# Patient Record
Sex: MALE | HISPANIC OR LATINO | Employment: OTHER | URBAN - METROPOLITAN AREA
[De-identification: names, ages, dates, MRNs, and addresses within clinical notes are randomized per-mention and may not be internally consistent; named-entity substitution may affect disease eponyms.]

---

## 2017-02-07 ENCOUNTER — GENERIC CONVERSION - ENCOUNTER (OUTPATIENT)
Dept: OTHER | Facility: OTHER | Age: 72
End: 2017-02-07

## 2018-01-09 NOTE — MISCELLANEOUS
Dear Robbie Isidro,     As discussed with Bianca Griggs, due to scheduling changes I have rescheduled some of your appointments as below:       Monday, April 11 ([address] )     · CT scan head at 2:30 pm (arrive to outpatient registration at 2:15 pm)      Friday, April 15 ([address] [facility])    · PT (physical therapy) gait assessment at 10:00 am (arrive at 9:45 am)  appointment  in our office with Dr Ivett Alcocer to follow at 11:00 am  [address] [place] [address]: [address] [facility], [facility] (near [address])  Enter the building through the main entrance and go straight down the [address] to PT  our  office is [address]      If you have any questions or concerns, please contact me directly at [phone]  Thank you           JEREMIE Montesinos  Coordinator, MIGUEL Castillo MD  Director,  ALICIA MORALES [facility]      Electronically signed by:Rose Marie Rodriguez   Apr 4 2016 11:32AM EST Author

## 2018-01-09 NOTE — RESULT NOTES
Verified Results  (1) COMPREHENSIVE METABOLIC PANEL [id number] 68:50OC Maikol Izaguirre Order Number: [id number]  TW Order Number: [id number] Order Number: [id number] Order Number: [id number]     Test Name Result Flag Reference   GLUCOSE,RANDM 183 mg/dL H    If the patient is fasting, the ADA then defines impaired fasting glucose as > 100 mg/dL and diabetes as > or equal to 123 mg/dL  SODIUM 137 mmol/L  136-145   POTASSIUM 4 9 mmol/L  3 5-5 3   CHLORIDE 104 mmol/L  100-108   CARBON DIOXIDE 28 mmol/L  21-32   ANION GAP (CALC) 5 mmol/L  4-13   BLOOD UREA NITROGEN 19 mg/dL  5-25   CREATININE 0 97 mg/dL  0 60-1 30   Standardized to IDMS reference method   CALCIUM 9 6 mg/dL  8 3-10 1   BILI, TOTAL 0 47 mg/dL  0 20-1 00   ALK PHOSPHATAS 137 U/L H    ALT (SGPT) 30 U/L  12-78   AST(SGOT) 25 U/L  5-45   ALBUMIN 3 9 g/dL  3 5-5 0   TOTAL PROTEIN 9 1 g/dL H 6 4-8 2   eGFR Non-African American      >60 0 ml/min/1 [address] Disease Education Program recommendations are as follows:  GFR calculation is accurate only with a steady state creatinine  Chronic Kidney disease less than 60 ml/min/1 73 sq  meters  Kidney failure less than 15 ml/min/1 73 sq  meters       (1) CBC/PLT/DIFF 20Jun2016 08:44AM Maikol Izaguirre Order Number: [id number]  TW Order Number: [id number]     Test Name Result Flag Reference   WBC COUNT 6 94 Thousand/uL  4 31-10 16   RBC COUNT 5 70 Million/uL H 3 88-5 62   HEMOGLOBIN 14 6 g/dL  12 0-17 0   HEMATOCRIT 44 4 %  36 5-49 3   MCV 78 fL L 82-98   MCH 25 6 pg L 26 8-34 3   MCHC 32 9 g/dL  31 4-37 4   RDW 17 5 % H 11 6-15 1   MPV 9 8 fL  8 9-12 7   PLATELET COUNT 059 Thousands/uL  149-390   nRBC AUTOMATED 0 /100 WBCs     NEUTROPHILS RELATIVE PERCENT 64 %  43-75   LYMPHOCYTES RELATIVE PERCENT 18 %  14-44   MONOCYTES RELATIVE PERCENT 12 %  4-12   EOSINOPHILS RELATIVE PERCENT 5 %  0-6   BASOPHILS RELATIVE PERCENT 1 % 0-1   NEUTROPHILS ABSOLUTE COUNT 4 45 Thousands/?L  1 85-7 62   LYMPHOCYTES ABSOLUTE COUNT 1 25 Thousands/?L  0 60-4 47   MONOCYTES ABSOLUTE COUNT 0 85 Thousand/?L  0 17-1 22   EOSINOPHILS ABSOLUTE COUNT 0 32 Thousand/?L  0 00-0 61   BASOPHILS ABSOLUTE COUNT 0 04 Thousands/?L  0 00-0 10     (1) TSH WITH FT4 REFLEX 20Jun2016 08:44AM Germain Mcintyre    Order Number: [id number]  TW Order Number: [id number] Order Number: [id number] Order Number: [id number]     Test Name Result Flag Reference   TSH 2 450 uIU/mL  0 358-3 740     (1) HEMOGLOBIN A1C 20Jun2016 08:44AM Jennifer Picking Order Number: [id number]  TW Order Number: [id number]     Test Name Result Flag Reference   HEMOGLOBIN A1C 9 1 % H 4 2-6 3   EST  AVG  GLUCOSE 214 mg/dl       (1) LIPID PANEL FASTING W DIRECT LDL REFLEX 20Jun2016 08:44AM Jennifer Picking Order Number: [id number]  TW Order Number: [id number] Order Number: [id number] Order Number: [id number]     Test Name Result Flag Reference   CHOLESTEROL 209 mg/dL H    LDL CHOLESTEROL CALCULATED 136 mg/dL H 0-100   Triglyceride:         Normal              <150 mg/dl       Borderline High    150-199 mg/dl       High               200-499 mg/dl       Very High          >499 mg/dl  Cholesterol:         Desirable        <200 mg/dl      Borderline High  200-239 mg/dl      High             >239 mg/dl  HDL Cholesterol:        High    >59 mg/dL      Low     <41 mg/dL  LDL Cholesterol:        Optimal          <100 mg/dl        Near Optimal     100-129 mg/dl        Above Optimal          Borderline High   130-159 mg/dl          High              160-189 mg/dl          Very High        >189 mg/dl  LDL CALCULATED:    This screening LDL is a calculated result  It does not have the accuracy of the Direct Measured LDL in the monitoring of patients with hyperlipidemia and/or statin therapy     Direct Measure LDL (AVL236) must be ordered separately in these patients  TRIGLYCERIDES 197 mg/dL H <=150   Specimen collection should occur prior to N-Acetylcysteine or Metamizole administration due to the potential for falsely depressed results  HDL,DIRECT 34 mg/dL L 40-60   Specimen collection should occur prior to Metamizole administration due to the potential for falsely depressed results  (1) MICROALBUMIN CREATININE RATIO, RANDOM URINE 20Jun2016 08:44AM [employer] Order Number: [id number]   Order Number: [id number]     Test Name Result Flag Reference   MICROALBUMIN/ CREAT R 236 mg/g creatinine H 0-30   MICROALBUMIN,URINE 243 0 mg/L H 0 0-20 0   CREATININE URINE 103 0 mg/dL       * XR SPINE CERVICAL 2 OR 3 VIEW 20Jun2016 07:25AM [employer] Order Number: [id number]     Test Name Result Flag Reference   XR SPINE CERVICAL 2 OR 3 VW (Report)     CERVICAL SPINE     INDICATION: Posterior neck pain and stiffness  COMPARISON: None     VIEWS: AP, lateral and open mouth projections; 4 images     FINDINGS: C6 and C7 are incompletely seen  No evidence of fracture or subluxation  Large anterior osteophytes are noted adjacent to C4-C5 and C6  There is apparent fusion anteriorly of these osteophytes at the C5-C6 level   shunt noted in place within the subcutaneous tissues of the right neck  Median sternotomy wires noted  The lung apices are intact  IMPRESSION:     Degenerative changes within the cervical spine extending from C4 through C6       Workstation performed: [id number]     Signed by:   Vero Ye MD   6/21/16       Plan  Diabetes mellitus type 2, uncontrolled    · (1) MICROALBUMIN CREATININE RATIO, RANDOM URINE ; every 1 year; Last  20Jun2016; Next 72UBE5165; Status:Active   · Hemoglobin A1c- POC ; every 3 months; Next 24JDH6115; Status:Active    Discussion/Summary   XRAY OF NECK SHOWS ARTHRITIS SO FAR  DIABETES NOT WELL CONTROLLED  CHOLESTEROL MUCH HIGHER  ARE YOU TAKING YOUR CHOLESTEROL MEDICATION?? YOU MAY NEED TO COME IN SOONER TO MAKE MEDICATION ADJUSTMENTS         Signatures   Electronically signed by : MICHEL Murillo; Jun 29 2016  7:00AM EST                       (Author)

## 2018-01-10 NOTE — RESULT NOTES
Message   send to the ordering physician        Verified Results  (1) APTT [id number] 07:37AM Shanda Bur   ********THIS TEST WAS ORDERED BY A HOSPITALIST UPON DISCHARGE ********     Test Name Result Flag Reference   PARTIAL THROMBOPLASTIN TIME 33 seconds  24-35   Therapeutic Heparin Range =  60-90 seconds     (1) PLATELET COUNT 44EDW4260 12:00AM Shanda Rio   ********THIS TEST WAS ORDERED BY A HOSPITALIST UPON DISCHARGE ********     Test Name Result Flag Reference   PLATELET COUNT 029 Thousands/uL  149-390   MPV 10 3 fL  8 9-12 7       Signatures   Electronically signed by : Flash Stoll DO; Feb 18 2016  8:00PM EST                       (Author)

## 2018-01-10 NOTE — PSYCH
Reason For Visit  Reason For Visit Free Text Note Form: Met briefly with patient and his daughter  Having issues at home as he is living with his ex-wife and her MH issues affecting his status and creating strain for family  Looking for direction for ex-wife as well as possible housing options for him  They could not stay for an appt but will meet with me on 3/28/16 to discuss options  Hav printed out [employer] and [employer] applications for the appt  Active Problems    1  Abdominal adhesions (568 0) (K66 0)   2  Abnormal laboratory test result (796 4) (R89 9)   3  Acquired insufficiency of aortic valve (424 1) (I35 1)   4  ROGELIO positive (795 79) (R76 8)   5  Anemia (285 9) (D64 9)   6  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,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)   7  Aortic stenosis (747 22) (Q25 3)   8  AVM (arteriovenous malformation) (747 60) (Q27 30)   9  Benign essential hypertension (401 1) (I10)   10  Bone pain (733 90) (M89 8X9)   11  Cellulitis (682 9) (L03 90)   12  Cognitive changes (799 59) (R41 89)   13  Coronary artery disease (414 00) (I25 10)   14  Depression (311) (F32 9)   15  Diabetes mellitus type 2, uncontrolled (250 02) (E11 65)   16  Diabetic nephropathy (250 40,583 81) (E11 21)   17  Dizziness (780 4) (R42)   18  Dry skin (701 1) (L85 3)   19  Dysphagia (787 20) (R13 10)   20  Dyspnea on exertion (786 09) (R06 09)   21  Fall at home (X187 9,P389 4) (R36  XXXA,Y92 099)   22  Fatigue (780 79) (R53 83)   23  Gait disturbance (781 2) (R26 9)   24  GERD without esophagitis (530 81) (K21 9)   25  Hand arthritis (716 94) (M12 9)   26  Hyperlipidemia (272 4) (E78 5)   27  Imbalance (781 2) (R26 89)   28  Joint pain, knee (719 46) (M25 569)   29  Left knee pain (719 46) (M25 562)   30  Memory loss (780 93) (R41 3)   31  Muscular deconditioning (781 99) (R29 898)   32  Need for immunization against influenza (V04 81) (Z23)   33  Need for pneumococcal vaccination (V03 82) (Z23)   34   Need for Tdap vaccination (V06 1) (Z23)   35  NPH (normal pressure hydrocephalus) (331 5) (G91 2)   36  PAF (paroxysmal atrial fibrillation) (427 31) (I48 0)   37  Pain in joint of left shoulder (719 41) (M25 512)   38  Pain, joint, shoulder (719 41) (M25 519)   39  Peptic ulcer (533 90) (K27 9)   40  Popliteal pain (729 5) (M79 609)   41  Preop examination (V72 84) (Z01 818)   42  Prosthetic valve dysfunction (429 4) (T82 09XA)   43  S/P AVR (V43 3) (Z95 2)   44  Screening for depression (V79 0) (Z13 89)   45  Screening for genitourinary condition (V81 6) (Z13 89)   46  Screening for neurological condition (V80 09) (Z13 89)   47  Solitary pulmonary nodule (793 11) (R91 1)   48  Status post aortic valve replacement (V43 3) (Z95 2)   49  Symptoms involving cardiovascular system (785 9) (R09 89)    Current Meds   1  Aspirin  MG Oral Tablet Delayed Release; Take 1 tablet daily; Therapy: 35FRZ9218 to (Evaluate:12Feb2016)  Requested for: 90IET1451; Last   Rx:15Oct2015 Ordered   2  Atorvastatin Calcium 20 MG Oral Tablet; TAKE 1 TABLET DAILY AT BEDTIME; Therapy: 16QPY5300 to (Evaluate:12Apr2016)  Requested for: 26ARI2829; Last   Rx:15Oct2015 Ordered   3  BD Pen Needle Penelope U/F 32G X 4 MM Miscellaneous; use 4 needles daily for insulin   injections MDD:4 TDD:4;   Therapy: 65FCD2681 to (Evaluate:08Jun2016)  Requested for: 17EMQ6442; Last   Rx:54Rjj0461 Ordered   4  Lantus SoloStar 100 UNIT/ML Subcutaneous Solution Pen-injector; INJECT 14 UNIT   Bedtime; Therapy: 32DKO3625 to (Evaluate:08Jun2016)  Requested for: 96PEH1837; Last   Rx:85Cyr8211 Ordered   5  NovoLOG FlexPen 100 UNIT/ML Subcutaneous Solution Pen-injector; INJECT   SUBCUTANEOUSLY:   12 U AT BREAKFAST - 8 U AT LUNCH- 8U AT DINNER; Therapy: 46FCP4627 to (Evaluate:08Jun2016)  Requested for: 62YES8098; Last   Rx:15Kdf9895 Ordered   6  Omeprazole 20 MG Oral Capsule Delayed Release; take 1 capsule by mouth every   morning BEFORE BREAKFAST;    Therapy: 81OTM8350 to (Evaluate:27Jun2016)  Requested for: 41GUX1033; Last   Rx:88Szz1509 Ordered   7  OneTouch Combo Pack Miscellaneous; TEST 4 TIME DAILY; Therapy: 07IFO1220 to (Last Rx:35Tcv8611)  Requested for: 15VZT2849 Ordered   8  OneTouch Delica Lancets 74D Miscellaneous; TEST FOUR TIMES DAILY AS DIRECTED; Therapy: 93MCP5092 to (Remington Shaun)  Requested for: 33RXS4251; Last   Rx:14Mar2016 Ordered   9  OneTouch Ultra Blue In Vitro Strip; USE TO TEST BLOOD SUGAR 2 TIMES A DAY; Therapy: 25DHL2416 to (Remington Shaun)  Requested for: 68KRB3284; Last   Rx:14Mar2016 Ordered    Allergies    1  No Known Drug Allergies    2  No Known Environmental Allergies   3  No Known Food Allergies    Assessment    1  Cellulitis (682 9) (L03 90)   2  Depression (311) (F32 9)   3  Diabetes mellitus type 2, uncontrolled (250 02) (E11 65)    Plan    1  Cephalexin 250 MG Oral Capsule (Keflex); TAKE 1 CAPSULE 4 TIMES DAILY    2  FLUoxetine HCl - 10 MG Oral Capsule (PROzac); TAKE 1 CAPSULE DAILY    3  (1) COMPREHENSIVE METABOLIC PANEL; Status:Active; Requested for:24Mar2016;    4  (1) HEMOGLOBIN A1C; Status:Active; Requested for:24Mar2016;    5  (1) LIPID PANEL, FASTING; Status:Active; Requested for:24Mar2016;    6  (1) MICROALBUMIN CREATININE RATIO, RANDOM URINE; Status:Active; Requested   for:24Mar2016;    7  Blood Glucose- POC; Status:Unauthorized - Requires Signature;  Requested   for:24Mar2016;   Fasting (Y/N) : Yes - Y    Signatures   Electronically signed by : Amber Steen LCSW; Mar 24 2016  2:43PM EST                       (Author)

## 2018-01-10 NOTE — MISCELLANEOUS
Dear Lulu Guardado,     As discussed with Braden Velasquez, you are scheduled for the following appointments:       Friday, December 2nd ([address] )    · CT scan head at 10:00am (arrive to outpatient registration at 9:30am)      Wednesday, December 7th ([address] [place] [address])     · PT (physical therapy) gait assessment at 11:00 (arrive at 10:45 am)  appointment in our office with Dr Olivia Yadav to follow       Both PT and our office are located on the 2nd floor of the Medical Office Building at [address] [place] [address]: [facility]  Located at the intersection of [facility] and [facility]  If you use a cane or walker, be sure to bring  these assistive devices with you  If you have any questions or need to reschedule, please contact me directly at [phone]  Thank you           Jesus Carlson RN  Coordinator, NPH Pancho Berry MD  Director,  ALICIA MORALES JR  [facility]        Electronically signed Rohit Almonte RN  Nov 14 2016  2:40PM EST Author

## 2018-01-11 NOTE — MISCELLANEOUS
Assessment    1  Cellulitis (682 9) (L03 90)   2  Depression (311) (F32 9)   3  Diabetes mellitus type 2, uncontrolled (250 02) (E11 65)    Plan  Cellulitis    · Cephalexin 250 MG Oral Capsule (Keflex); TAKE 1 CAPSULE 4 TIMES DAILY   Rx By: Tanmay Tidwell; Dispense: 7 Days ; #:28 Capsule; Refill: 0; For: Cellulitis; BASSEM = N; Sent To: [facility] [address]  Depression    · FLUoxetine HCl - 10 MG Oral Capsule (PROzac); TAKE 1 CAPSULE DAILY   Rx By: Tanmay Tidwell; Dispense: 30 Days ; #:30 Capsule; Refill: 3; For: Depression; BASSEM = N; Sent To: [facility]  Diabetes mellitus type 2, uncontrolled    · (1) COMPREHENSIVE METABOLIC PANEL; Status:Active; Requested for:24Mar2016;    Perform:[facility]; AYA:[id number];MDFXWGO; For:Diabetes mellitus type 2, uncontrolled; Ordered By:Armando Lozada;   · (1) HEMOGLOBIN A1C; Status:Active; Requested for:24Mar2016;    Perform:[facility]; AOO:[id number];INZSQTY; For:Diabetes mellitus type 2, uncontrolled; Ordered By:Armando Lozada;   · (1) LIPID PANEL, FASTING; Status:Active; Requested for:24Mar2016;    Perform:[facility]; NAN:[id number];EHVDCKI; For:Diabetes mellitus type 2, uncontrolled; Ordered By:Armando Lozada;   · (1) MICROALBUMIN CREATININE RATIO, RANDOM URINE; Status:Active; Requested  for:24Mar2016;    Perform:[facility]; HKU:36NME6208;NVJQACL; For:Diabetes mellitus type 2, uncontrolled; Ordered By:Pardeep Lozada;           assessment and plan #1 transition care management visit the patient was recently hospitalized at [facility] for a shunt placement regarding normal pressure hydrocephalus via neurosurgery Dr Ontiveros, the patient underwent the procedure without any complications and clinically is doing very well, several days ago he developed redness around the staple site in the abdominal area is minimally tender, the staples are intact    He does have follow-up with neurosurgery for staple removal in 4 days, the redness is consistent with a superficial cellulitis I will treat the patient with Keflex 250 mg 1 tablet every 6 hours Ã7 days and to monitor the area appears increasing redness, streaks fevers or chills noted by me immediately  #2 mild depression situational no suicidal ideation will start him on Prozac 10 mg once a day I will have him see counselor Kenya Stinson  #3 diabetes type 2 I'll be checking a comprehensive metabolic panel, lipid profile, hemoglobin A1c the patient did have his blood sugar checked in the office today  RTO 1-2 months call if any problems     Chief Complaint  Chief Complaint Free Text Note Form: RAMOS   Chief Complaint Chronic Condition [facility]: Patient is here today for follow up of chronic conditions described in HPI  History of Present Illness  TCM Communication [facility]: The patient is being contacted for follow-up after hospitalization  Hospital records were reviewed  He was hospitalized at [facility]  The dates of hospitalization:, date of admission: 3/14/16, date of discharge: 116/16, 50-year-old male who is coming in for his transition care management visit he is accompanied with his daughter he was hospitalized at [address] [place] [address] in [facility] for communicating hydrocephalus during his hospitalization he did undergo a right frontal ventricular peritoneal shunt, the patient did have a speedy recovery he did not have any complications during the hospitalization and he was discharged subsequently on 3-16  he is now with his wife, she needed to be admitted to the psych unit becausee of psychosis and she checked out of the hospital , daughter is upset because of the home situation , she refuses to take her meds, no safety issues he feels is emotional no si no hi he feels down for a couple months seeing his wife the way she is , the pt and daughter are requesting treatment for depression   the patient has noticed some redness around the staples of the abdomen last several days no fever no chills  Diagnosis: COMMUNICATING HYDROCEPHALUS  He was discharged to home  Medications were not reviewed today  He scheduled a follow up appointment  Follow-up appointments with other specialists: NeuroSurg Dr Percy Gonzalez 3/29/2016  Symptoms: dizziness, fatigue and incisional pain, but no fever, no weakness, no headache, no cough, no shortness of breath, no chest pain, no upper abdominal pain, no middle abdominal pain, no lower abdominal pain, no rash:, no anorexia, no nausea, no vomiting, no loose stools, no constipation, no pain with urinating and no wound drainage  The patient is currently asymptomatic  Counseling was provided to patient's family  SPOKE WITH DAUGHTER  Communication performed and completed by [facility] [place], operated by [facility]      Active Problems    1  Abdominal adhesions (568 0) (K66 0)   2  Abnormal laboratory test result (796 4) (R89 9)   3  Acquired insufficiency of aortic valve (424 1) (I35 1)   4  ROGELIO positive (795 79) (R76 8)   5  Anemia (285 9) (D64 9)   6  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,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)   7  Aortic stenosis (747 22) (Q25 3)   8  AVM (arteriovenous malformation) (747 60) (Q27 30)   9  Benign essential hypertension (401 1) (I10)   10  Bone pain (733 90) (M89 8X9)   11  Cognitive changes (799 59) (R41 89)   12  Coronary artery disease (414 00) (I25 10)   13  Diabetes mellitus type 2, uncontrolled (250 02) (E11 65)   14  Diabetic nephropathy (250 40,583 81) (E11 21)   15  Dizziness (780 4) (R42)   16  Dry skin (701 1) (L85 3)   17  Dysphagia (787 20) (R13 10)   18  Dyspnea on exertion (786 09) (R06 09)   19  Fall at home (K406 8,P610 0) (J06  GSNU,Y28 999)   20  Fatigue (780 79) (R53 83)   21  Gait disturbance (781 2) (R26 9)   22  GERD without esophagitis (530 81) (K21 9)   23  Hand arthritis (716 94) (M12 9)   24  Hyperlipidemia (272 4) (E78 5)   25   Imbalance (781 2) (R26 89)   26  Joint pain, knee (761 02) (M25 569)   27  Left knee pain (719 46) (M25 562)   28  Memory loss (780 93) (R41 3)   29  Muscular deconditioning (781 99) (R29 898)   30  Need for immunization against influenza (V04 81) (Z23)   31  Need for pneumococcal vaccination (V03 82) (Z23)   32  Need for Tdap vaccination (V06 1) (Z23)   33  NPH (normal pressure hydrocephalus) (331 5) (G91 2)   34  PAF (paroxysmal atrial fibrillation) (427 31) (I48 0)   35  Pain in joint of left shoulder (719 41) (M25 512)   36  Pain, joint, shoulder (719 41) (M25 519)   37  Peptic ulcer (533 90) (K27 9)   38  Popliteal pain (729 5) (M79 609)   39  Preop examination (V72 84) (Z01 818)   40  Prosthetic valve dysfunction (429 4) (T82 09XA)   41  S/P AVR (V43 3) (Z95 2)   42  Screening for depression (V79 0) (Z13 89)   43  Screening for genitourinary condition (V81 6) (Z13 89)   44  Screening for neurological condition (V80 09) (Z13 89)   45  Solitary pulmonary nodule (793 11) (R91 1)   46  Status post aortic valve replacement (V43 3) (Z95 2)   47  Symptoms involving cardiovascular system (785 9) (R09 89)    Past Medical History    1  History of Blood in stool (578 1) (K92 1)   2  History of Bone pain (733 90) (M89 8X9)   3  History of anemia (V12 3) (Z86 2)   4  History of arthritis (V13 4) (Z87 39)   5  History of blood transfusion (V15 89) (Z92 89)   6  History of cardiac murmur (V12 59) (Z86 79)   7  History of constipation (V12 79) (Z87 19)   8  History of nausea (V12 79) (Z87 898)   9  History of type 2 diabetes mellitus (V12 29) (Z86 39)   10  History of type 2 diabetes mellitus (V12 29) (Z86 39)   11  History of urinary frequency (V13 09) (Z87 898)   12  History of Screening for neurological condition (V80 09) (Z13 89)   13  History of Urinary urgency (678 63) (R39 15)    Surgical History    1  Aortic Valve Replacement   2  History of Small Bowel Resection  Surgical History Reviewed: The surgical history was reviewed and updated today  Family History    1  Family history of Alzheimer's disease (V17 2) (Z82 0)   2  Family history of cerebrovascular accident (V17 1) (Z82 3)   3  Family history of diabetes mellitus (V18 0) (Z83 3)   4  Family history of glaucoma (V19 11) (Z83 511)   5  Family history of hyperlipidemia (V18 19) (Z83 49)   6  Family history of kidney disease (V18 69) (Z84 1)   7  Family history of Polio    8  Family history of diabetes mellitus (V18 0) (Z83 3)   9  Family history of glaucoma (V19 11) (Z83 511)   10  Family history of hyperlipidemia (V18 19) (Z83 49)   11  Family history of kidney disease (V18 69) (Z84 1)   12  Family history of myocardial infarction (V17 3) (Z82 49)    13  Family history of Anxiety   14  Family history of depression (V17 0) (Z81 8)   15  Family history of rheumatoid arthritis (V17 7) (Z82 61)    16  Family history of Brain tumor   17  Family history of diabetes mellitus (V18 0) (Z83 3)   18  Family history of glaucoma (V19 11) (Z83 511)   19  Family history of hyperlipidemia (V18 19) (Z83 49)   20  Family history of kidney disease (V18 69) (Z84 1)   21  Family history of malignant neoplasm (V16 9) (Z80 9)   22  Family history of pancreatitis (V18 59) (Z83 79)  Family History Reviewed: The family history was reviewed and updated today  Social History    · Denied: Alcohol use (V49 89) (Z78 9)   · Caffeine use (V49 89) (F15 90)   · Completed 12th grade   ·    · Denied: Drug use (305 90) (F19 90)   · Former smoker (V15 82) (X74 071)   · Functioning activity level   · Living situation   · Retired   · Two children  Social History Reviewed: The social history was reviewed and updated today  The social history was reviewed and is unchanged  Current Meds   1  Aspirin  MG Oral Tablet Delayed Release; Take 1 tablet daily; Therapy: 18HEA3233 to (Evaluate:22Lnk3462)  Requested for: 61IRA9898; Last   Rx:15Oct2015 Ordered   2  Atorvastatin Calcium 20 MG Oral Tablet; TAKE 1 TABLET DAILY AT BEDTIME;    Therapy: 68MMG9871 to (Evaluate:12Apr2016)  Requested for: 60SFU4657; Last   Rx:40Ejs4832 Ordered   3  BD Pen Needle Penelope U/F 32G X 4 MM Miscellaneous; use 4 needles daily for insulin   injections MDD:4 TDD:4;   Therapy: 25PAK2236 to (Evaluate:08Jun2016)  Requested for: 54GNJ0655; Last   Rx:92Jgu6337 Ordered   4  Lantus SoloStar 100 UNIT/ML Subcutaneous Solution Pen-injector; INJECT 14 UNIT   Bedtime; Therapy: 63YZI4415 to (Evaluate:08Jun2016)  Requested for: 71DSL4008; Last   Rx:16Vcf9741 Ordered   5  NovoLOG FlexPen 100 UNIT/ML Subcutaneous Solution Pen-injector; INJECT   SUBCUTANEOUSLY:   12 U AT BREAKFAST - 8 U AT LUNCH- 8U AT DINNER; Therapy: 28ALG3352 to (Evaluate:08Jun2016)  Requested for: 51OFQ6607; Last   Rx:78Hfv8497 Ordered   6  Omeprazole 20 MG Oral Capsule Delayed Release; take 1 capsule by mouth every   morning BEFORE BREAKFAST; Therapy: 04NWL9518 to (Evaluate:27Jun2016)  Requested for: 79IKY2494; Last   Rx:51Kdq5789 Ordered   7  OneTouch Combo Pack Miscellaneous; TEST 4 TIME DAILY; Therapy: 45SOX8441 to (Last Rx:09Feb2016)  Requested for: 73RIN5994 Ordered   8  OneTouch Delica Lancets 16E Miscellaneous; TEST FOUR TIMES DAILY AS DIRECTED; Therapy: 45VMW9917 to (Armando Ferrara)  Requested for: 29HJO9709; Last   Rx:14Mar2016 Ordered   9  OneTouch Ultra Blue In Vitro Strip; USE TO TEST BLOOD SUGAR 2 TIMES A DAY; Therapy: 70HPY2544 to (Armando Ferrara)  Requested for: 18FPS5266; Last   Rx:14Mar2016 Ordered  Medication List Reviewed: The medication list was reviewed and updated today  Allergies    1  No Known Drug Allergies    2  No Known Environmental Allergies   3  No Known Food Allergies    Physical Exam   minimal erythema surrounding the staples approximately 5 mm surrounding no streaks, the incision is well approximated, the staples are intact there is no discharge no fluctuance   Constitutional   General appearance: No acute distress, well appearing and well nourished      Eyes Conjunctiva and lids: No swelling, erythema, or discharge  Pupils and irises: Equal, round and reactive to light  Ears, Nose, Mouth, and Throat   External inspection of ears and nose: Normal     Otoscopic examination: Tympanic membrance translucent with normal light reflex  Canals patent without erythema  Nasal mucosa, septum, and turbinates: Normal without edema or erythema  Oropharynx: Normal with no erythema, edema, exudate or lesions  Pulmonary   Respiratory effort: No increased work of breathing or signs of respiratory distress  Auscultation of lungs: Clear to auscultation, equal breath sounds bilaterally, no wheezes, no rales, no rhonci  Cardiovascular   Auscultation of heart: Abnormal   A grade 2 systolic murmur was heard at the LLSB  Examination of extremities for edema and/or varicosities: Normal     Abdomen   Abdomen: Non-tender, no masses  Liver and spleen: No hepatomegaly or splenomegaly  Lymphatic   Palpation of lymph nodes in neck: No lymphadenopathy  Psychiatric   Mood and affect: Normal          Results/Data  Encounter Results   (1) CULTURE, BODY FLUID [id number] 05:42PM Baltazar Ellsworth     Test Name Result Flag Reference   CLINICAL REPORT (Report)     Test:        Body fluid culture  Specimen Type: Body Fluid  Specimen Date:   3/14/2016 5:42 PM  Result Date:    3/17/2016 7:51 AM  Result Status:   Final result  Resulting Lab:   [place] [address]            Tel: [phone]                 CULTURE                                       ------------------                                   No growth      STAIN                                        ------------------                                   see STAT Gram Stain       Health Management  History of type 2 diabetes mellitus   *VB - Eye Exam; every 1 year; Next Due: [id number];  Overdue  Health Maintenance   Medicare Annual Wellness Visit; every 1 year; Next Due: 28Gfk9841; Overdue    Future Appointments    Date/Time Provider Specialty Site   03/29/2016 01:30 PM Neurosurgery, Nurse Schedule  [facility] NEUROSURGICAL   04/13/2016 01:00 PM JONI Huston   Neurosurgery [facility]     Signatures   Electronically signed by : Jose Francisco Shaw, ; Mar 17 2016  9:39AM EST                       (Author)    Electronically signed by : Ardia Canavan, DO; Mar 27 2016  9:39AM EST                       (Author)

## 2018-01-12 NOTE — MISCELLANEOUS
Dear Brittanie Tabor,     To continue your care through the [address], the following appointments have been scheduled:       Tuesday, March 29 [facility] office, [address] [place] [address], [address])     · 2 week post-op visit and incision check with the nurse at 1:30 pm (arrive at 1:15 pm)      Monday, April 11  ([address] )    · CT scan head at 2:30 pm (arrive to outpatient registration at 2:15 pm)   order enclosed      Wednesday, April 13 ([address] [place] [address])    ·  PT (physical therapy) gait assessment at 12:00 noon (arrive at 11:30 am)  appointment in our office with Dr Dileep Bolton to follow at 1:00 pm  2nd floor of the Medical Office Building      The above schedule is based  on your surgical date of 3/14; should this date be changed then I will need to reschedule these appointments  If you need to cancel or reschedule or have any questions, please contact me directly at [phone]  Thank you           JEREMIE Villanueva  Coordinator, NPH Erick Chiu MD  Director, [facility]      Electronically signed by:Rose Marie Dubose   Feb 24 2016  1:23PM EST Author

## 2018-01-12 NOTE — MISCELLANEOUS
Message  Message Free Text Note Form: Patient no-showed for his appt with me today at 10:30AM       Active Problems    1  Abdominal adhesions (568 0) (K66 0)   2  Abnormal laboratory test result (796 4) (R89 9)   3  Acquired insufficiency of aortic valve (424 1) (I35 1)   4  ROGELIO positive (795 79) (R76 8)   5  Anemia (285 9) (D64 9)   6  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,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)   7  Aortic stenosis (424 1) (I35 0)   8  AVM (arteriovenous malformation) (747 60) (Q27 30)   9  Benign essential hypertension (401 1) (I10)   10  Bone pain (733 90) (M89 8X9)   11  Cellulitis (682 9) (L03 90)   12  Cognitive changes (799 59) (R41 89)   13  Coronary artery disease (414 00) (I25 10)   14  Depression (311) (F32 9)   15  Diabetes mellitus type 2, uncontrolled (250 02) (E11 65)   16  Diabetic nephropathy (250 40,583 81) (E11 21)   17  Dizziness (780 4) (R42)   18  Dry skin (701 1) (L85 3)   19  Dysphagia (787 20) (R13 10)   20  Dyspnea on exertion (786 09) (R06 09)   21  Fall at home (I182 2,K160 1) (B40  XXXA,Y92 099)   22  Fatigue (780 79) (R53 83)   23  Follow-up examination following surgery (V67 00) (Z09)   24  Gait disturbance (781 2) (R26 9)   25  GERD without esophagitis (530 81) (K21 9)   26  Hand arthritis (716 94) (M12 9)   27  Hyperlipidemia (272 4) (E78 5)   28  Imbalance (781 2) (R26 89)   29  Joint pain, knee (719 46) (M25 569)   30  Left knee pain (719 46) (M25 562)   31  Medial joint line tenderness of left knee (719 46) (M25 562)   32  Memory loss (780 93) (R41 3)   33  Muscular deconditioning (781 99) (R29 898)   34  Neck pain (723 1) (M54 2)   35  Need for immunization against influenza (V04 81) (Z23)   36  Need for pneumococcal vaccination (V03 82) (Z23)   37  Need for Tdap vaccination (V06 1) (Z23)   38  NPH (normal pressure hydrocephalus) (331 5) (G91 2)   39  PAF (paroxysmal atrial fibrillation) (427 31) (I48 0)   40  Pain in joint of left shoulder (719 41) (M25 512)   41   Pain, joint, shoulder (719 41) (M25 519)   42  Peptic ulcer (533 90) (K27 9)   43  Popliteal pain (729 5) (M79 609)   44  Preop examination (V72 84) (Z01 818)   45  Prosthetic valve dysfunction (429 4) (T82 09XA)   46  S/P AVR (V43 3) (Z95 2)   47  Screening for depression (V79 0) (Z13 89)   48  Screening for genitourinary condition (V81 6) (Z13 89)   49  Screening for neurological condition (V80 09) (Z13 89)   50  Solitary pulmonary nodule (793 11) (R91 1)   51  Status post aortic valve replacement (V43 3) (Z95 2)   52  Symptoms involving cardiovascular system (785 9) (R09 89)   53   (ventriculoperitoneal) shunt status (V45 2) (Z98 2)    Current Meds   1  Aspirin  MG Oral Tablet Delayed Release; Take 1 tablet daily; Therapy: 56YDF0667 to (Evaluate:12Feb2016)  Requested for: 81KLZ8219; Last   Rx:68Qjd6497 Ordered   2  Atorvastatin Calcium 20 MG Oral Tablet; take 1 tablet by mouth every day; Therapy: 06Fek9750 to (Evaluate:25Mar2017)  Requested for: 53UQE3961; Last   Rx:52Pme8860 Ordered   3  Atorvastatin Calcium 20 MG Oral Tablet; TAKE 1 TABLET DAILY AT BEDTIME; Therapy: 89KMT4242 to (Evaluate:12Apr2016)  Requested for: 28VXN4807; Last   Rx:25Cfl8050 Ordered   4  BD Pen Needle Penelope U/F 32G X 4 MM Miscellaneous; use 4 needles daily for insulin   injections MDD:4 TDD:4;   Therapy: 68XCT8723 to (Evaluate:08Jun2016)  Requested for: 03NVU4852; Last   Rx:53Ugd0144 Ordered   5  Lantus SoloStar 100 UNIT/ML Subcutaneous Solution Pen-injector; INJECT 14 UNITS   SUBCUTANEOUS EVERY NIGHT AT BEDTIME; Therapy: 76FCD5470 to (Evaluate:21Hby9406)  Requested for: 95NYN2138; Last   Rx:12Nov2016 Ordered   6  NovoLOG FlexPen 100 UNIT/ML Subcutaneous Solution Pen-injector; INJECT 12 UNITS   SUBCUTANEOUS EVERY MORNING BEFORE BREAKFAST, 8 UNITS AT LUNCH,& 8   UNITS AT [place]; Therapy: 02RCP3396 to (Evaluate:00Mmh6317)  Requested for: 35PHQ9323; Last   Rx:12Nov2016 Ordered   7   OneTouch Combo Pack Miscellaneous; TEST 4 TIME DAILY; Therapy: 74ICY0741 to (Last Rx:76Bkj5453)  Requested for: 14DJF6296 Ordered   8  OneTouch Delica Lancets 73T Miscellaneous; TEST FOUR TIMES DAILY AS DIRECTED; Therapy: 12MEK2102 to (Evaluate:22Oct2016)  Requested for: 66YJM3412; Last   Rx:32Gfd9438 Ordered   9  OneTouch Ultra Blue In Vitro Strip; TO TEST BLOOD SUGAR TWO TIMES A DAY; Therapy: 70HTA8148 to (Evaluate:21Sep2017)  Requested for: 04FXJ4607; Last   Rx:26Nov2016 Ordered   10  Sertraline HCl - 25 MG Oral Tablet (Zoloft); TAKE 1 TABLET DAILY; Therapy: 56LXE7992 to (Evaluate:25Mar2017)  Requested for: 61IGI8076; Last    Rx:25Nov2016 Ordered    Allergies    1  No Known Drug Allergies    2  No Known Environmental Allergies   3   No Known Food Allergies    Signatures   Electronically signed by : Jero Castaneda LCSW; Nov 30 2016 10:46AM EST                       (Author)

## 2018-01-13 NOTE — MISCELLANEOUS
Provider Comments  Provider Comments:   Pt was a n/s on 2/6/17  LMOM to call back and make new apt        Signatures   Electronically signed by : Ed Orozco DO; Feb 7 2017  6:33PM EST                       (Author)

## 2018-01-13 NOTE — PROGRESS NOTES
Chief Complaint  Patient presents Post insertion of frontal ventriculoperitoneal shunt  Certas plus programmable valve set to 4  History of Present Illness  HPI: Patient presents for 2-week POV for incision check and staple removal  Patient states he is feeling better but weak  He did present to PCP on 3/24 with c/o nausea  PCP did provide him with Keflex due to "redness" of his incisions  Daughter states incisions look better  Ambulating much better  No confusion or c/o HA's  Not using pain medications  Active Problems    1  Abdominal adhesions (568 0) (K66 0)   2  Abnormal laboratory test result (796 4) (R89 9)   3  Acquired insufficiency of aortic valve (424 1) (I35 1)   4  ROGELIO positive (795 79) (R76 8)   5  Anemia (285 9) (D64 9)   6  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,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)   7  Aortic stenosis (747 22) (Q25 3)   8  AVM (arteriovenous malformation) (747 60) (Q27 30)   9  Benign essential hypertension (401 1) (I10)   10  Bone pain (733 90) (M89 8X9)   11  Cellulitis (682 9) (L03 90)   12  Cognitive changes (799 59) (R41 89)   13  Coronary artery disease (414 00) (I25 10)   14  Depression (311) (F32 9)   15  Diabetes mellitus type 2, uncontrolled (250 02) (E11 65)   16  Diabetic nephropathy (250 40,583 81) (E11 21)   17  Dizziness (780 4) (R42)   18  Dry skin (701 1) (L85 3)   19  Dysphagia (787 20) (R13 10)   20  Dyspnea on exertion (786 09) (R06 09)   21  Fall at home (H364 3,S015 5) (M13  XXXA,Y92 099)   22  Fatigue (780 79) (R53 83)   23  Gait disturbance (781 2) (R26 9)   24  GERD without esophagitis (530 81) (K21 9)   25  Hand arthritis (716 94) (M12 9)   26  Hyperlipidemia (272 4) (E78 5)   27  Imbalance (781 2) (R26 89)   28  Joint pain, knee (719 46) (M25 569)   29  Left knee pain (719 46) (M25 562)   30  Memory loss (780 93) (R41 3)   31  Muscular deconditioning (781 99) (R29 898)   32  Need for immunization against influenza (V04 81) (Z23)   33   Need for pneumococcal vaccination (V03 82) (Z23)   34  Need for Tdap vaccination (V06 1) (Z23)   35  NPH (normal pressure hydrocephalus) (331 5) (G91 2)   36  PAF (paroxysmal atrial fibrillation) (427 31) (I48 0)   37  Pain in joint of left shoulder (719 41) (M25 512)   38  Pain, joint, shoulder (719 41) (M25 519)   39  Peptic ulcer (533 90) (K27 9)   40  Popliteal pain (729 5) (M79 609)   41  Preop examination (V72 84) (Z01 818)   42  Prosthetic valve dysfunction (429 4) (T82 09XA)   43  S/P AVR (V43 3) (Z95 2)   44  Screening for depression (V79 0) (Z13 89)   45  Screening for genitourinary condition (V81 6) (Z13 89)   46  Screening for neurological condition (V80 09) (Z13 89)   47  Solitary pulmonary nodule (793 11) (R91 1)   48  Status post aortic valve replacement (V43 3) (Z95 2)   49  Symptoms involving cardiovascular system (785 9) (R09 89)    Current Meds   1  Aspirin  MG Oral Tablet Delayed Release; Take 1 tablet daily; Therapy: 77PWZ7580 to (Evaluate:12Feb2016)  Requested for: 24ILA8625; Last   Rx:15Oct2015 Ordered   2  Atorvastatin Calcium 20 MG Oral Tablet; TAKE 1 TABLET DAILY AT BEDTIME; Therapy: 66JFZ1865 to (Evaluate:12Apr2016)  Requested for: 30FAM4100; Last   Rx:15Oct2015 Ordered   3  BD Pen Needle Penelope U/F 32G X 4 MM Miscellaneous; use 4 needles daily for insulin   injections MDD:4 TDD:4;   Therapy: 65EAO9670 to (Evaluate:08Jun2016)  Requested for: 51NUX5045; Last   Rx:09Feb2016 Ordered   4  Cephalexin 250 MG Oral Capsule; TAKE 1 CAPSULE 4 TIMES DAILY; Therapy: 42OXD2517 to (Evaluate:31Mar2016)  Requested for: 10BCJ4318; Last   Rx:24Mar2016 Ordered   5  Lantus SoloStar 100 UNIT/ML Subcutaneous Solution Pen-injector; INJECT 14 UNIT   Bedtime; Therapy: 42QIX6644 to (Evaluate:08Jun2016)  Requested for: 14UNQ9761; Last   Rx:65Wvi8284 Ordered   6  NovoLOG FlexPen 100 UNIT/ML Subcutaneous Solution Pen-injector; INJECT   SUBCUTANEOUSLY:   12 U AT BREAKFAST - 8 U AT LUNCH- 8U AT DINNER;    Therapy: 54KTO3818 to (Evaluate:08Jun2016)  Requested for: 83ISR8825; Last   Rx:96Twx2457 Ordered   7  Omeprazole 20 MG Oral Capsule Delayed Release; take 1 capsule by mouth every   morning BEFORE BREAKFAST; Therapy: 19LZR3608 to (Evaluate:27Jun2016)  Requested for: 19MDV8946; Last   Rx:61Cwt8242 Ordered   8  OneTouch Combo Pack Miscellaneous; TEST 4 TIME DAILY; Therapy: 00HML6708 to (Last Rx:09Feb2016)  Requested for: 53UFQ3674 Ordered   9  OneTouch Delica Lancets 26C Miscellaneous; TEST FOUR TIMES DAILY AS   DIRECTED; Therapy: 76VAL8721 to (Abelardo Noland)  Requested for: 93OWI8264; Last   Rx:14Mar2016 Ordered   10  OneTouch Ultra Blue In Vitro Strip; USE TO TEST BLOOD SUGAR 2 TIMES A DAY; Therapy: 21SKE2913 to (Abelardo Noland)  Requested for: 52HBV4654; Last    Rx:14Mar2016 Ordered    Allergies    1  No Known Drug Allergies    2  No Known Environmental Allergies   3  No Known Food Allergies    Vitals  Signs [Data Includes: Current Encounter]    Temperature: 98 F  Heart Rate: 75  Respiration: 16  Systolic: 620  Diastolic: 72  Height: 5 ft 4 in  Weight: 150 lb   BMI Calculated: 25 75  BSA Calculated: 1 73    Procedure  Procedure: suture removal  The wound was located on the R retroauricular and abdominal    Wound Exam: Both incisions c/d/i  No active drainage, warmth of skin, edema, induration or gapping of incision noted  Scabbing present to entire length of abdominal incision  There was mild erythema around the wound edges  Procedure Note: staples were removed  Patient Status:  the patient tolerated the procedure well  Complications:  there were no complications        Plan  Follow-up examination following surgery    · Follow-up Visit in 4 Weeks Evaluation and Treatment  Follow-up  Status: Hold For -  Scheduling,Retrospective By Protocol Authorization  Requested for: [id number]    Discussion/Summary    Reviewed incision care with patient and daughter including daily observation for s/s of infection which include increased redness, edema, warmth of skin, tenderness, gapping of incision, drainage or fever > 101  Wash incisions with mild soap and water, pat dry  No creams, lotions or ointments to be applied to incision  Increase ambulation as safely tolerated  Pt will finish Keflex supplied by PCP  Pt to have Ct of head prior to f/u with Dr Collette Edwards, script provided along with date and time scheduled  To contact office with any questions or concerns  Future Appointments    Date/Time Provider Specialty Site   05/25/2016 10:30 AM Elder Mcgregor DO Internal [facility]   04/13/2016 01:00 PM JONI Fatima   Neurosurgery [facility]     Signatures   Electronically signed by : Rolin Osgood, ; Mar 29 2016  1:40PM EST                       (Author)    Electronically signed by : JONI Mario ; Mar 30 2016  7:25AM EST

## 2018-01-13 NOTE — MISCELLANEOUS
Provider Comments  Provider Comments:   Patient no-showed for an appt with me today        Signatures   Electronically signed by : Octavia Cohen LCSW; Mar 28 2016  1:56PM EST                       (Author)

## 2018-01-14 NOTE — RESULT NOTES
Message      is the pt on coumadin ; why was this ordered        Verified Results  (1) PT WITH INR [id number] 07:37AM Maggie Velasquez   ********THIS TEST WAS ORDERED BY A HOSPITALIST UPON DISCHARGE ********     Test Name Result Flag Reference   INR 1 16  0 86-1 16   PT 14 1 seconds  11 8-14 1       Signatures   Electronically signed by : Alvin Jara DO; Feb 17 2016  6:10PM EST                       (Author)

## 2018-01-14 NOTE — MISCELLANEOUS
Dear Kathy Richards,     As discussed, you have been scheduled for continued evaluation through the NPH (normal pressure hydrocephalus) Center with the following appointments: Wednesday, February 17 ([facility])    · LP (lumbar puncture) at 8:00 am (arrive at 6:30 am); hospital entrance to registration desk   once discharged from the [address] [place] [address], report to PT (physical therapy) on the 2nd floor of the [address] [place] [address]; scheduled for 10:00 am, but timing will be dependent on the LP testing  appointment in our office with Dr Avery Hernandez following  completion of the PT, ~11:00 am      Marcela from radiology will be contacting you to review your medications; you will need to hold your aspirin for 5 days prior (your last dose will be 2/11)  If you need to cancel  or reschedule, please contact me directly at [phone]  Thank you           JEREMIE Causey  Coordinator, NPH Carolina Ray MD  Director, ALICIA MORALES [facility]          Electronically signed by:Rose Marie Navarro   Jan 26 2016  2:01PM EST Author

## 2018-01-18 NOTE — CONSULTS
Assessment    1  NPH (normal pressure hydrocephalus) (331 5) (G91 2)    Plan  NPH (normal pressure hydrocephalus)    · Follow Up After Tests Complete Evaluation and Treatment  Follow-up  Status: Hold For -  Scheduling  Requested for: 20Jan2016   Ordered; For: NPH (normal pressure hydrocephalus); Ordered By: Charlie Watts Performed:  Due: 59IZW4344    Discussion/Summary    I reviewed the history, physical examination and imaging in detail today  The clinical and radiographic criteria support the diagnosis of Probable NPH based on 2005 INPH Guidelines  A total of 60 minutes is spent with patient which more and 50% was spent in direct counseling coronation of care  A CSF opening is required to corroborate the clinical diagnosis  The next stage of work-up would include a high volume lumbar puncture (LP) with post-procedure formal cognitive and gait assessment, followed by a meeting with a neurosurgeon to discuss the results and surgical options  The goal of the high volume LP is to:    1  Obtain an opening pressure  2   Measure post- procedure gait and cognitive performance  This information would provide greater sensitivity for possible improvement following a CSF diversion procedure and needs to be considered prior to proceeding with a permanent shunting procedure  The risks of high volume LP were discussed:    1  Remote risk of infection  2  Risk low pressure headache, though this is usually self limited  3  Remote risk of neurological injury  4  All antiplatelet/anticoagulant medication will need to be held prior to the procedure  Depending on the indication for these medications, the patient/family may wish to discuss this further with the PCP  Once all questions were answered to their satisfaction, they asked to proceed with a high volume LP   Arrangements will be made to undergo the high volume LP, post-procedure cognitive and gait assessment, followed by a return visit to discuss surgical options  Thank you for referring your patient to the [facility] Normal Pressure Hydrocephalus clinic  Chief Complaint  Gait and cognitive difficulties  Urinary urgency  History of Present Illness  16-year-old right-hand-dominant gentleman accompanied by his daughter today  In 2014 he underwent open heart surgery which was complicated by bowel obstruction requiring a laparotomy  More recently, he underwent a revision AVR in late 2015  After his initial cardiac surgery both he and his daughter noted cognitive decline, specifically with memory, difficulties with gait and urinary urgency without héctor incontinence  According to his daughter the symptoms have become progressively worse over the past year or so  The patient denies any pain, weakness or numbness in his legs but simply feels as if they are less coordinated  His daughter is noticed difficulties with short-term memory and he often repeats himself  He will have urinary incontinence when he cannot arrive at the bathroom in time  There is no history of severe brain injury, intracranial hemorrhage or meningitis  They present today with an MRI of the brain in for evaluation for possible normal pressure hydrocephalus  Review of Systems    Constitutional: no fever and no chills  Eyes: wearing eyeglasses, but no eyesight problems  ENT: no hearing loss  Cardiovascular: no chest pain  Respiratory: no shortness of breath  Gastrointestinal: no abdominal pain, no nausea, no vomiting, no constipation and no diarrhea  Genitourinary: urgency, frequency, but no urinary hesitancy and no incontinence  Musculoskeletal: limb pain and leg pain with increased walking, but no arthralgias and no limb swelling  Integumentary: dry skin, but no rashes and no itching  Neurological: difficulty walking and imbalance; forgetting; short-term memory loss, but no headache, no numbness, no tingling and no limb weakness     Psychiatric: anxiety and depression  Endocrine: DM, insulin - managed by PCP, but no hot flashes  Hematologic/Lymphatic: a tendency for easy bruising and 325 mg aspirin daily; prior blood transfusion with 2014 surgery; prior IV iron infusions; mother noted with stroke, but no swollen glands and no swollen glands in the neck  ROS reviewed  Active Problems    1  Abdominal adhesions (568 0) (K66 0)   2  Abnormal laboratory test result (796 4) (R89 9)   3  Acquired insufficiency of aortic valve (424 1) (I35 1)   4  ROGELIO positive (795 79) (R76 8)   5  Anemia (285 9) (D64 9)   6  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,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)   7  Aortic stenosis (424 1) (I35 0)   8  AVM (arteriovenous malformation) (747 60) (Q27 30)   9  Benign essential hypertension (401 1) (I10)   10  Blood in stool (578 1) (K92 1)   11  Bone pain (733 90) (M89 8X9)   12  Cognitive changes (799 59) (R41 89)   13  Constipation (564 00) (K59 00)   14  Coronary artery disease (414 00) (I25 10)   15  Diabetes mellitus type 2, uncontrolled (250 02) (E11 65)   16  Diabetes mellitus, type 2 (250 00) (E11 9)   17  Diabetic nephropathy (250 40,583 81) (E11 21)   18  Dizziness (780 4) (R42)   19  Dysphagia (787 20) (R13 10)   20  Dyspnea on exertion (786 09) (R06 09)   21  Fall at home (P710 4,O728 3) (U73  XXXA,Y92 099)   22  Fatigue (780 79) (R53 83)   23  Gait disturbance (781 2) (R26 9)   24  GERD (gastroesophageal reflux disease) (530 81) (K21 9)   25  Hand arthritis (716 94) (M12 9)   26  Imbalance (781 2) (R26 89)   27  Joint pain, knee (719 46) (M25 569)   28  Left knee pain (719 46) (M25 562)   29  Memory loss (780 93) (R41 3)   30  Muscular deconditioning (781 99) (R29 898)   31  Nausea (787 02) (R11 0)   32  Need for immunization against influenza (V04 81) (Z23)   33  Need for pneumococcal vaccination (V03 82) (Z23)   34  NPH (normal pressure hydrocephalus) (331 5) (G91 2)   35  PAF (paroxysmal atrial fibrillation) (427 31) (I48 0)   36   Pain in joint of left shoulder (719 41) (M25 512)   37  Pain, joint, shoulder (719 41) (M25 519)   38  Peptic ulcer (533 90) (K27 9)   39  Popliteal pain (729 5) (M79 609)   40  Prosthetic valve dysfunction (429 4) (I97 110,T82  01XA)   41  S/P AVR (V43 3) (Z95 2)   42  Screening for depression (V79 0) (Z13 89)   43  Screening for genitourinary condition (V81 6) (Z13 89)   44  Screening for neurological condition (V80 09) (Z13 89)   45  Solitary pulmonary nodule (793 11) (R91 1)   46  Status post aortic valve replacement (V43 3) (Z95 2)   47  Symptoms involving cardiovascular system (785 9) (R09 89)    Past Medical History    1  History of Bone pain (733 90) (M89 8X9)   2  History of anemia (V12 3) (Z86 2)   3  History of arthritis (V13 4) (Z87 39)   4  History of type 2 diabetes mellitus (V12 29) (Z86 39)   5  History of Screening for neurological condition (V80 09) (Z13 89)    The active problems and past medical history were reviewed and updated today  Surgical History    1  Aortic Valve Replacement   2  History of Small Bowel Resection    The surgical history was reviewed and updated today  Family History    1  Family history of cerebrovascular accident (V17 1) (Z82 3)   2  Family history of diabetes mellitus (V18 0) (Z83 3)   3  Family history of glaucoma (V19 11) (Z83 511)   4  Family history of hyperlipidemia (V18 19) (Z83 49)   5  Family history of kidney disease (V18 69) (Z84 1)   6  Family history of Polio    7  Family history of diabetes mellitus (V18 0) (Z83 3)   8  Family history of glaucoma (V19 11) (Z83 511)   9  Family history of hyperlipidemia (V18 19) (Z83 49)   10  Family history of kidney disease (V18 69) (Z84 1)   11  Family history of myocardial infarction (V17 3) (Z82 49)    12  Family history of Anxiety   13  Family history of depression (V17 0) (Z81 8)   14  Family history of rheumatoid arthritis (V17 7) (Z82 61)    15  Family history of Brain tumor   16   Family history of diabetes mellitus (V18 0) (Z83 3)   17  Family history of glaucoma (V19 11) (Z83 511)   18  Family history of hyperlipidemia (V18 19) (Z83 49)   19  Family history of kidney disease (V18 69) (Z84 1)   20  Family history of malignant neoplasm (V16 9) (Z80 9)   21  Family history of pancreatitis (V18 59) (Z83 79)    The family history was reviewed and updated today  Social History    · Denied: Alcohol use (V49 89) (F10 99)   · Caffeine use (V49 89) (F15 90)   · Completed 12th grade   ·    · Denied: Drug use (305 90) (F19 90)   · Former smoker (Q93 49) (J64 309)  The social history was reviewed and updated today  Current Meds   1  Aspirin  MG Oral Tablet Delayed Release; Take 1 tablet daily; Therapy: 18ANR3332 to (Evaluate:12Feb2016)  Requested for: 32LKN1630; Last   Rx:15Oct2015 Ordered   2  Atorvastatin Calcium 20 MG Oral Tablet; TAKE 1 TABLET DAILY AT BEDTIME; Therapy: 51YYM8865 to (Evaluate:12Apr2016)  Requested for: 21SWW7732; Last   Rx:15Oct2015 Ordered   3  BD Pen Needle Penelope U/F 32G X 4 MM Miscellaneous; use 4 needles daily for insulin   injections MDD:4 TDD:4;   Therapy: 56JGT9549 to (Evaluate:12Feb2016)  Requested for: 81NFJ6706; Last   Rx:15Oct2015 Ordered   4  Lantus SoloStar 100 UNIT/ML Subcutaneous Solution Pen-injector; INJECT 14 UNIT   Bedtime; Therapy: 42WAS9438 to (Evaluate:11Mar2016)  Requested for: 28ASD3078; Last   Rx:12Nov2015 Ordered   5  NovoLOG FlexPen 100 UNIT/ML Subcutaneous Solution Pen-injector; INJECT   SUBCUTANEOUSLY:   12 U AT BREAKFAST - 8 U AT LUNCH- 8U AT DINNER; Therapy: 79XUO5803 to (Evaluate:11Mar2016)  Requested for: 15PJT5744; Last   Rx:12Nov2015 Ordered   6  Omeprazole 20 MG Oral Capsule Delayed Release; take 1 capsule by mouth every   morning BEFORE BREAKFAST; Therapy: 87YMX0646 to (Evaluate:27Jun2016)  Requested for: 28GKJ3292; Last   Rx:80Dfp1756 Ordered    The medication list was reviewed and updated today  Allergies    1   No Known Drug Allergies    2  No Known Environmental Allergies   3  No Known Food Allergies    Vitals  Vital Signs [Data Includes: Current Encounter]    Recorded: 20Jan2016 11:06AM   Temperature 97 7 F, Oral   Heart Rate 72   Respiration 16   Systolic 043, RUE, Sitting   Diastolic 66, RUE, Sitting   Height 5 ft 4 in   Weight 150 lb 2 08 oz   BMI Calculated 25 77   BSA Calculated 1 74     Physical Exam     Constitutional Patient appears the stated age  No signs of acute distress present  No involuntary movement  Patient is cooperative  Eyes Discs flat with sharp margins  Bilateral optic discs: not visualized  Respiratory Auscultation of lungs: Clear to auscultation bilaterally  Cardiovascular Auscultation of heart: No extra sounds  Abdomen   Abdomen: Abnormal   Bowel sounds were normal  Skin findings: a scar  The abdomen was soft and nontender  Neurologic - Mental Status:   Orientation to person, place, and time: Abnormal   Mood and affect: Affect is normal     Recent and remote memory: Abnormal     Cranial Nerve Exam:  3rd, 4th, and 6th cranial nerves: PERRLA, EOMI without lateral gaze nystagmus  5th cranial nerve: Normal with no noted deficit  7th cranial nerve: Face symmetrical at grimace and at rest  12th cranial nerve: Tongue mideline, no atrophy present  Motor System General Motor Strength: No pronator drift and no parietal drift  5/5 power in the lower extremities  Motor System - Lower Extremities: Muscle tone: Normal bilaterally  Muscle Bulk: Normal bilaterally  Reflexes: Biceps reflexes are intact bilaterally  Brachioradialis reflexes are intact bilaterally  Achilles reflexes are 2+ bilaterally  Babinski's reflex is down going bilaterally  Jacobo's sign is absent bilaterally  Deep tendon reflexes: 0 right patella, 0 left patella, 0 right ankle jerk and 0 left ankle jerkno ankle clonus on the right and no ankle clonus on the left   Superficial/Primitive Reflexes: Babinski reflex absent on the right and Babinski reflex absent on the left  Coordination:   Coordination: Abnormal   Coordination: impaired balance, but negative Romberg's sign and no dysdiadochokinesia  Sensory: Sensation grossly intact to light touch  Gait and Station:   Gait and station: Abnormal   (Toes pointed outwards  On block turn in 3 steps) Gait evaluation demonstrated shuffling  Results/Data    I personally reviewed the PT gait assessment, cognitive assessment and MRI in detail with the patient  MRI Review MRI of the brain dated August 25, 2015  No contrast administered  Age-appropriate degree of cortical atrophy  Ventricular system is dilated slightly out of proportion to degree of cortical atrophy  No transependymal flow  White matter signal change consistent with small vessel ischemic disease  Cavum septum vergae  Results Review PT gait assessment dated  TUG 13 1 sec, TUGc 14 9 sec, DGI 18/24  MoCA dated June 23, 2015,11/30        Future Appointments    Date/Time Provider Specialty Site   02/09/2016 10:30 AM Cathy Louise DO Internal Medicine [facility]     Signatures   Electronically signed by : JONI Hays ; Jan 20 2016  3:01PM EST

## 2018-03-07 NOTE — PROCEDURES
Discussion/Summary  Discussion Summary:   THIS IS A 70 Y  O  MALE THAT WAS SEEN IN [facility] FOR GERIATRIC SCREENING RT AGE AND HAVING SURGERY  THE PATIENT WALKED INTO [facility] AND DID WELL  STEADY GAIT  PASSED TUGT  STATES THAT HE HAD A SPINAL TAP ABOUT TWO WEEKS AGO  ADMITS THAT HE WAS DOING REALLY GOOD AFTER THAT SPINAL TAP; BUT, HAS NOTICED HIS MEMORY AND BALANCE GETTING "LIKE BEFORE"-PER PATIENT  DENIES ANY FALLS THIS YEAR  STATES THAT HIS LAST FALL WAS TWO YEARS AGO AFTER HIS FIRST HEART SURGERY  LIVES AT HOME WITH WIFE  WILL HAVE HELP FROM DAUGHTER DONATO  HAS 2 STEPS TO GET INTO HOME  ONCE IN THE HOME WILL NEED TO MANGE 13 STEPS TO THE UPSTAIRS BATHROOM  PATIENT STATES THAT HE HAS NO PROBLEMS WITH THE STEPS NOW  FAMILY PRESENT AND AGREES WITH FINDINGS  MINI-COG- PATIENT SHOWED NO CONCERNS ON THE EVAL  HOWEVER, ADMITS TO HAVING MEMORY PROBLEMS  FAMILY AGREES  STATES THAT HE WAS REALLY CONFUSED AFTER HIS FIRST HEART SURGERY  I WOULD MONITOR PATIENT FOR POST-OP DELIRIUM   I GAVE MY NUMBER TO DAUGHTER WHO IS IDENTIFIED AS THE MAIN GIVER  HER NAME IS DONATO  SHE WILL BE ABLE TO HELP AFTER SURGERY  DEPRESSION- THIS PATIENT SCORED A LEVEL 5 WHICH SUGGEST POSSIBLE DEPRESSION THAT COULD AFFECT HIS HEALING PROCESS AFTER SURGERY  I WOULD MONITOR FOR POST-OP FFT  THE PATIENT ADMITS TO BEING DEPRESSED AT TIMES  HOWEVER HE HAS GOALS FOR AFTER SURGERY  PATIENT IS MOTIVATED  THE PATIENT PLANS ON ATTENDING ACTIVITY EVENTS AT THE [facility]  ALSO WANT TO GET BACK TO DAILY [facility]  DENIES WT  LOSS  PASSED TUGT  DENIES FALLS  WANTS TO RETURN HOME AFTER SURGERY  GIVEN THE CIRCUMSTANCES I FEEL THAT THIS IS POSSIBLE FOR PATIENT TO RETURN HOME  HE HAS A GOOD SUPPORT SYSTEM  NOT FRAIL  CAREGIVER-DONATO (DAUGHTER)    PATIENT AND FAMILY WERE VERY PLEASANT TO CARE FOR  I WOULD MONITOR CLOSELY FOR POST-OP DELIRIUM, FTT  I WOULD IMPLEMENT FALL PRECAUTIONS  I WOULD AGREE THAT PATIENT CAN BE DC'D TO HOME AFTER SURGERY IF DOES WELL  THANKS       Procedure    Surgeon: DR Gisela Reynolds   Procedure: NPH   Cognitive Assessment   Cognitive Assessment: Mini- Cog Total Score: 5  the recommendation is for a Geriatric Inpatient Consult  NO ACTIVE CONCERNS , FAMILY STATES THAT HIS MEMORY HAS DECLINED  PATIENT STATES THAT HE DOES NOTICE IT  I WOULD RECOMMEND A INPATIENT GERIATRIC EVAL WITH DR LARA IF PATIENT SHOW SIGNS OF POST-OP DEPRESSION   Depression Screening   Depression Screening: Total Score: 5  PATIENT AND FAMILY ADMITS TO BEING DEPRESSED  Cardiac Risk Factors Include:   1) AVR TIMES 2       Pulmonary Risk Factors Include:   1)    Patient was provided and educated on an incentive spirometer  Functional/Performance Assessment   The patient answered 0 of the 8 questions with "No"  A caregiver assists the patient   Able to stand up from a chair by themselves and on the first try  Able to get dressed by self  Able to bathe by self  Able to make own meals  Able to get to bathroom by self  There is not a bathroom in their home on the same floor that they will spend most of their time in after surgery  Patient is able to obtain assistance after surgery in their home from a relative or other caregiver that does not reside with them  Patient receives help from: 13 STEPS TO THE BATHROOM  and the caregiver's name [facility]: [facility]  Home health services have been utilized in the last year and the agency name was: [facility]   ~He/he owns and uses adaptive equipment and has experienced 0 fall(s) in the last year  PATIENT STATES THAT HE USED TO FALL, AFTER HIS FIRST HEART SURGERY BC HE WAS ANEMIC AND NEEDED A BLOOD TRANSFUSION   DENIES ANY RECENT FALLS AND FAMILY CONFRIMS     Frailty Assessment   Weight Loss = 0  Decreased  Strength (weakness) = 0  Exhaustion, 1 = Some or a litle of the time (1-2 days)  Low Physical Activity = 0  Slowed Walking Speed  Interpretation of the Frailty Score    Gait & Mobility Assessment The patient did not require more than 15 seconds to complete TGUT  Nutritional Assessment r /rs  had not experienced unexplained weightloss in the past year  HAD WEIGHT LOSS AFTER THE FIRST HEART SURGERY 2 YEARS AGO, HAS GAINED SOME WEIGHT BACK AND MAINTAINED THE WEIGHT  Caregiver burden score: (0) No burden at all   Summary:        Active Problems    1  Abdominal adhesions (568 0) (K66 0)   2  Abnormal laboratory test result (796 4) (R89 9)   3  Acquired insufficiency of aortic valve (424 1) (I35 1)   4  ROGELIO positive (795 79) (R76 8)   5  Anemia (285 9) (D64 9)   6  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,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)   7  Aortic stenosis (747 22) (Q25 3)   8  AVM (arteriovenous malformation) (747 60) (Q27 30)   9  Benign essential hypertension (401 1) (I10)   10  Blood in stool (578 1) (K92 1)   11  Bone pain (733 90) (M89 8X9)   12  Cognitive changes (799 59) (R41 89)   13  Constipation (564 00) (K59 00)   14  Coronary artery disease (414 00) (I25 10)   15  Diabetes mellitus type 2, uncontrolled (250 02) (E11 65)   16  Diabetes mellitus, type 2 (250 00) (E11 9)   17  Diabetic nephropathy (250 40,583 81) (E11 21)   18  Dizziness (780 4) (R42)   19  Dry skin (701 1) (L85 3)   20  Dysphagia (787 20) (R13 10)   21  Dyspnea on exertion (786 09) (R06 09)   22  Fall at home (D734 9,C535 5) (B32  CTBY,J38 454)   23  Fatigue (780 79) (R53 83)   24  Gait disturbance (781 2) (R26 9)   25  GERD without esophagitis (530 81) (K21 9)   26  Hand arthritis (716 94) (M12 9)   27  Hyperlipidemia (272 4) (E78 5)   28  Imbalance (781 2) (R26 89)   29  Joint pain, knee (719 46) (M25 569)   30  Left knee pain (719 46) (M25 562)   31  Memory loss (780 93) (R41 3)   32  Muscular deconditioning (781 99) (R29 898)   33  Nausea (787 02) (R11 0)   34  Need for immunization against influenza (V04 81) (Z23)   35  Need for pneumococcal vaccination (V03 82) (Z23)   36  NPH (normal pressure hydrocephalus) (331 5) (G91 2)   37   PAF (paroxysmal atrial fibrillation) (427 31) (I48 0)   38  Pain in joint of left shoulder (719 41) (M25 512)   39  Pain, joint, shoulder (719 41) (M25 519)   40  Peptic ulcer (533 90) (K27 9)   41  Popliteal pain (729 5) (M79 609)   42  Prosthetic valve dysfunction (429 4) (T82 09XA)   43  S/P AVR (V43 3) (Z95 2)   44  Screening for depression (V79 0) (Z13 89)   45  Screening for genitourinary condition (V81 6) (Z13 89)   46  Screening for neurological condition (V80 09) (Z13 89)   47  Solitary pulmonary nodule (793 11) (R91 1)   48  Status post aortic valve replacement (V43 3) (Z95 2)   49  Symptoms involving cardiovascular system (785 9) (R09 89)   50  Urinary frequency (788 41) (R35 0)   51  Urinary urgency (788 63) (R39 15)    Current Meds    1  Aspirin  MG Oral Tablet Delayed Release; Take 1 tablet daily; Therapy: 40RPN9598 to (Evaluate:12Feb2016)  Requested for: 36ZXX9459; Last   Rx:15Oct2015 Ordered   2  BD Pen Needle Peneolpe U/F 32G X 4 MM Miscellaneous; use 4 needles daily for insulin   injections MDD:4 TDD:4;   Therapy: 60CYD9515 to (Evaluate:08Jun2016)  Requested for: 33YQR9675; Last   Rx:[id number] Ordered   3  Lantus SoloStar 100 UNIT/ML Subcutaneous Solution Pen-injector; INJECT 14 UNIT   Bedtime; Therapy: 19FBB6709 to (Evaluate:08Jun2016)  Requested for: 15AUE5002; Last   Rx:[id number] Ordered   4  NovoLOG FlexPen 100 UNIT/ML Subcutaneous Solution Pen-injector; INJECT   SUBCUTANEOUSLY:   12 U AT BREAKFAST - 8 U AT LUNCH- 8U AT DINNER; Therapy: 00QJY7589 to (Evaluate:08Jun2016)  Requested for: 93FIU9426; Last   Rx:[id number] Ordered    5  Omeprazole 20 MG Oral Capsule Delayed Release; take 1 capsule by mouth every   morning BEFORE BREAKFAST; Therapy: 38CQJ6905 to (Evaluate:27Jun2016)  Requested for: 97VEK9746; Last   Rx:[id number] Ordered    6  OneTouch Combo Pack Miscellaneous; TEST 4 TIME DAILY; Therapy: 62EDZ8756 to (Last Rx:72Jro2923)  Requested for: 30JCY3013 Ordered    7   Atorvastatin Calcium 20 MG Oral Tablet; TAKE 1 TABLET DAILY AT BEDTIME; Therapy: 41RTX2145 to (Evaluate:12Apr2016)  Requested for: 77IQZ3145; Last   Rx:15Oct2015 Ordered    Allergies    1  No Known Drug Allergies    2  No Known Environmental Allergies   3   No Known Food Allergies    Signatures   Electronically signed by : TARUN Shook; Mar  3 2016  9:42AM EST                       (Author)    Electronically signed by : JONI Hollis ; Mar  3 2016 10:57AM EST

## 2018-05-08 ENCOUNTER — TELEPHONE (OUTPATIENT)
Dept: INTERNAL MEDICINE CLINIC | Facility: CLINIC | Age: 73
End: 2018-05-08

## 2018-05-08 ENCOUNTER — OFFICE VISIT (OUTPATIENT)
Dept: INTERNAL MEDICINE CLINIC | Facility: CLINIC | Age: 73
End: 2018-05-08
Payer: COMMERCIAL

## 2018-05-08 VITALS
RESPIRATION RATE: 16 BRPM | BODY MASS INDEX: 26.46 KG/M2 | HEART RATE: 68 BPM | SYSTOLIC BLOOD PRESSURE: 112 MMHG | DIASTOLIC BLOOD PRESSURE: 48 MMHG | OXYGEN SATURATION: 95 % | HEIGHT: 64 IN | WEIGHT: 155 LBS

## 2018-05-08 DIAGNOSIS — E78.5 DYSLIPIDEMIA: ICD-10-CM

## 2018-05-08 DIAGNOSIS — R10.30 LOWER ABDOMINAL PAIN: ICD-10-CM

## 2018-05-08 DIAGNOSIS — Z79.4 TYPE 2 DIABETES MELLITUS WITHOUT COMPLICATION, WITH LONG-TERM CURRENT USE OF INSULIN (HCC): Primary | ICD-10-CM

## 2018-05-08 DIAGNOSIS — E11.9 TYPE 2 DIABETES MELLITUS WITHOUT COMPLICATION, WITH LONG-TERM CURRENT USE OF INSULIN (HCC): Primary | ICD-10-CM

## 2018-05-08 DIAGNOSIS — Z13.6 SCREENING FOR AAA (ABDOMINAL AORTIC ANEURYSM): ICD-10-CM

## 2018-05-08 DIAGNOSIS — G91.2 NORMAL PRESSURE HYDROCEPHALUS (HCC): ICD-10-CM

## 2018-05-08 DIAGNOSIS — G91.2 NORMAL PRESSURE HYDROCEPHALUS (HCC): Primary | ICD-10-CM

## 2018-05-08 DIAGNOSIS — Z95.4 H/O ALLOGRAFT AORTIC VALVE REPLACEMENT: ICD-10-CM

## 2018-05-08 PROCEDURE — 1101F PT FALLS ASSESS-DOCD LE1/YR: CPT | Performed by: NURSE PRACTITIONER

## 2018-05-08 PROCEDURE — 99215 OFFICE O/P EST HI 40 MIN: CPT | Performed by: NURSE PRACTITIONER

## 2018-05-08 RX ORDER — ATORVASTATIN CALCIUM 20 MG/1
20 TABLET, FILM COATED ORAL
Qty: 90 TABLET | Refills: 0 | Status: SHIPPED | OUTPATIENT
Start: 2018-05-08 | End: 2018-08-03 | Stop reason: SDUPTHER

## 2018-05-08 RX ORDER — BLOOD-GLUCOSE METER
EACH MISCELLANEOUS 4 TIMES DAILY
Qty: 1 KIT | Refills: 0 | Status: SHIPPED | OUTPATIENT
Start: 2018-05-08 | End: 2020-09-28 | Stop reason: SDUPTHER

## 2018-05-08 RX ORDER — BLOOD-GLUCOSE METER
EACH MISCELLANEOUS 3 TIMES DAILY
Qty: 1 KIT | Refills: 0 | Status: SHIPPED | OUTPATIENT
Start: 2018-05-08 | End: 2018-05-08

## 2018-05-08 RX ORDER — SERTRALINE HYDROCHLORIDE 25 MG/1
1 TABLET, FILM COATED ORAL DAILY
COMMUNITY
Start: 2016-11-25 | End: 2018-05-08

## 2018-05-08 RX ORDER — ASPIRIN 325 MG
1 TABLET, DELAYED RELEASE (ENTERIC COATED) ORAL DAILY
COMMUNITY
Start: 2015-10-15 | End: 2022-03-08 | Stop reason: ALTCHOICE

## 2018-05-08 RX ORDER — LANCETS 33 GAUGE
EACH MISCELLANEOUS
Qty: 120 EACH | Refills: 0 | Status: SHIPPED | OUTPATIENT
Start: 2018-05-08 | End: 2018-05-08

## 2018-05-08 RX ORDER — LANCETS 33 GAUGE
EACH MISCELLANEOUS
COMMUNITY
Start: 2016-03-14 | End: 2018-05-08 | Stop reason: SDUPTHER

## 2018-05-08 NOTE — TELEPHONE ENCOUNTER
Neuro called in stating based off pt's dx that he should be referred to neurosurgery not neurology - he sees Dr Amber Farley so if you can please put an updated referral in his chart i've already done one for his insurance, thank you!

## 2018-05-09 ENCOUNTER — TRANSCRIBE ORDERS (OUTPATIENT)
Dept: LAB | Facility: HOSPITAL | Age: 73
End: 2018-05-09

## 2018-05-09 ENCOUNTER — APPOINTMENT (OUTPATIENT)
Dept: LAB | Facility: HOSPITAL | Age: 73
End: 2018-05-09
Payer: COMMERCIAL

## 2018-05-09 DIAGNOSIS — Z79.4 TYPE 2 DIABETES MELLITUS WITHOUT COMPLICATION, WITH LONG-TERM CURRENT USE OF INSULIN (HCC): ICD-10-CM

## 2018-05-09 DIAGNOSIS — E11.9 TYPE 2 DIABETES MELLITUS WITHOUT COMPLICATION, WITH LONG-TERM CURRENT USE OF INSULIN (HCC): ICD-10-CM

## 2018-05-09 LAB
ALBUMIN SERPL BCP-MCNC: 3.6 G/DL (ref 3.5–5)
ALP SERPL-CCNC: 122 U/L (ref 46–116)
ALT SERPL W P-5'-P-CCNC: 47 U/L (ref 12–78)
ANION GAP SERPL CALCULATED.3IONS-SCNC: 5 MMOL/L (ref 4–13)
AST SERPL W P-5'-P-CCNC: 33 U/L (ref 5–45)
BASOPHILS # BLD AUTO: 0.05 THOUSANDS/ΜL (ref 0–0.1)
BASOPHILS NFR BLD AUTO: 1 % (ref 0–1)
BILIRUB SERPL-MCNC: 0.62 MG/DL (ref 0.2–1)
BUN SERPL-MCNC: 22 MG/DL (ref 5–25)
CALCIUM SERPL-MCNC: 9.5 MG/DL (ref 8.3–10.1)
CHLORIDE SERPL-SCNC: 103 MMOL/L (ref 100–108)
CO2 SERPL-SCNC: 28 MMOL/L (ref 21–32)
CREAT SERPL-MCNC: 1.05 MG/DL (ref 0.6–1.3)
CREAT UR-MCNC: 112 MG/DL
EOSINOPHIL # BLD AUTO: 0.4 THOUSAND/ΜL (ref 0–0.61)
EOSINOPHIL NFR BLD AUTO: 5 % (ref 0–6)
ERYTHROCYTE [DISTWIDTH] IN BLOOD BY AUTOMATED COUNT: 13.6 % (ref 11.6–15.1)
EST. AVERAGE GLUCOSE BLD GHB EST-MCNC: 192 MG/DL
GFR SERPL CREATININE-BSD FRML MDRD: 70 ML/MIN/1.73SQ M
GLUCOSE P FAST SERPL-MCNC: 201 MG/DL (ref 65–99)
HBA1C MFR BLD: 8.3 % (ref 4.2–6.3)
HCT VFR BLD AUTO: 45.6 % (ref 36.5–49.3)
HGB BLD-MCNC: 15.2 G/DL (ref 12–17)
LYMPHOCYTES # BLD AUTO: 1.3 THOUSANDS/ΜL (ref 0.6–4.47)
LYMPHOCYTES NFR BLD AUTO: 16 % (ref 14–44)
MCH RBC QN AUTO: 27.9 PG (ref 26.8–34.3)
MCHC RBC AUTO-ENTMCNC: 33.3 G/DL (ref 31.4–37.4)
MCV RBC AUTO: 84 FL (ref 82–98)
MICROALBUMIN UR-MCNC: 306 MG/L (ref 0–20)
MICROALBUMIN/CREAT 24H UR: 273 MG/G CREATININE (ref 0–30)
MONOCYTES # BLD AUTO: 1.04 THOUSAND/ΜL (ref 0.17–1.22)
MONOCYTES NFR BLD AUTO: 13 % (ref 4–12)
NEUTROPHILS # BLD AUTO: 5.15 THOUSANDS/ΜL (ref 1.85–7.62)
NEUTS SEG NFR BLD AUTO: 65 % (ref 43–75)
NRBC BLD AUTO-RTO: 0 /100 WBCS
PLATELET # BLD AUTO: 186 THOUSANDS/UL (ref 149–390)
PMV BLD AUTO: 10.6 FL (ref 8.9–12.7)
POTASSIUM SERPL-SCNC: 4.3 MMOL/L (ref 3.5–5.3)
PROT SERPL-MCNC: 8.8 G/DL (ref 6.4–8.2)
RBC # BLD AUTO: 5.45 MILLION/UL (ref 3.88–5.62)
SODIUM SERPL-SCNC: 136 MMOL/L (ref 136–145)
T4 FREE SERPL-MCNC: 1.1 NG/DL (ref 0.76–1.46)
TSH SERPL DL<=0.05 MIU/L-ACNC: 3.84 UIU/ML (ref 0.36–3.74)
WBC # BLD AUTO: 7.95 THOUSAND/UL (ref 4.31–10.16)

## 2018-05-09 PROCEDURE — 83036 HEMOGLOBIN GLYCOSYLATED A1C: CPT

## 2018-05-09 PROCEDURE — 85025 COMPLETE CBC W/AUTO DIFF WBC: CPT

## 2018-05-09 PROCEDURE — 3060F POS MICROALBUMINURIA REV: CPT | Performed by: NURSE PRACTITIONER

## 2018-05-09 PROCEDURE — 36415 COLL VENOUS BLD VENIPUNCTURE: CPT

## 2018-05-09 PROCEDURE — 84439 ASSAY OF FREE THYROXINE: CPT

## 2018-05-09 PROCEDURE — 82570 ASSAY OF URINE CREATININE: CPT | Performed by: NURSE PRACTITIONER

## 2018-05-09 PROCEDURE — 84443 ASSAY THYROID STIM HORMONE: CPT

## 2018-05-09 PROCEDURE — 82043 UR ALBUMIN QUANTITATIVE: CPT | Performed by: NURSE PRACTITIONER

## 2018-05-09 PROCEDURE — 80053 COMPREHEN METABOLIC PANEL: CPT

## 2018-05-30 ENCOUNTER — HOSPITAL ENCOUNTER (OUTPATIENT)
Dept: RADIOLOGY | Facility: HOSPITAL | Age: 73
Discharge: HOME | End: 2018-05-30
Payer: COMMERCIAL

## 2018-05-30 DIAGNOSIS — Z13.6 SCREENING FOR AAA (ABDOMINAL AORTIC ANEURYSM): ICD-10-CM

## 2018-05-30 PROCEDURE — 76706 US ABDL AORTA SCREEN AAA: CPT

## 2018-06-07 DIAGNOSIS — E11.9 TYPE 2 DIABETES MELLITUS WITHOUT COMPLICATION, WITH LONG-TERM CURRENT USE OF INSULIN (HCC): ICD-10-CM

## 2018-06-07 DIAGNOSIS — Z79.4 TYPE 2 DIABETES MELLITUS WITHOUT COMPLICATION, WITH LONG-TERM CURRENT USE OF INSULIN (HCC): ICD-10-CM

## 2018-06-07 RX ORDER — BLOOD SUGAR DIAGNOSTIC
STRIP MISCELLANEOUS
Qty: 100 EACH | Refills: 2 | Status: SHIPPED | OUTPATIENT
Start: 2018-06-07 | End: 2018-08-31 | Stop reason: SDUPTHER

## 2018-06-22 DIAGNOSIS — E11.9 TYPE 2 DIABETES MELLITUS WITHOUT COMPLICATION, WITH LONG-TERM CURRENT USE OF INSULIN (HCC): ICD-10-CM

## 2018-06-22 DIAGNOSIS — Z79.4 TYPE 2 DIABETES MELLITUS WITHOUT COMPLICATION, WITH LONG-TERM CURRENT USE OF INSULIN (HCC): ICD-10-CM

## 2018-06-22 RX ORDER — LANCETS 33 GAUGE
EACH MISCELLANEOUS 4 TIMES DAILY
Qty: 200 EACH | Refills: 0 | Status: SHIPPED | OUTPATIENT
Start: 2018-06-22 | End: 2018-08-09 | Stop reason: SDUPTHER

## 2018-06-28 ENCOUNTER — OFFICE VISIT (OUTPATIENT)
Dept: CARDIOLOGY CLINIC | Facility: CLINIC | Age: 73
End: 2018-06-28
Payer: COMMERCIAL

## 2018-06-28 VITALS
OXYGEN SATURATION: 97 % | HEART RATE: 60 BPM | DIASTOLIC BLOOD PRESSURE: 72 MMHG | WEIGHT: 154 LBS | BODY MASS INDEX: 26.29 KG/M2 | HEIGHT: 64 IN | SYSTOLIC BLOOD PRESSURE: 128 MMHG

## 2018-06-28 DIAGNOSIS — I10 BENIGN ESSENTIAL HYPERTENSION: Chronic | ICD-10-CM

## 2018-06-28 DIAGNOSIS — I25.10 CORONARY ARTERY DISEASE INVOLVING NATIVE CORONARY ARTERY OF NATIVE HEART WITHOUT ANGINA PECTORIS: ICD-10-CM

## 2018-06-28 DIAGNOSIS — I35.0 NONRHEUMATIC AORTIC VALVE STENOSIS: Primary | ICD-10-CM

## 2018-06-28 DIAGNOSIS — Z95.2 H/O AORTIC VALVE REPLACEMENT: ICD-10-CM

## 2018-06-28 DIAGNOSIS — T82.897D AORTIC PROSTHETIC VALVE REGURGITATION, SUBSEQUENT ENCOUNTER: ICD-10-CM

## 2018-06-28 PROCEDURE — 99214 OFFICE O/P EST MOD 30 MIN: CPT | Performed by: INTERNAL MEDICINE

## 2018-06-28 PROCEDURE — 93000 ELECTROCARDIOGRAM COMPLETE: CPT | Performed by: INTERNAL MEDICINE
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
## 2018-06-30 ENCOUNTER — HOSPITAL ENCOUNTER (EMERGENCY)
Facility: HOSPITAL | Age: 73
Discharge: HOME | End: 2018-06-30
Attending: EMERGENCY MEDICINE
Payer: COMMERCIAL

## 2018-06-30 ENCOUNTER — APPOINTMENT (EMERGENCY)
Dept: RADIOLOGY | Facility: HOSPITAL | Age: 73
End: 2018-06-30
Payer: COMMERCIAL

## 2018-06-30 VITALS
SYSTOLIC BLOOD PRESSURE: 146 MMHG | WEIGHT: 154 LBS | RESPIRATION RATE: 16 BRPM | DIASTOLIC BLOOD PRESSURE: 89 MMHG | BODY MASS INDEX: 26.43 KG/M2 | HEART RATE: 62 BPM | TEMPERATURE: 97.5 F | OXYGEN SATURATION: 96 %

## 2018-06-30 DIAGNOSIS — R80.9 PROTEINURIA: ICD-10-CM

## 2018-06-30 DIAGNOSIS — R10.9 FLANK PAIN: Primary | ICD-10-CM

## 2018-06-30 LAB
ALBUMIN SERPL BCP-MCNC: 3.5 G/DL (ref 3.5–5)
ALP SERPL-CCNC: 115 U/L (ref 46–116)
ALT SERPL W P-5'-P-CCNC: 39 U/L (ref 12–78)
ANION GAP SERPL CALCULATED.3IONS-SCNC: 6 MMOL/L (ref 4–13)
AST SERPL W P-5'-P-CCNC: 35 U/L (ref 5–45)
BACTERIA UR QL AUTO: ABNORMAL /HPF
BASOPHILS # BLD AUTO: 0.04 THOUSANDS/ΜL (ref 0–0.1)
BASOPHILS NFR BLD AUTO: 1 % (ref 0–1)
BILIRUB SERPL-MCNC: 0.34 MG/DL (ref 0.2–1)
BILIRUB UR QL STRIP: NEGATIVE
BUN SERPL-MCNC: 22 MG/DL (ref 5–25)
CALCIUM SERPL-MCNC: 9.1 MG/DL (ref 8.3–10.1)
CHLORIDE SERPL-SCNC: 107 MMOL/L (ref 100–108)
CLARITY UR: CLEAR
CO2 SERPL-SCNC: 23 MMOL/L (ref 21–32)
COLOR UR: YELLOW
COLOR, POC: YELLOW
CREAT SERPL-MCNC: 1.07 MG/DL (ref 0.6–1.3)
EOSINOPHIL # BLD AUTO: 0.38 THOUSAND/ΜL (ref 0–0.61)
EOSINOPHIL NFR BLD AUTO: 5 % (ref 0–6)
ERYTHROCYTE [DISTWIDTH] IN BLOOD BY AUTOMATED COUNT: 14 % (ref 11.6–15.1)
GFR SERPL CREATININE-BSD FRML MDRD: 68 ML/MIN/1.73SQ M
GLUCOSE SERPL-MCNC: 259 MG/DL (ref 65–140)
GLUCOSE UR STRIP-MCNC: ABNORMAL MG/DL
HCT VFR BLD AUTO: 42.7 % (ref 36.5–49.3)
HGB BLD-MCNC: 13.5 G/DL (ref 12–17)
HGB UR QL STRIP.AUTO: NEGATIVE
HYALINE CASTS #/AREA URNS LPF: ABNORMAL /LPF
IMM GRANULOCYTES # BLD AUTO: 0.03 THOUSAND/UL (ref 0–0.2)
IMM GRANULOCYTES NFR BLD AUTO: 0 % (ref 0–2)
KETONES UR STRIP-MCNC: NEGATIVE MG/DL
LEUKOCYTE ESTERASE UR QL STRIP: NEGATIVE
LIPASE SERPL-CCNC: 121 U/L (ref 73–393)
LYMPHOCYTES # BLD AUTO: 1.26 THOUSANDS/ΜL (ref 0.6–4.47)
LYMPHOCYTES NFR BLD AUTO: 15 % (ref 14–44)
MCH RBC QN AUTO: 26.7 PG (ref 26.8–34.3)
MCHC RBC AUTO-ENTMCNC: 31.6 G/DL (ref 31.4–37.4)
MCV RBC AUTO: 84 FL (ref 82–98)
MONOCYTES # BLD AUTO: 1.26 THOUSAND/ΜL (ref 0.17–1.22)
MONOCYTES NFR BLD AUTO: 15 % (ref 4–12)
NEUTROPHILS # BLD AUTO: 5.31 THOUSANDS/ΜL (ref 1.85–7.62)
NEUTS SEG NFR BLD AUTO: 64 % (ref 43–75)
NITRITE UR QL STRIP: NEGATIVE
NON-SQ EPI CELLS URNS QL MICRO: ABNORMAL /HPF
NRBC BLD AUTO-RTO: 0 /100 WBCS
PH UR STRIP.AUTO: 5.5 [PH] (ref 4.5–8)
PLATELET # BLD AUTO: 169 THOUSANDS/UL (ref 149–390)
PMV BLD AUTO: 10.3 FL (ref 8.9–12.7)
POTASSIUM SERPL-SCNC: 4.3 MMOL/L (ref 3.5–5.3)
PROT SERPL-MCNC: 8.3 G/DL (ref 6.4–8.2)
PROT UR STRIP-MCNC: ABNORMAL MG/DL
RBC # BLD AUTO: 5.06 MILLION/UL (ref 3.88–5.62)
RBC #/AREA URNS AUTO: ABNORMAL /HPF
SODIUM SERPL-SCNC: 136 MMOL/L (ref 136–145)
SP GR UR STRIP.AUTO: 1.02 (ref 1–1.03)
UROBILINOGEN UR QL STRIP.AUTO: 0.2 E.U./DL
WBC # BLD AUTO: 8.28 THOUSAND/UL (ref 4.31–10.16)
WBC #/AREA URNS AUTO: ABNORMAL /HPF

## 2018-06-30 PROCEDURE — 96361 HYDRATE IV INFUSION ADD-ON: CPT

## 2018-06-30 PROCEDURE — 96374 THER/PROPH/DIAG INJ IV PUSH: CPT

## 2018-06-30 PROCEDURE — 85025 COMPLETE CBC W/AUTO DIFF WBC: CPT | Performed by: EMERGENCY MEDICINE

## 2018-06-30 PROCEDURE — 99284 EMERGENCY DEPT VISIT MOD MDM: CPT

## 2018-06-30 PROCEDURE — 81001 URINALYSIS AUTO W/SCOPE: CPT

## 2018-06-30 PROCEDURE — 83690 ASSAY OF LIPASE: CPT | Performed by: EMERGENCY MEDICINE

## 2018-06-30 PROCEDURE — 80053 COMPREHEN METABOLIC PANEL: CPT | Performed by: EMERGENCY MEDICINE

## 2018-06-30 PROCEDURE — 36415 COLL VENOUS BLD VENIPUNCTURE: CPT | Performed by: EMERGENCY MEDICINE

## 2018-06-30 PROCEDURE — 74177 CT ABD & PELVIS W/CONTRAST: CPT

## 2018-06-30 RX ORDER — KETOROLAC TROMETHAMINE 30 MG/ML
15 INJECTION, SOLUTION INTRAMUSCULAR; INTRAVENOUS ONCE
Status: COMPLETED | OUTPATIENT
Start: 2018-06-30 | End: 2018-06-30

## 2018-06-30 RX ADMIN — SODIUM CHLORIDE 1000 ML: 0.9 INJECTION, SOLUTION INTRAVENOUS at 16:09

## 2018-06-30 RX ADMIN — IOHEXOL 100 ML: 350 INJECTION, SOLUTION INTRAVENOUS at 16:45

## 2018-06-30 RX ADMIN — KETOROLAC TROMETHAMINE 15 MG: 30 INJECTION, SOLUTION INTRAMUSCULAR at 17:15
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
## 2018-06-30 NOTE — DISCHARGE INSTRUCTIONS
Flank Pain   WHAT YOU NEED TO KNOW:   Flank pain is felt in the area below your ribcage and above your hip bones, often in the lower back  Your pain may be dull or so severe that you cannot get comfortable  The pain may stay in one area or radiate to another area  It may worsen and lighten in waves  Flank pain is often a sign of problems with your urinary tract, such as a kidney stone or infection  DISCHARGE INSTRUCTIONS:   Return to the emergency department if:   · You have a fever  · Your heart is fluttering or jumping  · You see blood in your urine  · Your pain radiates into your lower abdomen and genital area  · You have intense pain in your low back next to your spine  · You are much more tired than usual and have no desire to eat  · You have a headache and your muscles jerk  Contact your healthcare provider if:   · You have an upset stomach and are vomiting  · You have to urinate more often, and with urgency  · Your pain worsens or does not improve, and you cannot get comfortable  · You pass a stone when you urinate  · You have questions or concerns about your condition or care  Medicines: The following medicines may be ordered for you:  · Pain medicine  may help decrease or relieve your pain  Do not wait until the pain is severe before you take your medicine  · Antibiotics  may help treat a urinary tract infection caused by bacteria  · Take your medicine as directed  Contact your healthcare provider if you think your medicine is not helping or if you have side effects  Tell him of her if you are allergic to any medicine  Keep a list of the medicines, vitamins, and herbs you take  Include the amounts, and when and why you take them  Bring the list or the pill bottles to follow-up visits  Carry your medicine list with you in case of an emergency    Follow up with your healthcare provider in 1 to 2 days or as directed:  Write down your questions so you remember to ask them during your visits  © 2017 [address] [employer] Information is for End User's use only and may not be sold, redistributed or otherwise used for commercial purposes  All illustrations and images included in CareNotes® are the copyrighted property of [employer]  or Sebastian Montague  The above information is an  only  It is not intended as medical advice for individual conditions or treatments  Talk to your doctor, nurse or pharmacist before following any medical regimen to see if it is safe and effective for you
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
## 2018-07-31 ENCOUNTER — OFFICE VISIT (OUTPATIENT)
Dept: NEUROLOGY | Facility: CLINIC | Age: 73
End: 2018-07-31
Payer: COMMERCIAL

## 2018-07-31 VITALS
HEART RATE: 67 BPM | SYSTOLIC BLOOD PRESSURE: 120 MMHG | BODY MASS INDEX: 26.26 KG/M2 | WEIGHT: 153 LBS | DIASTOLIC BLOOD PRESSURE: 74 MMHG

## 2018-07-31 DIAGNOSIS — M79.18 MYOFASCIAL PAIN SYNDROME, CERVICAL: ICD-10-CM

## 2018-07-31 DIAGNOSIS — R42 DIZZINESS AND GIDDINESS: ICD-10-CM

## 2018-07-31 DIAGNOSIS — R13.10 DYSPHAGIA, UNSPECIFIED TYPE: Primary | ICD-10-CM

## 2018-07-31 DIAGNOSIS — G91.2 NORMAL PRESSURE HYDROCEPHALUS (HCC): ICD-10-CM

## 2018-07-31 PROCEDURE — 99205 OFFICE O/P NEW HI 60 MIN: CPT | Performed by: PSYCHIATRY & NEUROLOGY

## 2018-07-31 RX ORDER — CYCLOBENZAPRINE HCL 10 MG
10 TABLET ORAL
Qty: 30 TABLET | Refills: 3 | Status: SHIPPED | OUTPATIENT
Start: 2018-07-31 | End: 2018-11-14 | Stop reason: SDUPTHER

## 2018-07-31 NOTE — PROGRESS NOTES
Patient ID: Gloria Ayala is a 67 y o  male  Assessment/Plan:     Problem List Items Addressed This Visit        Digestive    Dysphagia - Primary    Relevant Orders    Ambulatory referral to Speech Therapy       Nervous and Auditory    Normal pressure hydrocephalus    Relevant Orders    CT head wo contrast    CT angiogram head with and without contrast       Other    Myofascial pain syndrome, cervical    Relevant Medications    cyclobenzaprine (FLEXERIL) 10 mg tablet    Dizziness and giddiness    Relevant Orders    CT head wo contrast    CBC and differential    Comprehensive metabolic panel    TSH, 3rd generation with T4 reflex    CT angiogram head with and without contrast         Today I had the pleasure of seeing your patient, Gloria Ayala, in consultation at [facility]  Mr Yodit Gardner has presented for evaluation of transient dizziness, dysphagia, blurriness and upper neck muscle pain  Patient described no abnormal gait function, no falls, no double vision, no infection and no headaches, with stable bladder function  CT head will be repeated as a follow up on NPH, with no clinical deterioration has been noted, with concern patient has AFib and not on anticoagulation  CTA will complete his evaluation for dizziness and giddiness triggered by changing his position  Patient will have CBC/CMP/TSH  Speech therapy for evaluation of dysphagia will be also recommended  Patient has no focal weakness, with no tenderness to palpation along his spine- muscle pain in upper neck and shoulder was pronounced, he will start with flexeril 10 mg HS and stretching and may require TPI by our physiatry team      Patient was asked to follow on as needed bases and we will discuss his findings upon completion over the phone       Greater than 50% of the 60 minutes evaluation was a face-to-face discussion regarding  the pathophysiology of his current symptoms and further plan, as well as counseling, educating, and coordinating the patient's care  Subjective: history of NPH, neck pain and stiffness  HPI/History of Present Illness  Dr J Carlos Llanes had evaluation in June 2015 with NPH diagnosis and VPH was placed with follow up with Teagan Dye completed in 2016  Patient has been in [facility] twice within 2 years  Mr Salud Dumont  Has resented for NPH, with  shunt was placed 3 year ago  CT head was completed 12/2016 with stable findings described  Patient has been following with Dr Teagan Dye  Patient has a history of DM, CAD, AFib, aortic valve stenosis  Patient describe feeling tired on walking after he returned from [address]  Constipation is an issue and must be addressed with PCP  Neck stiffness and neck pain has been described  Patient has been depressed anxious with mood swings  Patient described shadow, upfront from side to side, once in a while, no vision loss or double  Patient has been describing dysphagia, chocking on food, requiring water  Patient had heart surgery  Patient has AFib and he has been on aspirin, with aortic valve replacement  Patient has been following with GI team on scheduled bases  Patient stated he has no bladder incontinence  Patient stated that his memory is progressed since 2015  He forgets conversation, appointment  And names  No speech related issues  CT head was last done in December 2016 within 8 months after VPS placement, with stable ventricular system noted  No falls, but wabling on walking - he is hiking 2 miles 3 times a week  No CP nor SOB on walking     The following portions of the patient's history were reviewed and updated as appropriate:   He  has a past medical history of Anemia; Arthritis; CAD (coronary artery disease); Cardiac murmur; Depression; Diabetes mellitus ([address] ); GERD (gastroesophageal reflux disease); History of blood transfusion; Hydrocephalus; and PAF (paroxysmal atrial fibrillation) ([address] )    He   Patient Active Problem List    Diagnosis Date Noted    Dysphagia 07/31/2018    Myofascial pain syndrome, cervical 07/31/2018    Dizziness and giddiness 07/31/2018    H/O aortic valve replacement 06/28/2018    GERD (gastroesophageal reflux disease) 03/15/2016    Normal pressure hydrocephalus 03/14/2016    Benign essential hypertension 03/14/2016    Type 2 diabetes mellitus ([facility] [address] ) 03/14/2016    Coronary artery disease 11/03/2015    Aortic prosthetic valve regurgitation 08/29/2014    Aortic stenosis 08/28/2014     He  has a past surgical history that includes Bowel resection; Valve replacement; Aortic valve replacement; Colon surgery (2014); Colon surgery (1980); and pr create shunt:ventric-peritoneal (Right, 3/14/2016)  His family history includes Alzheimer's disease in his mother; Anxiety disorder in his child; Cancer in his other; Depression in his child; Diabetes in his father, mother, and other; Glaucoma in his father, mother, and other; Heart attack in his father; Hyperlipidemia in his father, mother, and other; Kidney disease in his father, mother, and other; Other in his mother and other; Pancreatitis in his other; Rheum arthritis in his child; Stroke in his mother  He  reports that he has quit smoking  He has quit using smokeless tobacco  He reports that he does not drink alcohol or use drugs    Current Outpatient Prescriptions   Medication Sig Dispense Refill    ACCU-CHEK SMARTVIEW test strip CHECK BLOODSUGAR THREE TIMES A  each 2    aspirin (ECOTRIN) 325 mg EC tablet Take 1 tablet by mouth daily      atorvastatin (LIPITOR) 20 mg tablet Take 1 tablet (20 mg total) by mouth daily at bedtime 90 tablet 0    Blood Glucose Monitoring Suppl (ACCU-CHEK ALEM SMARTVIEW) w/Device KIT by Does not apply route 4 (four) times a day 1 kit 0    COMFORT EZ PEN NEEDLES 32G X 5 MM MISC BY DOES NOT APPLY ROUTE 4 (FOUR) TIMES A  each 0    insulin aspart (NovoLOG) 100 Units/mL SOPN 12 units with breakfast, 8 with lunch, 8 with dinner 5 pen 3    insulin glargine (LANTUS SOLOSTAR) injection pen 100 units/mL 14 units qhs 5 pen 2    cyclobenzaprine (FLEXERIL) 10 mg tablet Take 1 tablet (10 mg total) by mouth daily at bedtime 30 tablet 3     No current facility-administered medications for this visit  Current Outpatient Prescriptions on File Prior to Visit   Medication Sig    ACCU-CHEK SMARTVIEW test strip CHECK BLOODSUGAR THREE TIMES A DAY    aspirin (ECOTRIN) 325 mg EC tablet Take 1 tablet by mouth daily    atorvastatin (LIPITOR) 20 mg tablet Take 1 tablet (20 mg total) by mouth daily at bedtime    Blood Glucose Monitoring Suppl (ACCU-CHEK ALEM SMARTVIEW) w/Device KIT by Does not apply route 4 (four) times a day    COMFORT EZ PEN NEEDLES 32G X 5 MM MISC BY DOES NOT APPLY ROUTE 4 (FOUR) TIMES A DAY    insulin aspart (NovoLOG) 100 Units/mL SOPN 12 units with breakfast, 8 with lunch, 8 with dinner    insulin glargine (LANTUS SOLOSTAR) injection pen 100 units/mL 14 units qhs    [DISCONTINUED] glucose blood (ACCU-CHEK SMARTVIEW) test strip Use as instructed     No current facility-administered medications on file prior to visit  He is allergic to chlorhexidine            Objective:    Blood pressure 120/74, pulse 67, weight 69 4 kg (153 lb)  Physical Exam/Neurological Exam  CONSTITUTIONAL: NAD, pleasant  NECK: supple, no lymphadenopathy, no thyromegaly, no JVD  CARDIOVASCULAR: RRR, normal S1S2, no murmurs, no rubs  RESP: clear to auscultation bilaterally, no wheezes/rhonchi/rales  ABDOMEN: soft, non tender, non distended  SKIN: no rash or skin lesions  EXTREMITIES: no edema, pulses 2+bilaterally  PSYCH: appropriate mood and affect  NEUROLOGIC COMPREHENSIVE EXAM: Patient is oriented to person, place and time, NAD; appropriate affect   CN II, III, IV, V, VI, VII,VIII,IX,X,XI-XII intact with EOMI, PERRLA, OKN intact, VF grossly intact, fundi poorly visualized secondary to pupillary constriction; symmetric face noted  Motor: 5/5 UE/LE bilateral symmetric; Sensory: intact to light touch and pinprick bilaterally; normal vibration sensation feet bilaterally; Coordination within normal limits on FTN and MARIAN testing; DTR: 1/4 through, no Babinski, no clonus  Tandem gait is mildly abnormal  Romberg: negative  ROS:  12 points of review of system was reviewed with the patient and was unremarkable with exception: see HPI  Review of Systems   Constitutional: Positive for fatigue  HENT: Positive for trouble swallowing  Eyes: Positive for visual disturbance  Respiratory: Negative  Cardiovascular: Negative  Gastrointestinal: Positive for constipation  Endocrine: Negative  Genitourinary: Positive for frequency  Musculoskeletal: Positive for arthralgias, back pain, gait problem, neck pain and neck stiffness  Skin: Negative  Allergic/Immunologic: Negative  Neurological: Positive for dizziness, light-headedness and headaches  Psychiatric/Behavioral: Positive for agitation, behavioral problems, confusion and sleep disturbance  The patient is nervous/anxious

## 2018-08-03 ENCOUNTER — HOSPITAL ENCOUNTER (OUTPATIENT)
Dept: NON INVASIVE DIAGNOSTICS | Facility: CLINIC | Age: 73
Discharge: HOME | End: 2018-08-03
Payer: COMMERCIAL

## 2018-08-03 DIAGNOSIS — Z95.2 H/O AORTIC VALVE REPLACEMENT: ICD-10-CM

## 2018-08-03 DIAGNOSIS — E78.5 DYSLIPIDEMIA: ICD-10-CM

## 2018-08-03 PROCEDURE — 93306 TTE W/DOPPLER COMPLETE: CPT | Performed by: INTERNAL MEDICINE

## 2018-08-03 PROCEDURE — 93306 TTE W/DOPPLER COMPLETE: CPT

## 2018-08-03 RX ORDER — ATORVASTATIN CALCIUM 20 MG/1
20 TABLET, FILM COATED ORAL
Qty: 90 TABLET | Refills: 0 | Status: SHIPPED | OUTPATIENT
Start: 2018-08-03 | End: 2018-10-27 | Stop reason: SDUPTHER

## 2018-08-07 ENCOUNTER — EVALUATION (OUTPATIENT)
Dept: SPEECH THERAPY | Facility: CLINIC | Age: 73
End: 2018-08-07
Payer: COMMERCIAL

## 2018-08-07 DIAGNOSIS — Z79.4 TYPE 2 DIABETES MELLITUS WITHOUT COMPLICATION, WITH LONG-TERM CURRENT USE OF INSULIN (HCC): ICD-10-CM

## 2018-08-07 DIAGNOSIS — E11.9 TYPE 2 DIABETES MELLITUS WITHOUT COMPLICATION, WITH LONG-TERM CURRENT USE OF INSULIN (HCC): ICD-10-CM

## 2018-08-07 DIAGNOSIS — R13.10 DYSPHAGIA, UNSPECIFIED TYPE: Primary | ICD-10-CM

## 2018-08-07 DIAGNOSIS — R13.12 OROPHARYNGEAL DYSPHAGIA: ICD-10-CM

## 2018-08-07 PROCEDURE — G8997 SWALLOW GOAL STATUS: HCPCS

## 2018-08-07 PROCEDURE — G8998 SWALLOW D/C STATUS: HCPCS

## 2018-08-07 PROCEDURE — G8996 SWALLOW CURRENT STATUS: HCPCS

## 2018-08-07 PROCEDURE — 92610 EVALUATE SWALLOWING FUNCTION: CPT

## 2018-08-07 RX ORDER — LANCETS 33 GAUGE
EACH MISCELLANEOUS 4 TIMES DAILY
Qty: 200 EACH | Refills: 0 | Status: CANCELLED | OUTPATIENT
Start: 2018-08-07

## 2018-08-07 NOTE — PROGRESS NOTES
Speech-Language Pathology Initial Evaluation    Today's date: 2018  Patients name: Marion Saavedra  : 1945  MRN: [id number]  Safety measures: N/A  Referring provider: Bhavya Davis, *  Dx:   Encounter Diagnosis     ICD-10-CM    1  Dysphagia, unspecified type R13 10 Ambulatory referral to Speech Therapy       Medical History significant for:   Past Medical History:   Diagnosis Date    Anemia     Arthritis     CAD (coronary artery disease)     Cardiac murmur     Depression     Resolved:16    Diabetes mellitus ([facility] [address] )     per [employer]: type 2    GERD (gastroesophageal reflux disease)     History of blood transfusion     , after firdt heart valve surgery in [address] [place] [address]    Hydrocephalus     PAF (paroxysmal atrial fibrillation) ([facility])        Medication List:   Current Outpatient Prescriptions   Medication Sig Dispense Refill    ACCU-CHEK SMARTVIEW test strip CHECK BLOODSUGAR THREE TIMES A  each 2    aspirin (ECOTRIN) 325 mg EC tablet Take 1 tablet by mouth daily      atorvastatin (LIPITOR) 20 mg tablet TAKE 1 TABLET (20 MG TOTAL) BY MOUTH DAILY AT BEDTIME 90 tablet 0    Blood Glucose Monitoring Suppl (ACCU-CHEK ALEM SMARTVIEW) w/Device KIT by Does not apply route 4 (four) times a day 1 kit 0    COMFORT EZ PEN NEEDLES 32G X 5 MM MISC BY DOES NOT APPLY ROUTE 4 (FOUR) TIMES A  each 0    cyclobenzaprine (FLEXERIL) 10 mg tablet Take 1 tablet (10 mg total) by mouth daily at bedtime 30 tablet 3    insulin aspart (NovoLOG) 100 Units/mL SOPN 12 units with breakfast, 8 with lunch, 8 with dinner 5 pen 3    insulin glargine (LANTUS SOLOSTAR) injection pen 100 units/mL 14 units qhs 5 pen 2     No current facility-administered medications for this visit  Allergies: Allergies   Allergen Reactions    Chlorhexidine Other (See Comments)     Dizziness, nausea with chlorhexidine soap     Subjective comments:     Patient's goal(s): Improve swallow function  Reason for referral: Difficulty swallowing solids or liquids  Prior functional status: Swallowing WNL  Clinically complex situations: N/A    History: Pt is a 66 y/o male who presents to this clinic for a dysphagia evaluation secondary to difficulty swallowing solids  Pt has a medical history of heart problems  Per neurologist report Pt has a complex medical history of DM, CAD, AFib, aortic valve stenosis  Patient also reports he has had heart surgery  Pt reports dysphagia symptoms began from insidious onset  Pt reports symptoms of food sticking in his throat, coughing and choking occasionally during meals  He indicated experiencing the most difficulty with dry and hard food such as crackers, root vegetables and difficulty chewing meat  He indicated he does not have difficulty eating soup, mixed consistencies such as cereal and soft foods  Pt's typical breakfast consist of sandwich with cheese, turkey and coffee, his lunch consists of rice, beans, and chicken, and dinner rice and steak  Pt stated he chews meat for a long time until it is soft and smooth to swallow  He indicated his wife hits his back to help the food go down when he experiences symptoms of food getting stuck in his throat  Pt also reports his swallowing and mastication are slower during meals  Pt denies any weight loss and recent pna  His goal in therapy is to be able to eat without having any difficulty swallowing  Hearing: WFL  Vision: WFL with glasses    Home environment/lifestyle: Pt lives with wife  Highest level of education: High School  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[employer]       Mental status: AlertBehavior status: Cooperative  Communication modalities: Verbal  Rehabilitation prognosis: Good rehab potential to reach the established goals    Assessments:  DYSPHAGIA EVALUATION:    -Reason for referral: Signs/symptoms of dysphagia    -Subjective report of swallowing difficulty: Coughing, Choking and Globus sensation     -Difficulty swallowing: Solids    -Current diet (solids): Regular  -Current diet (liquids): Thin        -Testing Variables: Pt was sitting upright in chair       -Facial appearance Symmetrical   -Mandible function Adequate ROM   -Dentition Partial dentures and Poor oral hygiene   -Labial function WFL   -Lingual function Decreased coordination   -Velar function Symmetrical   -Oral Apraxia? Absent    -Vocal Quality Clear/adequate   -Volitional Cough Strong/productive   -Respiration WFL   -Drooling? NO   -Tremor/Involuntary Movement? Not present   -Tracheostomy Present? NO       LIQUID CONSISTENCY TESTIN  Thin Liquid Consistency   Administered by: Cup, Straw and Self-fed   Strategies, attempts, and responses: None    CLINICAL FINDINGS:   Oral phase impairments: WFL   Pharyngeal Phase Impairments: Cough and Throat clear Pt coughed and cleared his throat x2 3 seconds after drinking water  *Behavioral response/consistency: overt signs of aspiration/penetration  *Laryngeal excursion upon palpation: Appeared WFL  *Liquid swallowing comments: Pt took 4 sips of water from cup, he then drank water through a straw  Pt coughed and cleared throat x2, he was also noted to have a delayed trigger for swallow  SOLID CONSISTENCY TESTIN  Regular Consistency (cookies)   Administered by: Self-fed   Strategies, attempts, and responses: None    CLINICAL FINDINGS:   Oral phase impairments: Stasis/coating/ pocketing prolonged mastication   Pharyngeal Phase Impairments: Swallow initiation Mild delay     *Behavioral response/consistency: no overt signs of aspiration/penetration  *Laryngeal excursion upon palpation: Appeared WFL  *Regular swallowing comments: pt reported feeling food stuck in his throat      2  Mechanical Soft Consistency (chewy bar)   Administered by: Self-fed   Strategies, attempts, and responses: None    CLINICAL FINDINGS:   Oral phase impairments: Other (Prologed mastication)   Pharyngeal Phase Impairments: WFL  *Behavioral response/consistency: no overt signs of aspiration/penetration  *Laryngeal excursion upon palpation: Appeared [facility]  *Mechanical soft swallowing comments: none    3  Puree Consistency (Apple sauce)   Administered by: Selestine Lie Strategies, attempts, and responses: None     CLINICAL FINDINGS:   Oral phase impairments: WFL   Pharyngeal Phase Impairments: WFL     *Behavioral response/consistency: no overt signs of aspiration/penetration  *Laryngeal excursion upon palpation: Appeared WFL  *Puree swallowing comments: none    4  Mixed Consistency (Fruit/juice)   Administered by: Selestine Lie Strategies, attempts, and responses: None    CLINICAL FINDINGS:   Oral phase impairments: WFL   Pharyngeal Phase Impairments: WFL      *Behavioral response/consistency: no overt signs of aspiration/penetration  *Laryngeal excursion upon palpation: Appeared WFL  *Mixed consistency swallowing comments: none    SWALLOWING DIAGNOSTIC IMPRESSION:  -Swallowing diagnosis/severity: Mild oropharyngeal phase dysphagia    -Factors affecting performance: None    -Safety concerns: Risk for aspiration    -Risk factors: Complex medical history    SAFETY PRECAUTIONS:  -Supervision: Independent     -Strategies: Small sips and bites when eating, Slow rate, swallow between bites, Alternate liquids and solids and Clear pocketing     -Positioning: Upright position at least 30 minutes after meal and Upright position during meals    -Recommend (solids): Regular    -Recommend (liquids): Thin        REFERRALS: Videofluroscopic Swallow Study     Dysphagia therapy will be determined following VFSS          Flowsheet Rows      Most Recent Value   SLP G-Codes   FOTO information reviewed  N/A   Assessment Type  Evaluation   Functional Limitations  Swallowing   Swallow Current Status ()  CK   Swallow Goal Status ()  CI          Impressions/ Recommendations  Impressions: Pt presents with mild oropharyngeal dysphagia c/b delayed trigger of swallow, stasis coating, globus sensation and coughing/throat clear after swallow  Chocking and excessive coughing during swallow evaluation was not observed by this clinician, however,  Pt reports coughing and choking while eating  meals at home  It is recommended pt participate in a VFSS to rule out aspiration/penetration, following swallow study resluts, dysphagia treatment will be determined  Recommendations:   Patients would benefit from: Dysphagia therapy   Frequency:2x weekly   Duration:Other 4-6 weeks    Intervention certification from: 1/8/1989  Intervention certification OX:6/0/9530       Visit Tracking:  -Referring provider: Epic  -Billing guidelines: AMA  -Visit #1/10   -Medicare  -RE due 9/7/2018

## 2018-08-07 NOTE — LETTER
2018    Meche Diamond MD  [address] [address]    Patient: Gloria Ayala   YOB: 1945   Date of Visit: 2018     Encounter Diagnosis     ICD-10-CM    1  Dysphagia, unspecified type R13 10 Ambulatory referral to Speech Therapy     FL barium swallow video w speech     SPEECH Plan of Care Cert/Re-Cert   2  Oropharyngeal dysphagia R13 12 FL barium swallow video w speech     SPEECH Plan of Care Cert/Re-Cert       Dear Dr Álvaro Alexander:    Please review the attached Plan of Care from Gloria Ayala recent visit  Please verify that you agree therapy should continue by signing the attached document and sending it back to our office  If you have any questions or concerns, please don't hesitate to call  Sincerely,    Jeffrey Vazquez, [address] [facility]      Referring Provider:     Based upon review of the patient's progress and continued therapy plan, it is my medical opinion that Gloria Ayala should continue speech therapy treatment at the Physical Therapy at [address], [place]  [address] [facility] Initial Evaluation    Today's date: 2018  Patients name: Gloria Ayala  : 1945  MRN: [id number]  Safety measures: N/A  Referring provider: Lio Arcos, *  Dx:   Encounter Diagnosis     ICD-10-CM    1   Dysphagia, unspecified type R13 10 Ambulatory referral to Speech Therapy       Medical History significant for:   Past Medical History:   Diagnosis Date    Anemia     Arthritis     CAD (coronary artery disease)     Cardiac murmur     Depression     Resolved:16    Diabetes mellitus ([facility] [address] )     per Allscripts: type 2    GERD (gastroesophageal reflux disease)     History of blood transfusion     , after firdt heart valve surgery in [address] [place] [address]    Hydrocephalus     PAF (paroxysmal atrial fibrillation) ([facility] )        Medication List:   Current Outpatient Prescriptions   Medication Sig Dispense Refill    ACCU-CHEK SMARTVIEW test strip CHECK BLOODSUGAR THREE TIMES A  each 2    aspirin (ECOTRIN) 325 mg EC tablet Take 1 tablet by mouth daily      atorvastatin (LIPITOR) 20 mg tablet TAKE 1 TABLET (20 MG TOTAL) BY MOUTH DAILY AT BEDTIME 90 tablet 0    Blood Glucose Monitoring Suppl (ACCU-CHEK ALEM SMARTVIEW) w/Device KIT by Does not apply route 4 (four) times a day 1 kit 0    COMFORT EZ PEN NEEDLES 32G X 5 MM MISC BY DOES NOT APPLY ROUTE 4 (FOUR) TIMES A  each 0    cyclobenzaprine (FLEXERIL) 10 mg tablet Take 1 tablet (10 mg total) by mouth daily at bedtime 30 tablet 3    insulin aspart (NovoLOG) 100 Units/mL SOPN 12 units with breakfast, 8 with lunch, 8 with dinner 5 pen 3    insulin glargine (LANTUS SOLOSTAR) injection pen 100 units/mL 14 units qhs 5 pen 2     No current facility-administered medications for this visit  Allergies: Allergies   Allergen Reactions    Chlorhexidine Other (See Comments)     Dizziness, nausea with chlorhexidine soap     Subjective comments:     Patient's goal(s): Improve swallow function  Reason for referral: Difficulty swallowing solids or liquids  Prior functional status: Swallowing WNL  Clinically complex situations: N/A    History: Pt is a 66 y/o male who presents to this clinic for a dysphagia evaluation secondary to difficulty swallowing solids  Pt has a medical history of heart problems  Per neurologist report Pt has a complex medical history of DM, CAD, AFib, aortic valve stenosis  Patient also reports he has had heart surgery  Pt reports dysphagia symptoms began from insidious onset  Pt reports symptoms of food sticking in his throat, coughing and choking occasionally during meals  He indicated experiencing the most difficulty with dry and hard food such as crackers, root vegetables and difficulty chewing meat   He indicated he does not have difficulty eating soup, mixed consistencies such as cereal and soft foods  Pt's typical breakfast consist of sandwich with cheese, turkey and coffee, his lunch consists of rice, beans, and chicken, and dinner rice and steak  Pt stated he chews meat for a long time until it is soft and smooth to swallow  He indicated his wife hits his back to help the food go down when he experiences symptoms of food getting stuck in his throat  Pt also reports his swallowing and mastication are slower during meals  Pt denies any weight loss and recent pna  His goal in therapy is to be able to eat without having any difficulty swallowing  Hearing: WFL  Vision: WFL with glasses    Home environment/lifestyle: Pt lives with wife  Highest level of education: High School  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[employer]  Mental status: AlertBehavior status: Cooperative  Communication modalities: Verbal  Rehabilitation prognosis: Good rehab potential to reach the established goals    Assessments:  DYSPHAGIA EVALUATION:    -Reason for referral: Signs/symptoms of dysphagia    -Subjective report of swallowing difficulty: Coughing, Choking and Globus sensation     -Difficulty swallowing: Solids    -Current diet (solids): Regular  -Current diet (liquids): Thin        -Testing Variables: Pt was sitting upright in chair       -Facial appearance Symmetrical   -Mandible function Adequate ROM   -Dentition Partial dentures and Poor oral hygiene   -Labial function WFL   -Lingual function Decreased coordination   -Velar function Symmetrical   -Oral Apraxia? Absent    -Vocal Quality Clear/adequate   -Volitional Cough Strong/productive   -Respiration WFL   -Drooling? NO   -Tremor/Involuntary Movement? Not present   -Tracheostomy Present? NO       LIQUID CONSISTENCY TESTIN   Thin Liquid Consistency   Administered by: Cup, Straw and Self-fed   Strategies, attempts, and responses: None    CLINICAL FINDINGS:   Oral phase impairments: WFL   Pharyngeal Phase Impairments: Cough and Throat clear Pt coughed and cleared his throat x2 3 seconds after drinking water  *Behavioral response/consistency: overt signs of aspiration/penetration  *Laryngeal excursion upon palpation: Appeared WFL  *Liquid swallowing comments: Pt took 4 sips of water from cup, he then drank water through a straw  Pt coughed and cleared throat x2, he was also noted to have a delayed trigger for swallow  SOLID CONSISTENCY TESTIN  Regular Consistency (cookies)   Administered by: Self-fed   Strategies, attempts, and responses: None    CLINICAL FINDINGS:   Oral phase impairments: Stasis/coating/ pocketing prolonged mastication   Pharyngeal Phase Impairments: Swallow initiation Mild delay     *Behavioral response/consistency: no overt signs of aspiration/penetration  *Laryngeal excursion upon palpation: Appeared WFL  *Regular swallowing comments: pt reported feeling food stuck in his throat  2  Mechanical Soft Consistency (chewy bar)   Administered by: Self-fed   Strategies, attempts, and responses: None    CLINICAL FINDINGS:   Oral phase impairments: Other (Prologed mastication)   Pharyngeal Phase Impairments: WFL  *Behavioral response/consistency: no overt signs of aspiration/penetration  *Laryngeal excursion upon palpation: Appeared [facility]  *Mechanical soft swallowing comments: none    3  Puree Consistency (Apple sauce)   Administered by: Jodi Rogers Strategies, attempts, and responses: None     CLINICAL FINDINGS:   Oral phase impairments: WFL   Pharyngeal Phase Impairments: WFL     *Behavioral response/consistency: no overt signs of aspiration/penetration  *Laryngeal excursion upon palpation: Appeared WFL  *Puree swallowing comments: none    4   Mixed Consistency (Fruit/juice)   Administered by: Jodi Rogers Strategies, attempts, and responses: None    CLINICAL FINDINGS:   Oral phase impairments: WFL   Pharyngeal Phase Impairments: WFL      *Behavioral response/consistency: no overt signs of aspiration/penetration  *Laryngeal excursion upon palpation: Appeared WFL  *Mixed consistency swallowing comments: none    SWALLOWING DIAGNOSTIC IMPRESSION:  -Swallowing diagnosis/severity: Mild oropharyngeal phase dysphagia    -Factors affecting performance: None    -Safety concerns: Risk for aspiration    -Risk factors: Complex medical history    SAFETY PRECAUTIONS:  -Supervision: Independent     -Strategies: Small sips and bites when eating, Slow rate, swallow between bites, Alternate liquids and solids and Clear pocketing     -Positioning: Upright position at least 30 minutes after meal and Upright position during meals    -Recommend (solids): Regular    -Recommend (liquids): Thin        REFERRALS: Videofluroscopic Swallow Study     Dysphagia therapy will be determined following VFSS  Flowsheet Rows      Most Recent Value   SLP G-Codes   FOTO information reviewed  N/A   Assessment Type  Evaluation   Functional Limitations  Swallowing   Swallow Current Status ()  CK   Swallow Goal Status ()  CI          Impressions/ Recommendations  Impressions: Pt presents with mild oropharyngeal dysphagia c/b delayed trigger of swallow, stasis coating, globus sensation and coughing/throat clear after swallow  Chocking and excessive coughing during swallow evaluation was not observed by this clinician, however,  Pt reports coughing and choking while eating  meals at home  It is recommended pt participate in a VFSS to rule out aspiration/penetration, following swallow study resluts, dysphagia treatment will be determined  Recommendations:   Patients would benefit from: Dysphagia therapy   Frequency:2x weekly   Duration:Other 4-6 weeks    Intervention certification from: 3/3/2291  Intervention certification XF:8/0/9616       Visit Tracking:  -Referring provider: Epic  -Billing guidelines: AMA  -Visit #1/10   -Medicare     -RE due 9/7/2018  Patient has not followed up with clinician recommendation of participating in a VFSS  Clinician attempted to contact patient via phone and spoke with his daughter who was made aware of the importance of patient scheduling an appointment for a swallow study  Patient did not schedule the appointment and will be d/c at this time       Flowsheet Rows      Most Recent Value   SLP G-Codes   FOTO information reviewed  N/A   Assessment Type  Evaluation   Functional Limitations  Swallowing   Swallow Current Status ()  CK   Swallow Goal Status ()  CI   Swallow Discharge Status ()  CI

## 2018-08-09 DIAGNOSIS — Z79.4 TYPE 2 DIABETES MELLITUS WITHOUT COMPLICATION, WITH LONG-TERM CURRENT USE OF INSULIN (HCC): ICD-10-CM

## 2018-08-09 DIAGNOSIS — E11.9 TYPE 2 DIABETES MELLITUS WITHOUT COMPLICATION, WITH LONG-TERM CURRENT USE OF INSULIN (HCC): ICD-10-CM

## 2018-08-14 ENCOUNTER — TELEPHONE (OUTPATIENT)
Dept: SPEECH THERAPY | Facility: CLINIC | Age: 73
End: 2018-08-14

## 2018-08-14 DIAGNOSIS — R42 DIZZINESS AND GIDDINESS: Primary | ICD-10-CM

## 2018-08-14 DIAGNOSIS — G91.2 NPH (NORMAL PRESSURE HYDROCEPHALUS) (HCC): ICD-10-CM

## 2018-08-31 DIAGNOSIS — Z79.4 TYPE 2 DIABETES MELLITUS WITHOUT COMPLICATION, WITH LONG-TERM CURRENT USE OF INSULIN (HCC): ICD-10-CM

## 2018-08-31 DIAGNOSIS — E11.9 TYPE 2 DIABETES MELLITUS WITHOUT COMPLICATION, WITH LONG-TERM CURRENT USE OF INSULIN (HCC): ICD-10-CM

## 2018-08-31 RX ORDER — BLOOD SUGAR DIAGNOSTIC
STRIP MISCELLANEOUS
Qty: 100 EACH | Refills: 2 | Status: SHIPPED | OUTPATIENT
Start: 2018-08-31 | End: 2018-11-27 | Stop reason: SDUPTHER

## 2018-09-05 ENCOUNTER — HOSPITAL ENCOUNTER (OUTPATIENT)
Dept: RADIOLOGY | Facility: HOSPITAL | Age: 73
Discharge: HOME | End: 2018-09-05
Attending: PSYCHIATRY & NEUROLOGY
Payer: COMMERCIAL

## 2018-09-05 ENCOUNTER — HOSPITAL ENCOUNTER (OUTPATIENT)
Dept: RADIOLOGY | Facility: HOSPITAL | Age: 73
Discharge: HOME | End: 2018-09-05
Attending: RADIOLOGY
Payer: COMMERCIAL

## 2018-09-05 ENCOUNTER — TELEPHONE (OUTPATIENT)
Dept: NEUROSURGERY | Facility: CLINIC | Age: 73
End: 2018-09-05

## 2018-09-05 DIAGNOSIS — G91.2 NPH (NORMAL PRESSURE HYDROCEPHALUS) (HCC): ICD-10-CM

## 2018-09-05 DIAGNOSIS — R42 DIZZINESS AND GIDDINESS: ICD-10-CM

## 2018-09-05 PROCEDURE — 70544 MR ANGIOGRAPHY HEAD W/O DYE: CPT

## 2018-09-05 PROCEDURE — 70547 MR ANGIOGRAPHY NECK W/O DYE: CPT

## 2018-09-05 PROCEDURE — 70551 MRI BRAIN STEM W/O DYE: CPT

## 2018-09-05 NOTE — TELEPHONE ENCOUNTER
Received call from radiology reporting that the patient had MRI and post procedural xray completed the shunt settings appear to have moved  Unfortunately no pre MRI xray completed so used imaging from after the shunt was placed (3/14/2016) shunt series 3/15/2016 - contacted hospital PA who will see the patient here in the office tomorrow to adjust settings - in the meantime educated about s/s of issues including change in mental status, lethargy, urinary symptoms, headache, etc  Daughter will bring Renard to ED if any signs of complication should arise

## 2018-09-06 ENCOUNTER — HOSPITAL ENCOUNTER (OUTPATIENT)
Dept: RADIOLOGY | Facility: HOSPITAL | Age: 73
Discharge: HOME | End: 2018-09-06
Payer: COMMERCIAL

## 2018-09-06 ENCOUNTER — OFFICE VISIT (OUTPATIENT)
Dept: NEUROSURGERY | Facility: CLINIC | Age: 73
End: 2018-09-06
Payer: COMMERCIAL

## 2018-09-06 ENCOUNTER — TRANSCRIBE ORDERS (OUTPATIENT)
Dept: RADIOLOGY | Facility: HOSPITAL | Age: 73
End: 2018-09-06

## 2018-09-06 DIAGNOSIS — G91.9 HYDROCEPHALUS (HCC): Primary | ICD-10-CM

## 2018-09-06 DIAGNOSIS — G91.9 HYDROCEPHALUS (HCC): ICD-10-CM

## 2018-09-06 PROCEDURE — 62252 CSF SHUNT REPROGRAM: CPT | Performed by: PHYSICIAN ASSISTANT

## 2018-09-06 PROCEDURE — 70250 X-RAY EXAM OF SKULL: CPT

## 2018-09-06 NOTE — PROGRESS NOTES
Neurosurgery    [address] 67 y o  male MRN: [id number]    Chief Complaint  Patient presents post: MRI completed 9/5/2018  No xray completed prior to the MRI to check shunt settings however one was completed after, a call was received advising the shunt valve settings appeared to have changed  After speaking to hospital SRI JACOBO it was determined the patient would be brought in to the office to adjust settings  History of Present Illness  Patient presents for shunt valve settings to be checked and adjusted  Arrived to the office with daughter and ambulated well to exam room without assistive device  Denies pain, headaches, nausea/vomiting, mental status changes, ambulatory or urinary/bowel issues  Patient speech clear and appropriate  FC  [place]  No facial asymmetry  Procedure  Imaging was reviewed and with the use of Precog programming equipment, settings set back to 4 as was previously noted in Dr Randall Celis progress notes  Discussion/Summary  After the settings adjusted to match the previously documented settings the patient was advised to have xray completed to recheck the settings  Xray was personally reviewed confirming shunt to be set at 4  Patient discharged home to follow-up as scheduled

## 2018-09-18 PROBLEM — N18.1 CHRONIC KIDNEY DISEASE (CKD) STAGE G1/A2, GLOMERULAR FILTRATION RATE (GFR) EQUAL TO OR GREATER THAN 90 ML/MIN/1.73 SQUARE METER AND ALBUMINURIA CREATININE RATIO BETWEEN 30-299 MG/G: Status: ACTIVE | Noted: 2018-09-18

## 2018-09-18 NOTE — PROGRESS NOTES
Patient has not followed up with clinician recommendation of participating in a VFSS  Clinician attempted to contact patient via phone and spoke with his daughter who was made aware of the importance of patient scheduling an appointment for a swallow study  Patient did not schedule the appointment and will be d/c at this time       Flowsheet Rows      Most Recent Value   SLP G-Codes   FOTO information reviewed  N/A   Assessment Type  Evaluation   Functional Limitations  Swallowing   Swallow Current Status ()  CK   Swallow Goal Status ()  CI   Swallow Discharge Status ()  CI

## 2018-10-27 DIAGNOSIS — E78.5 DYSLIPIDEMIA: ICD-10-CM

## 2018-10-29 RX ORDER — ATORVASTATIN CALCIUM 20 MG/1
20 TABLET, FILM COATED ORAL
Qty: 90 TABLET | Refills: 0 | Status: SHIPPED | OUTPATIENT
Start: 2018-10-29 | End: 2019-01-23 | Stop reason: SDUPTHER

## 2018-11-05 ENCOUNTER — TELEPHONE (OUTPATIENT)
Dept: INTERNAL MEDICINE CLINIC | Facility: CLINIC | Age: 73
End: 2018-11-05

## 2018-11-13 DIAGNOSIS — Z79.4 TYPE 2 DIABETES MELLITUS WITHOUT COMPLICATION, WITH LONG-TERM CURRENT USE OF INSULIN (HCC): ICD-10-CM

## 2018-11-13 DIAGNOSIS — E11.9 TYPE 2 DIABETES MELLITUS WITHOUT COMPLICATION, WITH LONG-TERM CURRENT USE OF INSULIN (HCC): ICD-10-CM

## 2018-11-13 RX ORDER — INSULIN ASPART 100 [IU]/ML
INJECTION, SOLUTION INTRAVENOUS; SUBCUTANEOUS
Qty: 15 ML | Refills: 3 | Status: SHIPPED | OUTPATIENT
Start: 2018-11-13 | End: 2020-09-17 | Stop reason: SDUPTHER

## 2018-11-14 DIAGNOSIS — M79.18 MYOFASCIAL PAIN SYNDROME, CERVICAL: ICD-10-CM

## 2018-11-14 RX ORDER — CYCLOBENZAPRINE HCL 10 MG
10 TABLET ORAL
Qty: 30 TABLET | Refills: 3 | Status: SHIPPED | OUTPATIENT
Start: 2018-11-14 | End: 2018-11-23 | Stop reason: ALTCHOICE

## 2018-11-23 ENCOUNTER — OFFICE VISIT (OUTPATIENT)
Dept: INTERNAL MEDICINE CLINIC | Facility: CLINIC | Age: 73
End: 2018-11-23
Payer: COMMERCIAL

## 2018-11-23 VITALS
WEIGHT: 155.6 LBS | BODY MASS INDEX: 25.92 KG/M2 | HEART RATE: 64 BPM | SYSTOLIC BLOOD PRESSURE: 144 MMHG | RESPIRATION RATE: 16 BRPM | HEIGHT: 65 IN | DIASTOLIC BLOOD PRESSURE: 68 MMHG | OXYGEN SATURATION: 95 %

## 2018-11-23 DIAGNOSIS — N18.1 CHRONIC KIDNEY DISEASE (CKD) STAGE G1/A2, GLOMERULAR FILTRATION RATE (GFR) EQUAL TO OR GREATER THAN 90 ML/MIN/1.73 SQUARE METER AND ALBUMINURIA CREATININE RATIO BETWEEN 30-299 MG/G: ICD-10-CM

## 2018-11-23 DIAGNOSIS — Z12.5 SCREENING PSA (PROSTATE SPECIFIC ANTIGEN): ICD-10-CM

## 2018-11-23 DIAGNOSIS — I35.0 NONRHEUMATIC AORTIC VALVE STENOSIS: ICD-10-CM

## 2018-11-23 DIAGNOSIS — T82.897D AORTIC PROSTHETIC VALVE REGURGITATION, SUBSEQUENT ENCOUNTER: ICD-10-CM

## 2018-11-23 DIAGNOSIS — Z95.2 H/O AORTIC VALVE REPLACEMENT: ICD-10-CM

## 2018-11-23 DIAGNOSIS — E11.9 TYPE 2 DIABETES MELLITUS WITHOUT COMPLICATION, WITHOUT LONG-TERM CURRENT USE OF INSULIN (HCC): Primary | Chronic | ICD-10-CM

## 2018-11-23 DIAGNOSIS — I10 BENIGN ESSENTIAL HYPERTENSION: Chronic | ICD-10-CM

## 2018-11-23 LAB — SL AMB POCT HEMOGLOBIN AIC: 8.5

## 2018-11-23 PROCEDURE — 83036 HEMOGLOBIN GLYCOSYLATED A1C: CPT | Performed by: INTERNAL MEDICINE

## 2018-11-23 PROCEDURE — 99214 OFFICE O/P EST MOD 30 MIN: CPT | Performed by: INTERNAL MEDICINE

## 2018-11-23 PROCEDURE — 3045F PR MOST RECENT HEMOGLOBIN A1C LEVEL 7.0-9.0%: CPT | Performed by: INTERNAL MEDICINE

## 2018-11-23 PROCEDURE — 3066F NEPHROPATHY DOC TX: CPT | Performed by: INTERNAL MEDICINE
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
## 2018-11-24 NOTE — ASSESSMENT & PLAN NOTE
Lab Results   Component Value Date    HGBA1C 8 5 11/23/2018       No results for input(s): POCGLU in the last 72 hours  Blood Sugar Average: Last 72 hrs:  Suboptimal control I have counseled patient follow a healthy/balance diet reducing carbohydrates and sweets I will have the patient see Endocrinology  I would like the patient start checking the blood sugar 3 times a day and keeping a log 711 and 4 o'clock  I will order the diabetic eye examination I will be ordering diabetic laboratories including comprehensive metabolic panel, hemoglobin A1c, urine microalbumin, lipid panel

## 2018-11-24 NOTE — ASSESSMENT & PLAN NOTE
Patient will be seen Nephrology drink adequate amounts of water avoid anti-inflammatories will continue monitor

## 2018-11-24 NOTE — ASSESSMENT & PLAN NOTE
Currently stable doing well he sees cardiology routinely and has had a recent echo which is stable per the patient's daughter

## 2018-11-27 ENCOUNTER — OFFICE VISIT (OUTPATIENT)
Dept: NEPHROLOGY | Facility: CLINIC | Age: 73
End: 2018-11-27
Payer: COMMERCIAL

## 2018-11-27 VITALS
SYSTOLIC BLOOD PRESSURE: 126 MMHG | HEIGHT: 65 IN | DIASTOLIC BLOOD PRESSURE: 78 MMHG | BODY MASS INDEX: 25.69 KG/M2 | WEIGHT: 154.2 LBS | HEART RATE: 74 BPM

## 2018-11-27 DIAGNOSIS — E11.9 TYPE 2 DIABETES MELLITUS WITHOUT COMPLICATION, WITHOUT LONG-TERM CURRENT USE OF INSULIN (HCC): Chronic | ICD-10-CM

## 2018-11-27 DIAGNOSIS — N18.1 CHRONIC KIDNEY DISEASE (CKD) STAGE G1/A2, GLOMERULAR FILTRATION RATE (GFR) EQUAL TO OR GREATER THAN 90 ML/MIN/1.73 SQUARE METER AND ALBUMINURIA CREATININE RATIO BETWEEN 30-299 MG/G: Primary | ICD-10-CM

## 2018-11-27 DIAGNOSIS — E11.9 TYPE 2 DIABETES MELLITUS WITHOUT COMPLICATION, WITH LONG-TERM CURRENT USE OF INSULIN (HCC): ICD-10-CM

## 2018-11-27 DIAGNOSIS — N18.9 CHRONIC KIDNEY DISEASE, UNSPECIFIED CKD STAGE: ICD-10-CM

## 2018-11-27 DIAGNOSIS — I10 BENIGN ESSENTIAL HYPERTENSION: Chronic | ICD-10-CM

## 2018-11-27 DIAGNOSIS — Z79.4 TYPE 2 DIABETES MELLITUS WITHOUT COMPLICATION, WITH LONG-TERM CURRENT USE OF INSULIN (HCC): ICD-10-CM

## 2018-11-27 DIAGNOSIS — Z95.2 H/O AORTIC VALVE REPLACEMENT: ICD-10-CM

## 2018-11-27 LAB
SL AMB  POCT GLUCOSE, UA: NEGATIVE
SL AMB LEUKOCYTE ESTERASE,UA: NEGATIVE
SL AMB POCT BILIRUBIN,UA: NEGATIVE
SL AMB POCT BLOOD,UA: NEGATIVE
SL AMB POCT CLARITY,UA: ABNORMAL
SL AMB POCT COLOR,UA: ABNORMAL
SL AMB POCT KETONES,UA: NEGATIVE
SL AMB POCT NITRITE,UA: NEGATIVE
SL AMB POCT PH,UA: 5
SL AMB POCT SPECIFIC GRAVITY,UA: 1.03
SL AMB POCT URINE PROTEIN: 300
SL AMB POCT UROBILINOGEN: 0.2

## 2018-11-27 PROCEDURE — 4040F PNEUMOC VAC/ADMIN/RCVD: CPT | Performed by: INTERNAL MEDICINE

## 2018-11-27 PROCEDURE — 81002 URINALYSIS NONAUTO W/O SCOPE: CPT | Performed by: INTERNAL MEDICINE

## 2018-11-27 PROCEDURE — 99204 OFFICE O/P NEW MOD 45 MIN: CPT | Performed by: INTERNAL MEDICINE

## 2018-11-27 RX ORDER — BLOOD SUGAR DIAGNOSTIC
STRIP MISCELLANEOUS
Qty: 100 EACH | Refills: 2 | Status: SHIPPED | OUTPATIENT
Start: 2018-11-27 | End: 2019-02-21 | Stop reason: SDUPTHER
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
## 2018-11-27 NOTE — PATIENT INSTRUCTIONS
I want you to have blood and urine test as soon as possible, based on the results will decide about further recommendations  Okay to take Tylenol or paracetamol or acetaminophen as needed for pain  Please check your blood pressure at home at least 3 times a week for the next couple of weeks and keep a blood pressure log  Continue with a low-salt diet  We need to have better blood sugar control goal to have an A1c less than 7%  Avoid NSAIDs (no ibuprofen, Motrin, Advil, Aleve, naproxen)  I would like to see you back in the office in 1 year

## 2018-12-13 ENCOUNTER — TELEPHONE (OUTPATIENT)
Dept: NEUROLOGY | Facility: CLINIC | Age: 73
End: 2018-12-13

## 2018-12-13 ENCOUNTER — APPOINTMENT (OUTPATIENT)
Dept: LAB | Facility: HOSPITAL | Age: 73
End: 2018-12-13
Attending: PSYCHIATRY & NEUROLOGY
Payer: COMMERCIAL

## 2018-12-13 DIAGNOSIS — N18.1 CHRONIC KIDNEY DISEASE (CKD) STAGE G1/A2, GLOMERULAR FILTRATION RATE (GFR) EQUAL TO OR GREATER THAN 90 ML/MIN/1.73 SQUARE METER AND ALBUMINURIA CREATININE RATIO BETWEEN 30-299 MG/G: ICD-10-CM

## 2018-12-13 DIAGNOSIS — E11.9 TYPE 2 DIABETES MELLITUS WITHOUT COMPLICATION, WITHOUT LONG-TERM CURRENT USE OF INSULIN (HCC): Chronic | ICD-10-CM

## 2018-12-13 DIAGNOSIS — R42 DIZZINESS AND GIDDINESS: ICD-10-CM

## 2018-12-13 DIAGNOSIS — Z12.5 SCREENING PSA (PROSTATE SPECIFIC ANTIGEN): ICD-10-CM

## 2018-12-13 LAB
ALBUMIN SERPL BCP-MCNC: 4 G/DL (ref 3.5–5)
ALP SERPL-CCNC: 147 U/L (ref 46–116)
ALT SERPL W P-5'-P-CCNC: 46 U/L (ref 12–78)
ANION GAP SERPL CALCULATED.3IONS-SCNC: 4 MMOL/L (ref 4–13)
AST SERPL W P-5'-P-CCNC: 36 U/L (ref 5–45)
BACTERIA UR QL AUTO: ABNORMAL /HPF
BASOPHILS # BLD AUTO: 0.06 THOUSANDS/ΜL (ref 0–0.1)
BASOPHILS NFR BLD AUTO: 1 % (ref 0–1)
BILIRUB SERPL-MCNC: 0.7 MG/DL (ref 0.2–1)
BILIRUB UR QL STRIP: NEGATIVE
BUN SERPL-MCNC: 22 MG/DL (ref 5–25)
CALCIUM ALBUM COR SERPL-MCNC: 10.5 MG/DL (ref 8.3–10.1)
CALCIUM SERPL-MCNC: 10.5 MG/DL (ref 8.3–10.1)
CHLORIDE SERPL-SCNC: 104 MMOL/L (ref 100–108)
CHOLEST SERPL-MCNC: 129 MG/DL (ref 50–200)
CLARITY UR: CLEAR
CO2 SERPL-SCNC: 28 MMOL/L (ref 21–32)
COLOR UR: YELLOW
CREAT SERPL-MCNC: 1.07 MG/DL (ref 0.6–1.3)
CREAT UR-MCNC: 102 MG/DL
CREAT UR-MCNC: 107 MG/DL
EOSINOPHIL # BLD AUTO: 0.31 THOUSAND/ΜL (ref 0–0.61)
EOSINOPHIL NFR BLD AUTO: 4 % (ref 0–6)
ERYTHROCYTE [DISTWIDTH] IN BLOOD BY AUTOMATED COUNT: 14.2 % (ref 11.6–15.1)
GFR SERPL CREATININE-BSD FRML MDRD: 68 ML/MIN/1.73SQ M
GLUCOSE P FAST SERPL-MCNC: 134 MG/DL (ref 65–99)
GLUCOSE UR STRIP-MCNC: NEGATIVE MG/DL
HCT VFR BLD AUTO: 48.1 % (ref 36.5–49.3)
HDLC SERPL-MCNC: 37 MG/DL (ref 40–60)
HGB BLD-MCNC: 15 G/DL (ref 12–17)
HGB UR QL STRIP.AUTO: NEGATIVE
HYALINE CASTS #/AREA URNS LPF: ABNORMAL /LPF
IMM GRANULOCYTES # BLD AUTO: 0.02 THOUSAND/UL (ref 0–0.2)
IMM GRANULOCYTES NFR BLD AUTO: 0 % (ref 0–2)
KETONES UR STRIP-MCNC: NEGATIVE MG/DL
LDLC SERPL CALC-MCNC: 70 MG/DL (ref 0–100)
LEUKOCYTE ESTERASE UR QL STRIP: NEGATIVE
LYMPHOCYTES # BLD AUTO: 1.4 THOUSANDS/ΜL (ref 0.6–4.47)
LYMPHOCYTES NFR BLD AUTO: 16 % (ref 14–44)
MCH RBC QN AUTO: 27.1 PG (ref 26.8–34.3)
MCHC RBC AUTO-ENTMCNC: 31.2 G/DL (ref 31.4–37.4)
MCV RBC AUTO: 87 FL (ref 82–98)
MICROALBUMIN UR-MCNC: 508 MG/L (ref 0–20)
MICROALBUMIN/CREAT 24H UR: 475 MG/G CREATININE (ref 0–30)
MONOCYTES # BLD AUTO: 1 THOUSAND/ΜL (ref 0.17–1.22)
MONOCYTES NFR BLD AUTO: 12 % (ref 4–12)
NEUTROPHILS # BLD AUTO: 5.78 THOUSANDS/ΜL (ref 1.85–7.62)
NEUTS SEG NFR BLD AUTO: 67 % (ref 43–75)
NITRITE UR QL STRIP: NEGATIVE
NON-SQ EPI CELLS URNS QL MICRO: ABNORMAL /HPF
NRBC BLD AUTO-RTO: 0 /100 WBCS
PH UR STRIP.AUTO: 5.5 [PH] (ref 4.5–8)
PHOSPHATE SERPL-MCNC: 3.1 MG/DL (ref 2.3–4.1)
PLATELET # BLD AUTO: 209 THOUSANDS/UL (ref 149–390)
PMV BLD AUTO: 9.9 FL (ref 8.9–12.7)
POTASSIUM SERPL-SCNC: 5 MMOL/L (ref 3.5–5.3)
PROT SERPL-MCNC: 9.5 G/DL (ref 6.4–8.2)
PROT UR STRIP-MCNC: ABNORMAL MG/DL
PROT UR-MCNC: 66 MG/DL
PROT/CREAT UR: 0.65 MG/G{CREAT} (ref 0–0.1)
PSA SERPL-MCNC: 0.6 NG/ML (ref 0–4)
RBC # BLD AUTO: 5.54 MILLION/UL (ref 3.88–5.62)
RBC #/AREA URNS AUTO: ABNORMAL /HPF
SODIUM SERPL-SCNC: 136 MMOL/L (ref 136–145)
SP GR UR STRIP.AUTO: 1.02 (ref 1–1.03)
TRIGL SERPL-MCNC: 112 MG/DL
TSH SERPL DL<=0.05 MIU/L-ACNC: 1.87 UIU/ML (ref 0.36–3.74)
UROBILINOGEN UR QL STRIP.AUTO: 0.2 E.U./DL
WBC # BLD AUTO: 8.57 THOUSAND/UL (ref 4.31–10.16)
WBC #/AREA URNS AUTO: ABNORMAL /HPF

## 2018-12-13 PROCEDURE — 3062F POS MACROALBUMINURIA REV: CPT | Performed by: INTERNAL MEDICINE

## 2018-12-13 PROCEDURE — 36415 COLL VENOUS BLD VENIPUNCTURE: CPT

## 2018-12-13 PROCEDURE — 84100 ASSAY OF PHOSPHORUS: CPT

## 2018-12-13 PROCEDURE — 82570 ASSAY OF URINE CREATININE: CPT | Performed by: INTERNAL MEDICINE

## 2018-12-13 PROCEDURE — 84165 PROTEIN E-PHORESIS SERUM: CPT | Performed by: PATHOLOGY

## 2018-12-13 PROCEDURE — 84443 ASSAY THYROID STIM HORMONE: CPT

## 2018-12-13 PROCEDURE — 82570 ASSAY OF URINE CREATININE: CPT

## 2018-12-13 PROCEDURE — 84166 PROTEIN E-PHORESIS/URINE/CSF: CPT

## 2018-12-13 PROCEDURE — 82043 UR ALBUMIN QUANTITATIVE: CPT | Performed by: INTERNAL MEDICINE

## 2018-12-13 PROCEDURE — 84166 PROTEIN E-PHORESIS/URINE/CSF: CPT | Performed by: PATHOLOGY

## 2018-12-13 PROCEDURE — 84165 PROTEIN E-PHORESIS SERUM: CPT

## 2018-12-13 PROCEDURE — 80053 COMPREHEN METABOLIC PANEL: CPT

## 2018-12-13 PROCEDURE — 84156 ASSAY OF PROTEIN URINE: CPT

## 2018-12-13 PROCEDURE — 81001 URINALYSIS AUTO W/SCOPE: CPT

## 2018-12-13 PROCEDURE — G0103 PSA SCREENING: HCPCS

## 2018-12-13 PROCEDURE — 85025 COMPLETE CBC W/AUTO DIFF WBC: CPT

## 2018-12-13 PROCEDURE — 80061 LIPID PANEL: CPT

## 2018-12-13 NOTE — TELEPHONE ENCOUNTER
----- Message from Felicia Almonte MD sent at 12/13/2018 10:37 AM EST -----  Please review abnormal blood work with the patient, mildly elevated Calcium - he is to follow with PCP

## 2018-12-13 NOTE — TELEPHONE ENCOUNTER
Spoke with patient's daughter Jannie Simon and made her aware of below results and instruction per Dr Ronal Mills  I instructed that we will send lab results to PCP and requested that he follow up with him  I also instructed that if the patient has any further questions or concerns he should give our office a call back  Damaris verbalized clear understanding

## 2018-12-14 LAB
ALBUMIN SERPL ELPH-MCNC: 4.44 G/DL (ref 3.5–5)
ALBUMIN SERPL ELPH-MCNC: 49.3 % (ref 52–65)
ALBUMIN UR ELPH-MCNC: 78.3 %
ALPHA1 GLOB MFR UR ELPH: 4.6 %
ALPHA1 GLOB SERPL ELPH-MCNC: 0.38 G/DL (ref 0.1–0.4)
ALPHA1 GLOB SERPL ELPH-MCNC: 4.2 % (ref 2.5–5)
ALPHA2 GLOB MFR UR ELPH: 4.3 %
ALPHA2 GLOB SERPL ELPH-MCNC: 1.04 G/DL (ref 0.4–1.2)
ALPHA2 GLOB SERPL ELPH-MCNC: 11.6 % (ref 7–13)
B-GLOBULIN MFR UR ELPH: 7.3 %
BETA GLOB ABNORMAL SERPL ELPH-MCNC: 0.52 G/DL (ref 0.4–0.8)
BETA1 GLOB SERPL ELPH-MCNC: 5.8 % (ref 5–13)
BETA2 GLOB SERPL ELPH-MCNC: 5.9 % (ref 2–8)
BETA2+GAMMA GLOB SERPL ELPH-MCNC: 0.53 G/DL (ref 0.2–0.5)
GAMMA GLOB ABNORMAL SERPL ELPH-MCNC: 2.09 G/DL (ref 0.5–1.6)
GAMMA GLOB MFR UR ELPH: 5.5 %
GAMMA GLOB SERPL ELPH-MCNC: 23.2 % (ref 12–22)
IGG/ALB SER: 0.97 {RATIO} (ref 1.1–1.8)
PROT PATTERN UR ELPH-IMP: ABNORMAL
PROT SERPL-MCNC: 9 G/DL (ref 6.4–8.2)
PROT UR-MCNC: 67 MG/DL

## 2018-12-17 ENCOUNTER — TELEPHONE (OUTPATIENT)
Dept: NEPHROLOGY | Facility: CLINIC | Age: 73
End: 2018-12-17

## 2018-12-17 DIAGNOSIS — N18.2 STAGE 2 CHRONIC KIDNEY DISEASE: Primary | ICD-10-CM

## 2018-12-17 PROCEDURE — 4010F ACE/ARB THERAPY RXD/TAKEN: CPT | Performed by: INTERNAL MEDICINE

## 2018-12-17 RX ORDER — LOSARTAN POTASSIUM 25 MG/1
25 TABLET ORAL DAILY
Qty: 30 TABLET | Refills: 6 | Status: SHIPPED | OUTPATIENT
Start: 2018-12-17 | End: 2020-09-17 | Stop reason: SDUPTHER

## 2018-12-17 NOTE — TELEPHONE ENCOUNTER
Repeat blood test reviewed, renal function is stable, proteinuria around 0 6 g, urine and serum electrophoresis no monoclonal bands, noted mildly elevated serum calcium  I have called patient and left a message in both phone numbers  Please call patient, tell him that his kidney function is stable, tell him that his serum calcium is slightly elevated and please confirm with him that he should NOT be taking any calcium or vitamin-D supplements or multivitamins  I would like to restart losartan as he was previously taking (25 mg 1 tablet daily) and repeat blood test in 10 days after restart taking losartan (orders placed)    Thanks,

## 2018-12-18 NOTE — TELEPHONE ENCOUNTER
Spoke with patients daughter and informed her that patient shouldn't be taking Vit-D, calcium or multivitamin  She said she will make sure he isn't taking it and if he is she will make him stop taking them  Also she is aware of the prescription sent to the pharmacy and patient should go for blood work 10 days after starting Losartan

## 2018-12-24 DIAGNOSIS — Z79.4 TYPE 2 DIABETES MELLITUS WITHOUT COMPLICATION, WITH LONG-TERM CURRENT USE OF INSULIN (HCC): ICD-10-CM

## 2018-12-24 DIAGNOSIS — E11.9 TYPE 2 DIABETES MELLITUS WITHOUT COMPLICATION, WITH LONG-TERM CURRENT USE OF INSULIN (HCC): ICD-10-CM

## 2018-12-26 RX ORDER — LANCETS 33 GAUGE
EACH MISCELLANEOUS
Qty: 200 EACH | Refills: 2 | Status: SHIPPED | OUTPATIENT
Start: 2018-12-26 | End: 2022-03-08 | Stop reason: SDUPTHER

## 2019-01-23 DIAGNOSIS — E78.5 DYSLIPIDEMIA: ICD-10-CM

## 2019-01-24 RX ORDER — ATORVASTATIN CALCIUM 20 MG/1
20 TABLET, FILM COATED ORAL
Qty: 90 TABLET | Refills: 0 | Status: SHIPPED | OUTPATIENT
Start: 2019-01-24 | End: 2020-09-17 | Stop reason: SDUPTHER

## 2019-02-21 DIAGNOSIS — Z79.4 TYPE 2 DIABETES MELLITUS WITHOUT COMPLICATION, WITH LONG-TERM CURRENT USE OF INSULIN (HCC): ICD-10-CM

## 2019-02-21 DIAGNOSIS — E11.9 TYPE 2 DIABETES MELLITUS WITHOUT COMPLICATION, WITH LONG-TERM CURRENT USE OF INSULIN (HCC): ICD-10-CM

## 2019-02-21 RX ORDER — BLOOD SUGAR DIAGNOSTIC
STRIP MISCELLANEOUS
Qty: 100 EACH | Refills: 2 | Status: SHIPPED | OUTPATIENT
Start: 2019-02-21 | End: 2020-09-28 | Stop reason: SDUPTHER

## 2019-03-15 DIAGNOSIS — Z79.4 TYPE 2 DIABETES MELLITUS WITHOUT COMPLICATION, WITH LONG-TERM CURRENT USE OF INSULIN (HCC): ICD-10-CM

## 2019-03-15 DIAGNOSIS — E11.9 TYPE 2 DIABETES MELLITUS WITHOUT COMPLICATION, WITH LONG-TERM CURRENT USE OF INSULIN (HCC): ICD-10-CM

## 2019-09-05 ENCOUNTER — TELEPHONE (OUTPATIENT)
Dept: NEPHROLOGY | Facility: CLINIC | Age: 74
End: 2019-09-05

## 2019-09-05 NOTE — TELEPHONE ENCOUNTER
Called to schedule a f/u appt and spoke with daughter  She said that he is in [place] and she isn't sure if he will be back in a few months or if he is staying there  I asked her to give us a call back if [place]  She agreed

## 2020-09-17 DIAGNOSIS — N18.2 STAGE 2 CHRONIC KIDNEY DISEASE: ICD-10-CM

## 2020-09-17 DIAGNOSIS — E78.5 DYSLIPIDEMIA: ICD-10-CM

## 2020-09-17 DIAGNOSIS — E11.9 TYPE 2 DIABETES MELLITUS WITHOUT COMPLICATION, WITH LONG-TERM CURRENT USE OF INSULIN (HCC): ICD-10-CM

## 2020-09-17 DIAGNOSIS — Z79.4 TYPE 2 DIABETES MELLITUS WITHOUT COMPLICATION, WITH LONG-TERM CURRENT USE OF INSULIN (HCC): ICD-10-CM

## 2020-09-17 PROCEDURE — 3066F NEPHROPATHY DOC TX: CPT | Performed by: NURSE PRACTITIONER

## 2020-09-17 PROCEDURE — 4010F ACE/ARB THERAPY RXD/TAKEN: CPT | Performed by: NURSE PRACTITIONER

## 2020-09-17 RX ORDER — ATORVASTATIN CALCIUM 20 MG/1
TABLET, FILM COATED ORAL
Qty: 90 TABLET | Refills: 1 | Status: SHIPPED | OUTPATIENT
Start: 2020-09-17 | End: 2021-01-27 | Stop reason: SDUPTHER

## 2020-09-17 RX ORDER — INSULIN ASPART 100 [IU]/ML
INJECTION, SOLUTION INTRAVENOUS; SUBCUTANEOUS
Qty: 15 ML | Refills: 3 | Status: SHIPPED | OUTPATIENT
Start: 2020-09-17 | End: 2020-12-03 | Stop reason: CLARIF

## 2020-09-17 RX ORDER — LOSARTAN POTASSIUM 25 MG/1
25 TABLET ORAL DAILY
Qty: 30 TABLET | Refills: 5 | Status: SHIPPED | OUTPATIENT
Start: 2020-09-17 | End: 2020-09-17

## 2020-09-17 RX ORDER — INSULIN GLARGINE 100 [IU]/ML
INJECTION, SOLUTION SUBCUTANEOUS
Qty: 15 ML | Refills: 3 | Status: SHIPPED | OUTPATIENT
Start: 2020-09-17 | End: 2022-03-08 | Stop reason: SDUPTHER

## 2020-09-17 RX ORDER — ATORVASTATIN CALCIUM 20 MG/1
20 TABLET, FILM COATED ORAL
Qty: 30 TABLET | Refills: 5 | Status: SHIPPED | OUTPATIENT
Start: 2020-09-17 | End: 2020-09-17

## 2020-09-17 RX ORDER — LOSARTAN POTASSIUM 25 MG/1
TABLET ORAL
Qty: 90 TABLET | Refills: 1 | Status: SHIPPED | OUTPATIENT
Start: 2020-09-17 | End: 2021-01-27 | Stop reason: SDUPTHER

## 2020-09-28 DIAGNOSIS — E11.9 TYPE 2 DIABETES MELLITUS WITHOUT COMPLICATION, WITH LONG-TERM CURRENT USE OF INSULIN (HCC): ICD-10-CM

## 2020-09-28 DIAGNOSIS — Z79.4 TYPE 2 DIABETES MELLITUS WITHOUT COMPLICATION, WITH LONG-TERM CURRENT USE OF INSULIN (HCC): ICD-10-CM

## 2020-09-28 RX ORDER — BLOOD SUGAR DIAGNOSTIC
1 STRIP MISCELLANEOUS 3 TIMES DAILY
Qty: 100 EACH | Refills: 5 | Status: SHIPPED | OUTPATIENT
Start: 2020-09-28 | End: 2020-12-23 | Stop reason: SDUPTHER

## 2020-09-28 RX ORDER — BLOOD-GLUCOSE METER
EACH MISCELLANEOUS 3 TIMES DAILY
Qty: 1 KIT | Refills: 0 | Status: SHIPPED | OUTPATIENT
Start: 2020-09-28 | End: 2021-01-27 | Stop reason: ALTCHOICE

## 2020-09-28 RX ORDER — LANCETS
EACH MISCELLANEOUS 3 TIMES DAILY
Qty: 100 EACH | Refills: 5 | Status: SHIPPED | OUTPATIENT
Start: 2020-09-28 | End: 2020-10-07

## 2020-10-07 ENCOUNTER — OFFICE VISIT (OUTPATIENT)
Dept: INTERNAL MEDICINE CLINIC | Facility: CLINIC | Age: 75
End: 2020-10-07
Payer: COMMERCIAL

## 2020-10-07 DIAGNOSIS — M54.50 CHRONIC BILATERAL LOW BACK PAIN WITHOUT SCIATICA: ICD-10-CM

## 2020-10-07 DIAGNOSIS — Z00.00 HEALTHCARE MAINTENANCE: Primary | ICD-10-CM

## 2020-10-07 DIAGNOSIS — Z12.5 SCREENING PSA (PROSTATE SPECIFIC ANTIGEN): ICD-10-CM

## 2020-10-07 DIAGNOSIS — R42 DIZZINESS AND GIDDINESS: ICD-10-CM

## 2020-10-07 DIAGNOSIS — G91.2 NORMAL PRESSURE HYDROCEPHALUS (HCC): ICD-10-CM

## 2020-10-07 DIAGNOSIS — N18.1 CHRONIC KIDNEY DISEASE (CKD) STAGE G1/A2, GLOMERULAR FILTRATION RATE (GFR) EQUAL TO OR GREATER THAN 90 ML/MIN/1.73 SQUARE METER AND ALBUMINURIA CREATININE RATIO BETWEEN 30-299 MG/G: ICD-10-CM

## 2020-10-07 DIAGNOSIS — H91.13 PRESBYCUSIS OF BOTH EARS: ICD-10-CM

## 2020-10-07 DIAGNOSIS — E11.9 TYPE 2 DIABETES MELLITUS WITHOUT COMPLICATION, WITHOUT LONG-TERM CURRENT USE OF INSULIN (HCC): Chronic | ICD-10-CM

## 2020-10-07 DIAGNOSIS — T82.897D AORTIC PROSTHETIC VALVE REGURGITATION, SUBSEQUENT ENCOUNTER: ICD-10-CM

## 2020-10-07 DIAGNOSIS — Z79.4 TYPE 2 DIABETES MELLITUS WITHOUT COMPLICATION, WITH LONG-TERM CURRENT USE OF INSULIN (HCC): ICD-10-CM

## 2020-10-07 DIAGNOSIS — I35.0 NONRHEUMATIC AORTIC VALVE STENOSIS: ICD-10-CM

## 2020-10-07 DIAGNOSIS — G89.29 CHRONIC PAIN OF BOTH KNEES: ICD-10-CM

## 2020-10-07 DIAGNOSIS — G89.29 CHRONIC BILATERAL LOW BACK PAIN WITHOUT SCIATICA: ICD-10-CM

## 2020-10-07 DIAGNOSIS — M25.562 CHRONIC PAIN OF BOTH KNEES: ICD-10-CM

## 2020-10-07 DIAGNOSIS — M25.561 CHRONIC PAIN OF BOTH KNEES: ICD-10-CM

## 2020-10-07 DIAGNOSIS — E11.9 TYPE 2 DIABETES MELLITUS WITHOUT COMPLICATION, WITH LONG-TERM CURRENT USE OF INSULIN (HCC): ICD-10-CM

## 2020-10-07 DIAGNOSIS — K21.9 GASTROESOPHAGEAL REFLUX DISEASE WITHOUT ESOPHAGITIS: ICD-10-CM

## 2020-10-07 PROCEDURE — 83036 HEMOGLOBIN GLYCOSYLATED A1C: CPT | Performed by: NURSE PRACTITIONER

## 2020-10-07 PROCEDURE — 99213 OFFICE O/P EST LOW 20 MIN: CPT | Performed by: NURSE PRACTITIONER

## 2020-10-07 PROCEDURE — 3074F SYST BP LT 130 MM HG: CPT | Performed by: NURSE PRACTITIONER

## 2020-10-07 PROCEDURE — 3079F DIAST BP 80-89 MM HG: CPT | Performed by: NURSE PRACTITIONER

## 2020-10-07 PROCEDURE — 1170F FXNL STATUS ASSESSED: CPT | Performed by: NURSE PRACTITIONER

## 2020-10-07 PROCEDURE — 1101F PT FALLS ASSESS-DOCD LE1/YR: CPT | Performed by: NURSE PRACTITIONER

## 2020-10-07 PROCEDURE — 1125F AMNT PAIN NOTED PAIN PRSNT: CPT | Performed by: NURSE PRACTITIONER

## 2020-10-07 PROCEDURE — 3288F FALL RISK ASSESSMENT DOCD: CPT | Performed by: NURSE PRACTITIONER

## 2020-10-07 PROCEDURE — G0439 PPPS, SUBSEQ VISIT: HCPCS | Performed by: NURSE PRACTITIONER

## 2020-10-07 PROCEDURE — 3725F SCREEN DEPRESSION PERFORMED: CPT | Performed by: NURSE PRACTITIONER

## 2020-10-07 RX ORDER — LANCETS
EACH MISCELLANEOUS
Qty: 204 EACH | Refills: 2 | Status: SHIPPED | OUTPATIENT
Start: 2020-10-07 | End: 2022-03-08 | Stop reason: SDUPTHER

## 2020-10-09 VITALS
BODY MASS INDEX: 25.66 KG/M2 | HEIGHT: 65 IN | SYSTOLIC BLOOD PRESSURE: 120 MMHG | DIASTOLIC BLOOD PRESSURE: 80 MMHG | WEIGHT: 154 LBS | TEMPERATURE: 98 F

## 2020-10-09 PROBLEM — G89.29 CHRONIC BILATERAL LOW BACK PAIN WITHOUT SCIATICA: Status: ACTIVE | Noted: 2020-10-09

## 2020-10-09 PROBLEM — M54.50 CHRONIC BILATERAL LOW BACK PAIN WITHOUT SCIATICA: Status: ACTIVE | Noted: 2020-10-09

## 2020-10-09 PROBLEM — M25.562 CHRONIC PAIN OF BOTH KNEES: Status: ACTIVE | Noted: 2020-10-09

## 2020-10-09 PROBLEM — M25.561 CHRONIC PAIN OF BOTH KNEES: Status: ACTIVE | Noted: 2020-10-09

## 2020-10-09 PROBLEM — H91.13 PRESBYCUSIS OF BOTH EARS: Status: ACTIVE | Noted: 2020-10-09

## 2020-10-09 PROBLEM — G89.29 CHRONIC PAIN OF BOTH KNEES: Status: ACTIVE | Noted: 2020-10-09

## 2020-10-12 LAB — SL AMB POCT HEMOGLOBIN AIC: 7.9 (ref ?–6.5)

## 2020-10-12 PROCEDURE — 3051F HG A1C>EQUAL 7.0%<8.0%: CPT | Performed by: NURSE PRACTITIONER

## 2020-10-19 ENCOUNTER — LAB (OUTPATIENT)
Dept: LAB | Facility: HOSPITAL | Age: 75
End: 2020-10-19
Payer: COMMERCIAL

## 2020-10-19 ENCOUNTER — HOSPITAL ENCOUNTER (OUTPATIENT)
Dept: RADIOLOGY | Facility: HOSPITAL | Age: 75
Discharge: HOME | End: 2020-10-19
Payer: COMMERCIAL

## 2020-10-19 ENCOUNTER — TRANSCRIBE ORDERS (OUTPATIENT)
Dept: RADIOLOGY | Facility: HOSPITAL | Age: 75
End: 2020-10-19

## 2020-10-19 DIAGNOSIS — E11.9 TYPE 2 DIABETES MELLITUS WITHOUT COMPLICATION, WITHOUT LONG-TERM CURRENT USE OF INSULIN (HCC): Chronic | ICD-10-CM

## 2020-10-19 DIAGNOSIS — M25.562 CHRONIC PAIN OF BOTH KNEES: ICD-10-CM

## 2020-10-19 DIAGNOSIS — Z12.5 SCREENING PSA (PROSTATE SPECIFIC ANTIGEN): ICD-10-CM

## 2020-10-19 DIAGNOSIS — M54.50 CHRONIC BILATERAL LOW BACK PAIN WITHOUT SCIATICA: ICD-10-CM

## 2020-10-19 DIAGNOSIS — M25.561 CHRONIC PAIN OF BOTH KNEES: ICD-10-CM

## 2020-10-19 DIAGNOSIS — G89.29 CHRONIC PAIN OF BOTH KNEES: ICD-10-CM

## 2020-10-19 DIAGNOSIS — G89.29 CHRONIC BILATERAL LOW BACK PAIN WITHOUT SCIATICA: ICD-10-CM

## 2020-10-19 LAB
ALBUMIN SERPL BCP-MCNC: 4 G/DL (ref 3.5–5)
ALP SERPL-CCNC: 100 U/L (ref 46–116)
ALT SERPL W P-5'-P-CCNC: 32 U/L (ref 12–78)
ANION GAP SERPL CALCULATED.3IONS-SCNC: 2 MMOL/L (ref 4–13)
AST SERPL W P-5'-P-CCNC: 27 U/L (ref 5–45)
BASOPHILS # BLD AUTO: 0.04 THOUSANDS/ΜL (ref 0–0.1)
BASOPHILS NFR BLD AUTO: 1 % (ref 0–1)
BILIRUB SERPL-MCNC: 0.69 MG/DL (ref 0.2–1)
BUN SERPL-MCNC: 19 MG/DL (ref 5–25)
CALCIUM SERPL-MCNC: 10.1 MG/DL (ref 8.3–10.1)
CHLORIDE SERPL-SCNC: 108 MMOL/L (ref 100–108)
CHOLEST SERPL-MCNC: 118 MG/DL (ref 50–200)
CO2 SERPL-SCNC: 31 MMOL/L (ref 21–32)
CREAT SERPL-MCNC: 1.05 MG/DL (ref 0.6–1.3)
EOSINOPHIL # BLD AUTO: 0.28 THOUSAND/ΜL (ref 0–0.61)
EOSINOPHIL NFR BLD AUTO: 4 % (ref 0–6)
ERYTHROCYTE [DISTWIDTH] IN BLOOD BY AUTOMATED COUNT: 14 % (ref 11.6–15.1)
GFR SERPL CREATININE-BSD FRML MDRD: 69 ML/MIN/1.73SQ M
GLUCOSE P FAST SERPL-MCNC: 130 MG/DL (ref 65–99)
HCT VFR BLD AUTO: 45.4 % (ref 36.5–49.3)
HDLC SERPL-MCNC: 45 MG/DL
HGB BLD-MCNC: 14.4 G/DL (ref 12–17)
IMM GRANULOCYTES # BLD AUTO: 0.03 THOUSAND/UL (ref 0–0.2)
IMM GRANULOCYTES NFR BLD AUTO: 0 % (ref 0–2)
LDLC SERPL CALC-MCNC: 54 MG/DL (ref 0–100)
LYMPHOCYTES # BLD AUTO: 1.17 THOUSANDS/ΜL (ref 0.6–4.47)
LYMPHOCYTES NFR BLD AUTO: 15 % (ref 14–44)
MCH RBC QN AUTO: 27.5 PG (ref 26.8–34.3)
MCHC RBC AUTO-ENTMCNC: 31.7 G/DL (ref 31.4–37.4)
MCV RBC AUTO: 87 FL (ref 82–98)
MONOCYTES # BLD AUTO: 0.93 THOUSAND/ΜL (ref 0.17–1.22)
MONOCYTES NFR BLD AUTO: 12 % (ref 4–12)
NEUTROPHILS # BLD AUTO: 5.28 THOUSANDS/ΜL (ref 1.85–7.62)
NEUTS SEG NFR BLD AUTO: 68 % (ref 43–75)
NONHDLC SERPL-MCNC: 73 MG/DL
NRBC BLD AUTO-RTO: 0 /100 WBCS
PLATELET # BLD AUTO: 184 THOUSANDS/UL (ref 149–390)
PMV BLD AUTO: 10 FL (ref 8.9–12.7)
POTASSIUM SERPL-SCNC: 5.1 MMOL/L (ref 3.5–5.3)
PROT SERPL-MCNC: 9 G/DL (ref 6.4–8.2)
RBC # BLD AUTO: 5.24 MILLION/UL (ref 3.88–5.62)
SODIUM SERPL-SCNC: 141 MMOL/L (ref 136–145)
TRIGL SERPL-MCNC: 93 MG/DL
WBC # BLD AUTO: 7.73 THOUSAND/UL (ref 4.31–10.16)

## 2020-10-19 PROCEDURE — 73562 X-RAY EXAM OF KNEE 3: CPT

## 2020-10-19 PROCEDURE — 72110 X-RAY EXAM L-2 SPINE 4/>VWS: CPT

## 2020-10-19 PROCEDURE — 80053 COMPREHEN METABOLIC PANEL: CPT

## 2020-10-19 PROCEDURE — 84154 ASSAY OF PSA FREE: CPT

## 2020-10-19 PROCEDURE — 84153 ASSAY OF PSA TOTAL: CPT

## 2020-10-19 PROCEDURE — 72072 X-RAY EXAM THORAC SPINE 3VWS: CPT

## 2020-10-19 PROCEDURE — 80061 LIPID PANEL: CPT

## 2020-10-19 PROCEDURE — 85025 COMPLETE CBC W/AUTO DIFF WBC: CPT

## 2020-10-19 PROCEDURE — 36415 COLL VENOUS BLD VENIPUNCTURE: CPT

## 2020-10-20 LAB
PSA FREE MFR SERPL: 40 %
PSA FREE SERPL-MCNC: 0.2 NG/ML
PSA SERPL-MCNC: 0.5 NG/ML (ref 0–4)

## 2020-10-29 ENCOUNTER — OFFICE VISIT (OUTPATIENT)
Dept: AUDIOLOGY | Age: 75
End: 2020-10-29
Payer: COMMERCIAL

## 2020-10-29 DIAGNOSIS — H90.3 SENSORINEURAL HEARING LOSS, BILATERAL: Primary | ICD-10-CM

## 2020-10-29 PROCEDURE — 92567 TYMPANOMETRY: CPT | Performed by: AUDIOLOGIST

## 2020-10-29 PROCEDURE — 92557 COMPREHENSIVE HEARING TEST: CPT | Performed by: AUDIOLOGIST

## 2020-11-02 LAB
LEFT EYE DIABETIC RETINOPATHY: NORMAL
RIGHT EYE DIABETIC RETINOPATHY: NORMAL

## 2020-12-03 ENCOUNTER — TELEPHONE (OUTPATIENT)
Dept: INTERNAL MEDICINE CLINIC | Facility: CLINIC | Age: 75
End: 2020-12-03

## 2020-12-03 DIAGNOSIS — E11.9 TYPE 2 DIABETES MELLITUS WITHOUT COMPLICATION, WITHOUT LONG-TERM CURRENT USE OF INSULIN (HCC): Primary | Chronic | ICD-10-CM

## 2020-12-03 RX ORDER — CALCIUM CARB/VITAMIN D3/VIT K1 500-100-40
TABLET,CHEWABLE ORAL 3 TIMES DAILY
Qty: 300 EACH | Refills: 1 | Status: SHIPPED | OUTPATIENT
Start: 2020-12-03 | End: 2021-01-27 | Stop reason: ALTCHOICE

## 2020-12-07 ENCOUNTER — TELEPHONE (OUTPATIENT)
Dept: INTERNAL MEDICINE CLINIC | Facility: CLINIC | Age: 75
End: 2020-12-07

## 2020-12-07 DIAGNOSIS — E11.9 TYPE 2 DIABETES MELLITUS WITHOUT COMPLICATION, WITHOUT LONG-TERM CURRENT USE OF INSULIN (HCC): Chronic | ICD-10-CM

## 2020-12-07 RX ORDER — INSULIN ASPART 100 [IU]/ML
INJECTION, SOLUTION INTRAVENOUS; SUBCUTANEOUS
Qty: 10 PEN | Refills: 1 | Status: SHIPPED | OUTPATIENT
Start: 2020-12-07 | End: 2021-03-18 | Stop reason: ALTCHOICE

## 2020-12-14 ENCOUNTER — OFFICE VISIT (OUTPATIENT)
Dept: UROLOGY | Facility: AMBULATORY SURGERY CENTER | Age: 75
End: 2020-12-14
Payer: COMMERCIAL

## 2020-12-14 VITALS
HEART RATE: 70 BPM | WEIGHT: 155 LBS | BODY MASS INDEX: 25.83 KG/M2 | SYSTOLIC BLOOD PRESSURE: 120 MMHG | DIASTOLIC BLOOD PRESSURE: 68 MMHG | HEIGHT: 65 IN

## 2020-12-14 DIAGNOSIS — Z12.5 SCREENING PSA (PROSTATE SPECIFIC ANTIGEN): ICD-10-CM

## 2020-12-14 LAB
SL AMB  POCT GLUCOSE, UA: NORMAL
SL AMB LEUKOCYTE ESTERASE,UA: NORMAL
SL AMB POCT BILIRUBIN,UA: NORMAL
SL AMB POCT BLOOD,UA: NORMAL
SL AMB POCT CLARITY,UA: CLEAR
SL AMB POCT COLOR,UA: YELLOW
SL AMB POCT KETONES,UA: NORMAL
SL AMB POCT NITRITE,UA: NORMAL
SL AMB POCT PH,UA: 6
SL AMB POCT SPECIFIC GRAVITY,UA: 1.02
SL AMB POCT URINE PROTEIN: NORMAL
SL AMB POCT UROBILINOGEN: 0.2

## 2020-12-14 PROCEDURE — 3061F NEG MICROALBUMINURIA REV: CPT | Performed by: NURSE PRACTITIONER

## 2020-12-14 PROCEDURE — 3078F DIAST BP <80 MM HG: CPT | Performed by: NURSE PRACTITIONER

## 2020-12-14 PROCEDURE — 81002 URINALYSIS NONAUTO W/O SCOPE: CPT | Performed by: NURSE PRACTITIONER

## 2020-12-14 PROCEDURE — 99204 OFFICE O/P NEW MOD 45 MIN: CPT | Performed by: NURSE PRACTITIONER

## 2020-12-14 PROCEDURE — 1036F TOBACCO NON-USER: CPT | Performed by: NURSE PRACTITIONER

## 2020-12-14 PROCEDURE — 3074F SYST BP LT 130 MM HG: CPT | Performed by: NURSE PRACTITIONER

## 2020-12-14 PROCEDURE — 1160F RVW MEDS BY RX/DR IN RCRD: CPT | Performed by: NURSE PRACTITIONER

## 2020-12-23 ENCOUNTER — CONSULT (OUTPATIENT)
Dept: ENDOCRINOLOGY | Facility: CLINIC | Age: 75
End: 2020-12-23
Payer: COMMERCIAL

## 2020-12-23 VITALS
BODY MASS INDEX: 26.03 KG/M2 | SYSTOLIC BLOOD PRESSURE: 122 MMHG | HEART RATE: 68 BPM | HEIGHT: 65 IN | DIASTOLIC BLOOD PRESSURE: 60 MMHG | WEIGHT: 156.25 LBS

## 2020-12-23 DIAGNOSIS — E11.9 TYPE 2 DIABETES MELLITUS WITHOUT COMPLICATION, WITH LONG-TERM CURRENT USE OF INSULIN (HCC): ICD-10-CM

## 2020-12-23 DIAGNOSIS — E78.2 MIXED HYPERLIPIDEMIA: ICD-10-CM

## 2020-12-23 DIAGNOSIS — I10 ESSENTIAL HYPERTENSION: ICD-10-CM

## 2020-12-23 DIAGNOSIS — N18.1 CHRONIC KIDNEY DISEASE (CKD) STAGE G1/A2, GLOMERULAR FILTRATION RATE (GFR) EQUAL TO OR GREATER THAN 90 ML/MIN/1.73 SQUARE METER AND ALBUMINURIA CREATININE RATIO BETWEEN 30-299 MG/G: ICD-10-CM

## 2020-12-23 DIAGNOSIS — Z79.4 TYPE 2 DIABETES MELLITUS WITHOUT COMPLICATION, WITH LONG-TERM CURRENT USE OF INSULIN (HCC): ICD-10-CM

## 2020-12-23 DIAGNOSIS — E11.9 TYPE 2 DIABETES MELLITUS WITHOUT COMPLICATION, WITHOUT LONG-TERM CURRENT USE OF INSULIN (HCC): Primary | Chronic | ICD-10-CM

## 2020-12-23 PROCEDURE — 3078F DIAST BP <80 MM HG: CPT | Performed by: INTERNAL MEDICINE

## 2020-12-23 PROCEDURE — 3074F SYST BP LT 130 MM HG: CPT | Performed by: INTERNAL MEDICINE

## 2020-12-23 PROCEDURE — 3066F NEPHROPATHY DOC TX: CPT | Performed by: INTERNAL MEDICINE

## 2020-12-23 PROCEDURE — 1036F TOBACCO NON-USER: CPT | Performed by: INTERNAL MEDICINE

## 2020-12-23 PROCEDURE — 99204 OFFICE O/P NEW MOD 45 MIN: CPT | Performed by: INTERNAL MEDICINE

## 2020-12-23 RX ORDER — MULTIVIT WITH MINERALS/LUTEIN
250 TABLET ORAL DAILY
COMMUNITY
End: 2022-06-27 | Stop reason: ALTCHOICE

## 2020-12-23 RX ORDER — BLOOD SUGAR DIAGNOSTIC
STRIP MISCELLANEOUS
Qty: 400 EACH | Refills: 5 | Status: SHIPPED | OUTPATIENT
Start: 2020-12-23 | End: 2021-01-27 | Stop reason: ALTCHOICE

## 2021-01-07 ENCOUNTER — TELEPHONE (OUTPATIENT)
Dept: NEUROLOGY | Facility: CLINIC | Age: 76
End: 2021-01-07

## 2021-01-07 NOTE — TELEPHONE ENCOUNTER
Called patient LVM for patient to call back to reschedule appointment from today due to provider out of office  If patient calls back please reschedule patient thanks

## 2021-01-27 ENCOUNTER — OFFICE VISIT (OUTPATIENT)
Dept: INTERNAL MEDICINE CLINIC | Facility: CLINIC | Age: 76
End: 2021-01-27
Payer: COMMERCIAL

## 2021-01-27 VITALS
HEART RATE: 66 BPM | OXYGEN SATURATION: 97 % | DIASTOLIC BLOOD PRESSURE: 56 MMHG | WEIGHT: 154 LBS | SYSTOLIC BLOOD PRESSURE: 122 MMHG | BODY MASS INDEX: 25.66 KG/M2 | RESPIRATION RATE: 16 BRPM | HEIGHT: 65 IN

## 2021-01-27 DIAGNOSIS — Z23 NEED FOR VACCINATION: ICD-10-CM

## 2021-01-27 DIAGNOSIS — G89.29 CHRONIC BILATERAL LOW BACK PAIN WITHOUT SCIATICA: ICD-10-CM

## 2021-01-27 DIAGNOSIS — Z12.11 SCREEN FOR COLON CANCER: ICD-10-CM

## 2021-01-27 DIAGNOSIS — E11.9 TYPE 2 DIABETES MELLITUS WITHOUT COMPLICATION, WITHOUT LONG-TERM CURRENT USE OF INSULIN (HCC): Primary | Chronic | ICD-10-CM

## 2021-01-27 DIAGNOSIS — M54.50 CHRONIC BILATERAL LOW BACK PAIN WITHOUT SCIATICA: ICD-10-CM

## 2021-01-27 DIAGNOSIS — E78.5 DYSLIPIDEMIA: ICD-10-CM

## 2021-01-27 DIAGNOSIS — N18.2 STAGE 2 CHRONIC KIDNEY DISEASE: ICD-10-CM

## 2021-01-27 DIAGNOSIS — I35.0 AORTIC VALVE STENOSIS, ETIOLOGY OF CARDIAC VALVE DISEASE UNSPECIFIED: ICD-10-CM

## 2021-01-27 DIAGNOSIS — Z13.6 SCREENING FOR CARDIOVASCULAR CONDITION: ICD-10-CM

## 2021-01-27 LAB — SL AMB POCT HEMOGLOBIN AIC: 7.8 (ref ?–6.5)

## 2021-01-27 PROCEDURE — G0008 ADMIN INFLUENZA VIRUS VAC: HCPCS

## 2021-01-27 PROCEDURE — 99214 OFFICE O/P EST MOD 30 MIN: CPT | Performed by: INTERNAL MEDICINE

## 2021-01-27 PROCEDURE — 3078F DIAST BP <80 MM HG: CPT | Performed by: INTERNAL MEDICINE

## 2021-01-27 PROCEDURE — 83036 HEMOGLOBIN GLYCOSYLATED A1C: CPT | Performed by: INTERNAL MEDICINE

## 2021-01-27 PROCEDURE — 90662 IIV NO PRSV INCREASED AG IM: CPT

## 2021-01-27 PROCEDURE — 1160F RVW MEDS BY RX/DR IN RCRD: CPT | Performed by: INTERNAL MEDICINE

## 2021-01-27 PROCEDURE — 3074F SYST BP LT 130 MM HG: CPT | Performed by: INTERNAL MEDICINE

## 2021-01-27 PROCEDURE — 1036F TOBACCO NON-USER: CPT | Performed by: INTERNAL MEDICINE

## 2021-01-27 RX ORDER — AMOXICILLIN 500 MG/1
CAPSULE ORAL
COMMUNITY
Start: 2021-01-18 | End: 2022-03-08 | Stop reason: ALTCHOICE

## 2021-01-27 RX ORDER — BLOOD SUGAR DIAGNOSTIC
1 STRIP MISCELLANEOUS 4 TIMES DAILY
Qty: 200 EACH | Refills: 2
Start: 2021-01-27 | End: 2022-03-08 | Stop reason: SDUPTHER

## 2021-01-27 RX ORDER — LOSARTAN POTASSIUM 25 MG/1
25 TABLET ORAL DAILY
Qty: 90 TABLET | Refills: 1 | Status: SHIPPED | OUTPATIENT
Start: 2021-01-27 | End: 2022-03-08 | Stop reason: SDUPTHER

## 2021-01-27 RX ORDER — ATORVASTATIN CALCIUM 20 MG/1
20 TABLET, FILM COATED ORAL
Qty: 90 TABLET | Refills: 1 | Status: SHIPPED | OUTPATIENT
Start: 2021-01-27 | End: 2021-12-21

## 2021-01-27 RX ORDER — BLOOD SUGAR DIAGNOSTIC
1 STRIP MISCELLANEOUS 4 TIMES DAILY
COMMUNITY
End: 2021-01-27 | Stop reason: SDUPTHER
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
## 2021-01-27 NOTE — PATIENT INSTRUCTIONS
Insomnia   WHAT YOU NEED TO KNOW:   Insomnia is a condition that makes it hard to fall or stay asleep  Lack of sleep can lead to attention or memory problems during the day  You may also be kuhn, depressed, clumsy, or have headaches  DISCHARGE INSTRUCTIONS:   Contact your healthcare provider if:   · Your symptoms do not get better, or they get worse  · You begin to use drugs or alcohol to fall asleep  · You have questions or concerns about your condition or care  Medicines:   · Medicines  may help you sleep more regularly or help you feel less anxious  · Take your medicine as directed  Contact your healthcare provider if you think your medicine is not helping or if you have side effects  Tell him or her if you are allergic to any medicine  Keep a list of the medicines, vitamins, and herbs you take  Include the amounts, and when and why you take them  Bring the list or the pill bottles to follow-up visits  Carry your medicine list with you in case of an emergency  What you can do to improve your sleep:   · Create a sleep schedule  Try to go to sleep and wake up at the same times every day  Keep a record of your sleep patterns, and any sleeping problems you have  Bring the record to follow-up visits with healthcare providers  · Do not take naps  Naps could make it hard for you to fall asleep at bedtime  · Keep your bedroom cool, quiet, and dark  Turn on white noise, such as a fan, to help you relax  Do not use your bed for any activity that will keep you awake  Do not read, exercise, eat, or watch TV in your bedroom  · Get up if you do not fall asleep within 20 minutes  Move to another room and do something relaxing until you become sleepy  · Limit caffeine, alcohol, and food to earlier in the day  Only drink caffeine in the morning  Do not drink alcohol within 6 hours of bedtime  Do not eat a heavy meal right before you go to bed  · Exercise regularly    Daily exercise may help you sleep better  Do not exercise within 4 hours of bedtime  Follow up with your healthcare provider as directed: Your healthcare provider may refer you for cognitive behavioral therapy  A behavioral therapist may help you find ways to relax, decrease stress, and improve sleep  Write down your questions so you remember to ask them during your visits  © Copyright [address] Information is for End User's use only and may not be sold, redistributed or otherwise used for commercial purposes  All illustrations and images included in CareNotes® are the copyrighted property of [employer]  or [address] [place], [place] [address]heather alicia   The above information is an  only  It is not intended as medical advice for individual conditions or treatments  Talk to your doctor, nurse or pharmacist before following any medical regimen to see if it is safe and effective for you

## 2021-01-28 DIAGNOSIS — B35.3 TINEA PEDIS, UNSPECIFIED LATERALITY: Primary | ICD-10-CM

## 2021-01-28 NOTE — ASSESSMENT & PLAN NOTE
Lab Results   Component Value Date    HGBA1C 7 8 (A) 01/27/2021   I have counselled the pt to follow a healthy and balanced diet ,and recommend routine exercise  I will be ordering diabetic laboratories including comprehensive metabolic panel, hemoglobin A1c, urine microalbumin, lipid panel    Eye examination recommended

## 2021-01-29 DIAGNOSIS — B35.3 TINEA PEDIS, UNSPECIFIED LATERALITY: Primary | ICD-10-CM

## 2021-01-29 RX ORDER — KETOCONAZOLE 20 MG/G
CREAM TOPICAL DAILY
Qty: 15 G | Refills: 0 | Status: SHIPPED | OUTPATIENT
Start: 2021-01-29 | End: 2022-03-08 | Stop reason: ALTCHOICE

## 2021-03-16 ENCOUNTER — TELEPHONE (OUTPATIENT)
Dept: INTERNAL MEDICINE CLINIC | Facility: CLINIC | Age: 76
End: 2021-03-16

## 2021-03-16 NOTE — TELEPHONE ENCOUNTER
Patient said his [employer] is not covering his insulin  He asked if you are aware of another insurance that would cover his insulin? He is on   insulin aspart (NovoLOG FlexPen) 100 UNIT/ML injection pen    insulin glargine (Lantus SoloStar) 100 units/mL injection pen    Or he asked if we would contact his [employer] to see what they needed in order to cover the insulin for him?

## 2021-03-17 NOTE — TELEPHONE ENCOUNTER
I received paper work today from [employer] to be completed regarding the Insulin Aspart  The covered formulary is Novolog U-100 "[facility]"[place]) which they note is identical to the Insulin Aspart  Please advise if BRAND would be ok

## 2021-03-18 DIAGNOSIS — E11.9 TYPE 2 DIABETES MELLITUS WITHOUT COMPLICATION, WITHOUT LONG-TERM CURRENT USE OF INSULIN (HCC): Primary | Chronic | ICD-10-CM

## 2021-03-18 RX ORDER — INSULIN ASPART 100 [IU]/ML
INJECTION, SOLUTION INTRAVENOUS; SUBCUTANEOUS
Qty: 30 ML | Refills: 1 | Status: SHIPPED | OUTPATIENT
Start: 2021-03-18 | End: 2021-04-05 | Stop reason: SDUPTHER

## 2021-03-18 NOTE — TELEPHONE ENCOUNTER
Wilda Aj would you check on this it was also listed as a med not covered       insulin glargine (Lantus SoloStar) 100 units/mL injection pen

## 2021-03-19 NOTE — TELEPHONE ENCOUNTER
Appears Lantus is one of the preferred long-acting insulins for patient's plan  Possibility that patient has not yet met medication deductible for the year and will need to meet this deductible to have rx cost reduced  If patient has questions about his medication deductible, would recommend having patient contact insurance to review medication deductible status  Staff: please relay message to patient

## 2021-03-26 ENCOUNTER — OFFICE VISIT (OUTPATIENT)
Dept: NEUROLOGY | Facility: CLINIC | Age: 76
End: 2021-03-26
Payer: COMMERCIAL

## 2021-03-26 VITALS
SYSTOLIC BLOOD PRESSURE: 123 MMHG | BODY MASS INDEX: 24.35 KG/M2 | DIASTOLIC BLOOD PRESSURE: 64 MMHG | HEIGHT: 66 IN | HEART RATE: 73 BPM | WEIGHT: 151.5 LBS | RESPIRATION RATE: 14 BRPM

## 2021-03-26 DIAGNOSIS — R42 DIZZINESS AND GIDDINESS: ICD-10-CM

## 2021-03-26 DIAGNOSIS — G31.84 MILD COGNITIVE IMPAIRMENT: Primary | ICD-10-CM

## 2021-03-26 DIAGNOSIS — G91.2 NORMAL PRESSURE HYDROCEPHALUS (HCC): ICD-10-CM

## 2021-03-26 PROCEDURE — 1036F TOBACCO NON-USER: CPT | Performed by: PSYCHIATRY & NEUROLOGY

## 2021-03-26 PROCEDURE — 1160F RVW MEDS BY RX/DR IN RCRD: CPT | Performed by: PSYCHIATRY & NEUROLOGY

## 2021-03-26 PROCEDURE — 99215 OFFICE O/P EST HI 40 MIN: CPT | Performed by: PSYCHIATRY & NEUROLOGY

## 2021-03-26 RX ORDER — DONEPEZIL HYDROCHLORIDE 5 MG/1
5 TABLET, FILM COATED ORAL
Qty: 90 TABLET | Refills: 1 | Status: SHIPPED | OUTPATIENT
Start: 2021-03-26 | End: 2022-04-11 | Stop reason: SDUPTHER

## 2021-03-26 NOTE — PROGRESS NOTES
[facility] MULTIPLE SCLEROSIS CENTER  PATIENT:  Shilpa Mazariegos  MRN:  [id number]  :  1945  DATE OF SERVICE:  3/26/2021    Assessment/Plan:           Problem List Items Addressed This Visit        Nervous and Auditory    Normal pressure hydrocephalus (HCC)    Relevant Medications    donepezil (ARICEPT) 5 mg tablet       Other    Dizziness and giddiness      Other Visit Diagnoses     Mild cognitive impairment    -  Primary    Relevant Medications    donepezil (ARICEPT) 5 mg tablet          Mr Joseph Chinchilla has presented to [facility] for follow-up on dizziness, dysphagia, blurred vision   - patient was last evaluated at [facility] for normal pressure hydrocephalus related dysfunction back in   Patient return from [place] in  with no new focal neurological deficit has been described  Patient has not follow-up with Neurosurgery  Patient described no balance dysfunction, he ambulates freely without assistive devices  Patient describes some cognitive dysfunction more forgetfulness reported, this is in part can be due to age-related cognitive decline in addition to normal pressure hydrocephalus related brain parenchymal changes  Patient will be advised to consider Aricept 5 mg at night time, risk and benefits of the medication has been discussed, patient cannot tolerate medication, patient may consider taking half a tablet at night until he comes back to [facility] for re-evaluation  - patient followed my recommendation as he had imaging studies of the brain completed back in , with stable radiographic findings appreciated in comparison to   MRA head was completed for evaluation of dizziness, no signs of intracranial atherosclerotic changes appreciated at that time  Patient described no signs of dizziness since then     Patient is to follow with Neurology advanced practitioner team in 3-6 months for evaluation of cognitive dysfunction, with mini-mental status will be assumed in Armenian at that time  Patient is to continue practicing safety measures during the novel coronovirus public health emergency  Subjective:  Dizziness, blurred vision, unsteadiness  HPI     Mr Ade Blanton has presented to [facility] [address] for evaluation  Patient was last seen in [facility] in 2018, with evaluation was provided for transient dizziness, dysphagia, blurriness and upper neck muscle pain  Patient had MRI brain and MRA head - Unremarkable MRA of the [place] of [place]  MRI brain will yet as the morning of GB review [facility] she had no we might is single which is again the waiting for final on responses is more amenable to some babysitting message of cooling vest your right thumb is moving 8/2015- 9/2018: Stable MRI of the brain with redemonstrated ventriculomegaly and prominence of the extra-axial spaces in a pattern consistent with the reported history of normal pressure hydrocephalus  Dr Dalia Becker had evaluation in June 2015 with NPH diagnosis and VPH was placed with follow up with [facility] completed in 2016       Patient has been in [facility] twice within 2 years       Mr Ade Blanton  Has resented for NPH, with  shunt was placed 3 year ago  CT head was completed 12/2016 with stable findings described  Patient has been following with Dr Jericho [facility]       Patient has a history of DM, CAD, AFib, aortic valve stenosis  Patient described no dysphagia, no dysarthria, no facial asymmetry  Patient has persistent balance issues, with no shuffling gait has been noted  Patient believes his ambulation has not changed since last seen in July 2018  Patient also recognized his more forgetful recently and he just came from [facility] to [place]  No sensory motor dysfunction noted in his upper extremities    The following portions of the patient's history were reviewed and updated as appropriate: He  has a past medical history of Anemia, Arthritis, CAD (coronary artery disease), Cardiac murmur, Depression, Diabetes mellitus ([facility] ), GERD (gastroesophageal reflux disease), History of blood transfusion, Hydrocephalus ([facility] ), Hypertension, and PAF (paroxysmal atrial fibrillation) ([place] [address] )  He   Patient Active Problem List    Diagnosis Date Noted    Dyslipidemia 01/27/2021    Stage 2 chronic kidney disease 01/27/2021    Need for vaccination 01/27/2021    Screening for cardiovascular condition 01/27/2021    Screen for colon cancer 01/27/2021    Mixed hyperlipidemia 12/23/2020    Presbycusis of both ears 10/09/2020    Chronic pain of both knees 10/09/2020    Chronic bilateral low back pain without sciatica 10/09/2020    Screening PSA (prostate specific antigen) 11/23/2018    Chronic kidney disease (CKD) stage G1/A2, glomerular filtration rate (GFR) equal to or greater than 90 mL/min/1 73 square meter and albuminuria creatinine ratio between  mg/g 09/18/2018    Dysphagia 07/31/2018    Myofascial pain syndrome, cervical 07/31/2018    Dizziness and giddiness 07/31/2018    H/O aortic valve replacement 06/28/2018    GERD (gastroesophageal reflux disease) 03/15/2016    Normal pressure hydrocephalus ([facility] ) 03/14/2016    Essential hypertension 03/14/2016    Type 2 diabetes mellitus ([place] [address] ) 03/14/2016    Coronary artery disease 11/03/2015    Aortic prosthetic valve regurgitation 08/29/2014    Aortic stenosis 08/28/2014     He  has a past surgical history that includes Bowel resection; Valve replacement; Aortic valve replacement; Colon surgery (2014); Colon surgery (1980); and pr create shunt:ventric-peritoneal (Right, 3/14/2016)  His family history includes Alzheimer's disease in his mother;  Anxiety disorder in his child; Cancer in his other; Depression in his child; Diabetes in his brother, father, mother, other, and sister; Glaucoma in his father, mother, and other; Heart attack in his father; Hyperlipidemia in his father, mother, and other; Kidney disease in his father and other; Other in his mother and other; Pancreatitis in his other; Rheum arthritis in his child; Stroke in his mother  He  reports that he quit smoking about 24 years ago  He quit after 15 00 years of use  He has quit using smokeless tobacco  He reports current alcohol use  He reports that he does not use drugs  Current Outpatient Medications   Medication Sig Dispense Refill    Accu-Chek FastClix Lancets MISC TEST THREE TIMES DAILY 204 each 2    amoxicillin (AMOXIL) 500 mg capsule TAKE 1 CAP THREE TIMES A DAY X7 DAYS      ascorbic acid (VITAMIN C) 250 mg tablet Take 250 mg by mouth daily      aspirin (ECOTRIN) 325 mg EC tablet Take 1 tablet by mouth daily      atorvastatin (LIPITOR) 20 mg tablet Take 1 tablet (20 mg total) by mouth daily at bedtime 90 tablet 1    COMFORT EZ PEN NEEDLES 32G X 5 MM MISC CHECK BLOOD SUGARS 4 TIMES PER DAY   DX: E11 9, Z79 4 200 each 2    diclofenac sodium (VOLTAREN) 1 % Apply 2 g topically 4 (four) times a day 1 Tube 0    econazole nitrate 1 % cream Apply to affected area once a day for 10 days 15 g 0    glucose blood (OneTouch Ultra) test strip Use 1 each 4 (four) times a day Test blood sugar 4 times 200 each 2    insulin aspart (NovoLOG FlexPen) 100 UNIT/ML injection pen Inject 12 units before breakfast, 8 units before lunch and 8 units before supper for diabetes 30 mL 1    insulin glargine (Lantus SoloStar) 100 units/mL injection pen INJECT 12 UNITS AT BEDTIME 15 mL 3    losartan (COZAAR) 25 mg tablet Take 1 tablet (25 mg total) by mouth daily 90 tablet 1    Melatonin-Pyridoxine (MELATIN PO) Take 8 mg by mouth daily at bedtime      metFORMIN (GLUCOPHAGE) 500 mg tablet Take 1 tablet (500 mg total) by mouth 2 (two) times a day with meals 100 tablet 3    VITAMIN D PO Take 500 Units by mouth daily      donepezil (ARICEPT) 5 mg tablet Take 1 tablet (5 mg total) by mouth daily at bedtime 90 tablet 1    ketoconazole (NIZORAL) 2 % cream Apply topically daily for 10 days 15 g 0     No current facility-administered medications for this visit  Current Outpatient Medications on File Prior to Visit   Medication Sig    Accu-Chek FastClix Lancets MISC TEST THREE TIMES DAILY    amoxicillin (AMOXIL) 500 mg capsule TAKE 1 CAP THREE TIMES A DAY X7 DAYS    ascorbic acid (VITAMIN C) 250 mg tablet Take 250 mg by mouth daily    aspirin (ECOTRIN) 325 mg EC tablet Take 1 tablet by mouth daily    atorvastatin (LIPITOR) 20 mg tablet Take 1 tablet (20 mg total) by mouth daily at bedtime    COMFORT EZ PEN NEEDLES 32G X 5 MM MISC CHECK BLOOD SUGARS 4 TIMES PER DAY  DX: E11 9, Z79 4    diclofenac sodium (VOLTAREN) 1 % Apply 2 g topically 4 (four) times a day    econazole nitrate 1 % cream Apply to affected area once a day for 10 days    glucose blood (OneTouch Ultra) test strip Use 1 each 4 (four) times a day Test blood sugar 4 times    insulin aspart (NovoLOG FlexPen) 100 UNIT/ML injection pen Inject 12 units before breakfast, 8 units before lunch and 8 units before supper for diabetes    insulin glargine (Lantus SoloStar) 100 units/mL injection pen INJECT 12 UNITS AT BEDTIME    losartan (COZAAR) 25 mg tablet Take 1 tablet (25 mg total) by mouth daily    Melatonin-Pyridoxine (MELATIN PO) Take 8 mg by mouth daily at bedtime    metFORMIN (GLUCOPHAGE) 500 mg tablet Take 1 tablet (500 mg total) by mouth 2 (two) times a day with meals    VITAMIN D PO Take 500 Units by mouth daily    ketoconazole (NIZORAL) 2 % cream Apply topically daily for 10 days     No current facility-administered medications on file prior to visit  He is allergic to chlorhexidine            Objective:    Blood pressure 123/64, pulse 73, resp  rate 14, height 5' 6" (1 676 m), weight 68 7 kg (151 lb 8 oz)  Physical Exam    Neurological Exam  CONSTITUTIONAL: NAD, pleasant  NECK: supple, no lymphadenopathy, no thyromegaly, no JVD  CARDIOVASCULAR: RRR, normal S1S2, no murmurs, no rubs  RESP: clear to auscultation bilaterally, no wheezes/rhonchi/rales  ABDOMEN: soft, non tender, non distended  SKIN: no rash or skin lesions  EXTREMITIES: no edema, pulses 2+bilaterally  PSYCH: appropriate mood and affect  NEUROLOGIC COMPREHENSIVE EXAM: Patient is oriented to person, place and time, NAD; appropriate affect  CN II, III, IV, V, VI, VII,VIII,IX,X,XI-XII intact with EOMI, PERRLA, OKN intact, VF grossly intact, fundi poorly visualized secondary to pupillary constriction; symmetric face noted  Motor: 5/5 UE/LE bilateral symmetric; Sensory: intact to light touch and pinprick bilaterally; normal vibration sensation feet bilaterally; Coordination within normal limits on FTN and MARIAN testing; DTR: 1/4 through, no Babinski, no clonus  Tandem gait is mildly abnormal  Romberg: negative  ROS:  12 points of review of system was reviewed with the patient and was unremarkable with exception: see HPI  Review of Systems   Constitutional: Negative  Negative for appetite change and fever  HENT: Negative  Negative for hearing loss, tinnitus, trouble swallowing and voice change  Eyes: Negative  Negative for photophobia and pain  Respiratory: Negative  Negative for shortness of breath  Cardiovascular: Negative  Negative for palpitations  Gastrointestinal: Negative  Negative for nausea and vomiting  Endocrine: Negative  Negative for cold intolerance  Genitourinary: Negative  Negative for dysuria, frequency and urgency  Musculoskeletal: Negative  Negative for myalgias and neck pain  Skin: Negative  Negative for rash  Allergic/Immunologic: Negative  Neurological: Negative  Negative for dizziness, tremors, seizures, syncope, facial asymmetry, speech difficulty, weakness, light-headedness, numbness and headaches  Hematological: Negative  Does not bruise/bleed easily  Psychiatric/Behavioral: Negative    Negative for confusion, hallucinations and sleep disturbance

## 2021-03-30 DIAGNOSIS — E11.9 TYPE 2 DIABETES MELLITUS WITHOUT COMPLICATION, WITHOUT LONG-TERM CURRENT USE OF INSULIN (HCC): Chronic | ICD-10-CM

## 2021-03-31 DIAGNOSIS — E11.9 TYPE 2 DIABETES MELLITUS WITHOUT COMPLICATION, WITHOUT LONG-TERM CURRENT USE OF INSULIN (HCC): Chronic | ICD-10-CM

## 2021-04-01 DIAGNOSIS — E11.9 TYPE 2 DIABETES MELLITUS WITHOUT COMPLICATION, WITHOUT LONG-TERM CURRENT USE OF INSULIN (HCC): Chronic | ICD-10-CM

## 2021-04-02 DIAGNOSIS — E11.9 TYPE 2 DIABETES MELLITUS WITHOUT COMPLICATION, WITHOUT LONG-TERM CURRENT USE OF INSULIN (HCC): Chronic | ICD-10-CM

## 2021-04-05 ENCOUNTER — TELEPHONE (OUTPATIENT)
Dept: INTERNAL MEDICINE CLINIC | Facility: CLINIC | Age: 76
End: 2021-04-05

## 2021-04-05 DIAGNOSIS — E11.9 TYPE 2 DIABETES MELLITUS WITHOUT COMPLICATION, WITHOUT LONG-TERM CURRENT USE OF INSULIN (HCC): Chronic | ICD-10-CM

## 2021-04-05 RX ORDER — INSULIN ASPART 100 [IU]/ML
INJECTION, SOLUTION INTRAVENOUS; SUBCUTANEOUS
Qty: 30 ML | Refills: 1 | Status: SHIPPED | OUTPATIENT
Start: 2021-04-05 | End: 2022-03-08 | Stop reason: SDUPTHER

## 2021-04-05 NOTE — TELEPHONE ENCOUNTER
Janina is requesting a prior authorization for this patient's novalog  His daughter is dropping off forms for you to fill out

## 2021-04-05 NOTE — TELEPHONE ENCOUNTER
Per Barb, they only cover the [employer] only  I have sent back to you to authorize as the original prescription was sent in as generic  Please authorize

## 2021-04-07 DIAGNOSIS — E11.9 TYPE 2 DIABETES MELLITUS WITHOUT COMPLICATION, WITHOUT LONG-TERM CURRENT USE OF INSULIN (HCC): Chronic | ICD-10-CM

## 2021-04-07 RX ORDER — INSULIN ASPART 100 [IU]/ML
INJECTION, SOLUTION INTRAVENOUS; SUBCUTANEOUS
Qty: 30 ML | Refills: 1 | Status: CANCELLED | OUTPATIENT
Start: 2021-04-07

## 2021-04-07 NOTE — TELEPHONE ENCOUNTER
Spoke to Judy Ortiz regarding prescription  They did receive the request for the Novolog Dea Blackburn on 4/5/21 and it has gone through the insurance without needing a prior authorization  The patient was informed

## 2021-05-05 NOTE — PROGRESS NOTES
Assessment/Plan:    No problem-specific Assessment & Plan notes found for this encounter  Diagnoses and all orders for this visit:    Type 2 diabetes mellitus without complication, with long-term current use of insulin ([facility])  Comments:  bloodwork ordered, ada diet  Orders:  -     Discontinue: insulin aspart (NovoLOG) 100 units/mL injection; Inject 12 Units under the skin daily with breakfast  -     Discontinue: insulin aspart (NovoLOG) 100 units/mL injection; Inject 8 Units under the skin daily with lunch  -     Discontinue: insulin aspart (NovoLOG) 100 units/mL injection; Inject 8 Units under the skin daily with dinner  -     Discontinue: Blood Glucose Monitoring Suppl (ONE TOUCH ULTRA SYSTEM KIT) w/Device KIT; by Does not apply route 4 (four) times a day  -     Discontinue: ONETOUCH DELICA LANCETS 57C MISC; by Does not apply route 4 times day  -     HEMOGLOBIN A1C W/ EAG ESTIMATION; Future  -     Comprehensive metabolic panel; Future  -     Microalbumin / creatinine urine ratio  -     TSH, 3rd generation with T4 reflex; Future  -     CBC and differential; Future  -     Ambulatory referral to Ophthalmology; Future  -     Ambulatory referral to Podiatry; Future  -     Discontinue: Blood Glucose Monitoring Suppl (ACCU-CHEK MENDEZ PLUS) w/Device KIT; by Does not apply route 3 (three) times a day  -     Discontinue: glucose blood test strip;  Check bs tid  -     Insulin Pen Needle (BD PEN NEEDLE ALEM U/F) 32G X 4 MM MISC; by Does not apply route 4 (four) times a day  -     insulin aspart (NovoLOG) 100 Units/mL SOPN; 12 units with breakfast, 8 with lunch, 8 with dinner  -     Blood Glucose Monitoring Suppl (ACCU-CHEK ALEM SMARTVIEW) w/Device KIT; by Does not apply route 4 (four) times a day  -     glucose blood (ACCU-CHEK SMARTVIEW) test strip; Use as instructed  -     insulin glargine (LANTUS SOLOSTAR) injection pen 100 units/mL; 14 units qhs    Lower abdominal pain  -     Ambulatory referral to Gastroenterology; Future    Normal pressure hydrocephalus  -     Ambulatory referral to Neurology; Future    H/O allograft aortic valve replacement  -     Ambulatory referral to Cardiology; Future    Screening for AAA (abdominal aortic aneurysm)  -     US ABDOMINAL AORTA SCREENING AAA; Future    Dyslipidemia  -     atorvastatin (LIPITOR) 20 mg tablet; Take 1 tablet (20 mg total) by mouth daily at bedtime    Other orders  -     aspirin (ECOTRIN) 325 mg EC tablet; Take 1 tablet by mouth daily  -     Discontinue: Insulin Pen Needle (BD PEN NEEDLE ALEM U/F) 32G X 4 MM MISC; by Does not apply route  -     Discontinue: Anant Hickey LANCETS 28L MISC; by Does not apply route  -     Discontinue: glucose blood test strip; by In Vitro route  -     Discontinue: sertraline (ZOLOFT) 25 mg tablet; Take 1 tablet by mouth daily          Subjective:      Patient ID: Kit Ruby is a 67 y o  male  Patient has not been seen for two years  Here for reg follow up    Needs bloodwork    Dm- eating healthy and taking meds as directed  Needs diabetic foot exam    Has hx of hydrocephalus and needs referral to neuro    Hx of bowel surgery and low abdominal pain- needs referral for GI    Due for aaa screening      Diabetes   He presents for his follow-up diabetic visit  He has type 2 diabetes mellitus  His disease course has been stable  There are no hypoglycemic associated symptoms  There are no hypoglycemic complications  Symptoms are stable  There are no diabetic complications  Risk factors for coronary artery disease include diabetes mellitus and hypertension  He is compliant with treatment all of the time  The following portions of the patient's history were reviewed and updated as appropriate: allergies, current medications, past family history, past medical history, past social history, past surgical history and problem list     Review of Systems   Constitutional: Negative  HENT: Negative  Eyes: Negative      Respiratory: Negative  Cardiovascular: Negative  Gastrointestinal: Negative  Musculoskeletal: Negative  Neurological: Negative  Objective:      BP (!) 112/48 (BP Location: Left arm, Patient Position: Sitting, Cuff Size: Standard)   Pulse 68   Resp 16   Ht 5' 4" (1 626 m)   Wt 70 3 kg (155 lb)   SpO2 95%   BMI 26 61 kg/m²          Physical Exam   Constitutional: He is oriented to person, place, and time  He appears well-developed and well-nourished  HENT:   Head: Normocephalic and atraumatic  Eyes: Conjunctivae are normal  Pupils are equal, round, and reactive to light  Neck: Normal range of motion  Neck supple  Cardiovascular: Normal rate and regular rhythm  Pulses are no weak pulses  Pulses:       Dorsalis pedis pulses are 2+ on the right side, and 2+ on the left side  Posterior tibial pulses are 2+ on the right side, and 2+ on the left side  Pulmonary/Chest: Effort normal and breath sounds normal    Abdominal: Soft  Bowel sounds are normal    Musculoskeletal: Normal range of motion  Feet:   Right Foot:   Skin Integrity: Negative for ulcer, skin breakdown, erythema, warmth, callus or dry skin  Neurological: He is alert and oriented to person, place, and time  Skin: Skin is warm and dry  Patient's shoes and socks removed  Right Foot/Ankle   Right Foot Inspection  Skin Exam: skin normal and skin intact no dry skin, no warmth, no callus, no erythema, no maceration, no abnormal color, no pre-ulcer, no ulcer and no callus                          Toe Exam: ROM and strength within normal limits  Sensory   Vibration: intact  Proprioception: intact   Monofilament testing: intact  Vascular  Capillary refills: < 3 seconds  The right DP pulse is 2+  The right PT pulse is 2+     Right Toe  - Comprehensive Exam  Ecchymosis: none    Left Foot/Ankle  Left Foot Inspection                           Toe Exam: ROM and strength within normal limits                   Sensory   Vibration: intact  Proprioception: intact  Monofilament: intact  Vascular  Capillary refills: < 3 seconds  The left DP pulse is 2+  The left PT pulse is 2+  Left Toe  - Comprehensive Exam  Ecchymosis: none  Assign Risk Category:  No deformity present; No loss of protective sensation;  No weak pulses       Risk: 0 CRF 4 stable; DM 2.     P) Home today, follow up as op.

## 2021-12-21 DIAGNOSIS — E78.5 DYSLIPIDEMIA: ICD-10-CM

## 2021-12-22 RX ORDER — ATORVASTATIN CALCIUM 20 MG/1
TABLET, FILM COATED ORAL
Qty: 90 TABLET | Refills: 1 | Status: SHIPPED | OUTPATIENT
Start: 2021-12-22 | End: 2022-03-08 | Stop reason: SDUPTHER

## 2022-03-08 ENCOUNTER — OFFICE VISIT (OUTPATIENT)
Dept: FAMILY MEDICINE CLINIC | Facility: CLINIC | Age: 77
End: 2022-03-08
Payer: COMMERCIAL

## 2022-03-08 VITALS
WEIGHT: 157 LBS | RESPIRATION RATE: 16 BRPM | SYSTOLIC BLOOD PRESSURE: 120 MMHG | BODY MASS INDEX: 26.8 KG/M2 | DIASTOLIC BLOOD PRESSURE: 80 MMHG | HEART RATE: 68 BPM | TEMPERATURE: 97.6 F | HEIGHT: 64 IN

## 2022-03-08 DIAGNOSIS — E11.9 TYPE 2 DIABETES MELLITUS WITHOUT COMPLICATION, WITHOUT LONG-TERM CURRENT USE OF INSULIN (HCC): ICD-10-CM

## 2022-03-08 DIAGNOSIS — E11.9 TYPE 2 DIABETES MELLITUS WITHOUT COMPLICATION, WITH LONG-TERM CURRENT USE OF INSULIN (HCC): ICD-10-CM

## 2022-03-08 DIAGNOSIS — I48.0 PAF (PAROXYSMAL ATRIAL FIBRILLATION) (HCC): Primary | ICD-10-CM

## 2022-03-08 DIAGNOSIS — G31.84 MILD COGNITIVE IMPAIRMENT: ICD-10-CM

## 2022-03-08 DIAGNOSIS — Z79.4 TYPE 2 DIABETES MELLITUS WITHOUT COMPLICATION, WITH LONG-TERM CURRENT USE OF INSULIN (HCC): ICD-10-CM

## 2022-03-08 DIAGNOSIS — Z13.29 SCREENING FOR THYROID DISORDER: ICD-10-CM

## 2022-03-08 DIAGNOSIS — I10 ESSENTIAL HYPERTENSION: ICD-10-CM

## 2022-03-08 DIAGNOSIS — E78.5 DYSLIPIDEMIA: ICD-10-CM

## 2022-03-08 DIAGNOSIS — N18.2 STAGE 2 CHRONIC KIDNEY DISEASE: ICD-10-CM

## 2022-03-08 DIAGNOSIS — G91.2 NORMAL PRESSURE HYDROCEPHALUS (HCC): ICD-10-CM

## 2022-03-08 LAB — SL AMB POCT HEMOGLOBIN AIC: 6.9 (ref ?–6.5)

## 2022-03-08 PROCEDURE — 1036F TOBACCO NON-USER: CPT | Performed by: FAMILY MEDICINE

## 2022-03-08 PROCEDURE — 1160F RVW MEDS BY RX/DR IN RCRD: CPT | Performed by: FAMILY MEDICINE

## 2022-03-08 PROCEDURE — 99214 OFFICE O/P EST MOD 30 MIN: CPT | Performed by: FAMILY MEDICINE

## 2022-03-08 PROCEDURE — 83036 HEMOGLOBIN GLYCOSYLATED A1C: CPT | Performed by: FAMILY MEDICINE

## 2022-03-08 RX ORDER — INSULIN GLARGINE 100 [IU]/ML
INJECTION, SOLUTION SUBCUTANEOUS
Qty: 15 ML | Refills: 3 | Status: SHIPPED | OUTPATIENT
Start: 2022-03-08

## 2022-03-08 RX ORDER — LANCETS 33 GAUGE
EACH MISCELLANEOUS 4 TIMES DAILY
Qty: 200 EACH | Refills: 2 | Status: SHIPPED | OUTPATIENT
Start: 2022-03-08

## 2022-03-08 RX ORDER — METFORMIN HYDROCHLORIDE 500 MG/1
500 TABLET, EXTENDED RELEASE ORAL 2 TIMES DAILY WITH MEALS
Qty: 180 TABLET | Refills: 1 | Status: SHIPPED | OUTPATIENT
Start: 2022-03-08 | End: 2022-09-04

## 2022-03-08 RX ORDER — ATORVASTATIN CALCIUM 20 MG/1
20 TABLET, FILM COATED ORAL
Qty: 90 TABLET | Refills: 1 | Status: SHIPPED | OUTPATIENT
Start: 2022-03-08

## 2022-03-08 RX ORDER — BLOOD SUGAR DIAGNOSTIC
1 STRIP MISCELLANEOUS 4 TIMES DAILY
Qty: 200 EACH | Refills: 2
Start: 2022-03-08

## 2022-03-08 RX ORDER — INSULIN ASPART 100 [IU]/ML
INJECTION, SOLUTION INTRAVENOUS; SUBCUTANEOUS
Qty: 30 ML | Refills: 1 | Status: SHIPPED | OUTPATIENT
Start: 2022-03-08

## 2022-03-08 RX ORDER — LOSARTAN POTASSIUM 25 MG/1
25 TABLET ORAL DAILY
Qty: 90 TABLET | Refills: 1 | Status: SHIPPED | OUTPATIENT
Start: 2022-03-08

## 2022-03-08 RX ORDER — LANCETS
EACH MISCELLANEOUS 3 TIMES DAILY
Qty: 204 EACH | Refills: 3 | Status: SHIPPED | OUTPATIENT
Start: 2022-03-08

## 2022-03-08 NOTE — ASSESSMENT & PLAN NOTE
Patient has hyperlipidemia  Currently Lipitor 20 mg daily  Upon review blood work, stable  At goal less than 70 for LDL  Continue with Lipitor 20 mg  Refill sent to pharmacy

## 2022-03-08 NOTE — ASSESSMENT & PLAN NOTE
Patient has mild cognitive impairment  States that he has been more forgetful lately  Previously on Aricept 5 mg daily  Has not been taking this medication on sure why he he did taken in 1st place  Discussed with patient the symptoms of alzheimer's dementia  Patient likely started on this medication for this      Advised patient follow-up with specialist

## 2022-03-08 NOTE — ASSESSMENT & PLAN NOTE
Blood pressure is well control at this time  A currently on losartan 25 milligrams daily  Blood pressure today was 120/80  Continue same  Medication refilled for 90 days with 1 refill    Follow-up in 6 months

## 2022-03-08 NOTE — ASSESSMENT & PLAN NOTE
Patient has history of PE AFib  Currently controlled  No anticoagulation    Advised patient follow-up with cardiologist

## 2022-03-08 NOTE — PROGRESS NOTES
Assessment/Plan:    Type 2 diabetes mellitus ([facility] )  Discussed with patient that A1c has improved  Down to 6 9 today  Will continue patient on a Lantus 12 units at bedtime  Decrease NovoLog to 8 units with breakfast and 4 units with lunch and dinner  Start patient on metformin extended release 500 milligrams twice a day  Continue checking a sugars 4 times a day  Advised patient to keep log and bring in on next visit  Follow-up in 1 month    Lab Results   Component Value Date    HGBA1C 6 9 (A) 03/08/2022       Essential hypertension  Blood pressure is well control at this time  A currently on losartan 25 milligrams daily  Blood pressure today was 120/80  Continue same  Medication refilled for 90 days with 1 refill  Follow-up in 6 months    PAF (paroxysmal atrial fibrillation) ([facility] 75 )  Patient has history of PE AFib  Currently controlled  No anticoagulation  Advised patient follow-up with cardiologist    Normal pressure hydrocephalus  Patient has normal-pressure high some hydrocephalus  Previously seen by neurologist     Juan King patient to go make appointment with neurologist for re-evaluation  Stage 2 chronic kidney disease  Lab Results   Component Value Date    EGFR 69 10/19/2020    EGFR 68 12/13/2018    EGFR 68 06/30/2018    CREATININE 1 05 10/19/2020    CREATININE 1 07 12/13/2018    CREATININE 1 07 06/30/2018     Patient has stage 2 chronic kidney disease  Continue with losartan 50 mg daily for blood pressure control and help protect the kidneys  Will continue to monitor  Advised patient that he should establish with nephrologist     Dyslipidemia  Patient has hyperlipidemia  Currently Lipitor 20 mg daily  Upon review blood work, stable  At goal less than 70 for LDL  Continue with Lipitor 20 mg  Refill sent to pharmacy  Mild cognitive impairment  Patient has mild cognitive impairment  States that he has been more forgetful lately  Previously on Aricept 5 mg daily  Has not been taking this medication on sure why he he did taken in 1st place  Discussed with patient the symptoms of alzheimer's dementia  Patient likely started on this medication for this  Advised patient follow-up with specialist       BMI Counseling: Body mass index is 27 38 kg/m²  The BMI is above normal  Nutrition recommendations include decreasing portion sizes and moderation in carbohydrate intake  Exercise recommendations include moderate physical activity 150 minutes/week  No pharmacotherapy was ordered  Rationale for BMI follow-up plan is due to patient being overweight or obese  Diagnoses and all orders for this visit:    PAF (paroxysmal atrial fibrillation) ([facility] )    Type 2 diabetes mellitus without complication, without long-term current use of insulin ([facility])  -     POCT hemoglobin A1c  -     metFORMIN (GLUCOPHAGE-XR) 500 mg 24 hr tablet; Take 1 tablet (500 mg total) by mouth 2 (two) times a day with meals  -     glucose blood (OneTouch Ultra) test strip; Use 1 each 4 (four) times a day Test blood sugar 4 times  -     NovoLOG FlexPen 100 units/mL injection pen; Inject 12 units before breakfast, 8 units before lunch and 8 units before supper for diabetes  -     Ambulatory Referral to Podiatry; Future  -     Microalbumin / creatinine urine ratio    Type 2 diabetes mellitus without complication, with long-term current use of insulin ([facility] )  Comments:  bloodwork ordered, ada diet  Orders:  -     Accu-Chek FastClix Lancets MISC; Inject into the skin 3 (three) times a day Test  -     Insulin Pen Needle (Comfort EZ Pen Needles) 32G X 5 MM MISC; Inject into the skin 4 (four) times a day  -     insulin glargine (Lantus SoloStar) 100 units/mL injection pen; INJECT 12 UNITS AT BEDTIME    Normal pressure hydrocephalus (HCC)    Mild cognitive impairment    Dyslipidemia  -     atorvastatin (LIPITOR) 20 mg tablet;  Take 1 tablet (20 mg total) by mouth daily at bedtime  -     CBC and differential; Future  -     Lipid panel; Future    Stage 2 chronic kidney disease  -     losartan (COZAAR) 25 mg tablet; Take 1 tablet (25 mg total) by mouth daily  -     CBC and differential; Future    Essential hypertension  -     Comprehensive metabolic panel; Future    Screening for thyroid disorder  -     TSH, 3rd generation with Free T4 reflex; Future          Subjective:   Chief Complaint   Patient presents with   [address] [facility] Patient Visit     Health Maintenance   Topic Date Due    Hepatitis C Screening  Never done    COVID-19 Vaccine (1) Never done    DTaP,Tdap,and Td Vaccines (1 - Tdap) Never done    SLP PLAN OF CARE  09/06/2018    Influenza Vaccine (1) 09/01/2021    Fall Risk  10/07/2021    Depression Screening  10/07/2021    Medicare Annual Wellness Visit (AWV)  10/07/2021    HEMOGLOBIN A1C  06/08/2022    DM Eye Exam  11/02/2022    BMI: Followup Plan  03/08/2023    BMI: Adult  03/08/2023    Diabetic Foot Exam  03/08/2023    Pneumococcal Vaccine: 65+ Years  Completed    HIB Vaccine  Aged Out    Hepatitis B Vaccine  Aged Out    IPV Vaccine  Aged Out    Hepatitis A Vaccine  Aged Out    Meningococcal ACWY Vaccine  Aged Out    HPV Vaccine  Aged Out        Patient ID: Katerin Madera is a 67 y o  male  HPI    Patient presents to hospitals care  Diabetes type 2:  Last A1c was 7 8 in January of 2021  Repeat today was 6 9  Currently on insulin NovoLog 12 units with breakfast 8 units with lunch and dinner, 12 units of Lantus at night  Has not been on metformin although previously did take this  Was a very bad diabetic  Would like to be restarted on metformin  Would like referral to podiatrist to help with routine foot care  Needs refill for diabetic supplies  Hyperlipidemia: On atorvastatin 20 mg daily  Needs refill for this  Hypertension:  Needs refill of losartan 25 mg daily  Blood pressure has been stable      The following portions of the patient's history were reviewed and updated as appropriate: allergies, current medications, past family history, past medical history, past social history, past surgical history, and problem list     Review of Systems   Constitutional: Negative for chills and fever  HENT: Negative for congestion  Respiratory: Negative for cough and shortness of breath  Cardiovascular: Negative for chest pain and palpitations  Gastrointestinal: Negative for nausea and vomiting  Genitourinary: Negative for dysuria  Musculoskeletal: Negative for myalgias  Skin: Negative for rash  Neurological: Negative for dizziness and headaches  Objective:  /80   Pulse 68   Temp 97 6 °F (36 4 °C) (Tympanic)   Resp 16   Ht 5' 3 5" (1 613 m)   Wt 71 2 kg (157 lb)   BMI 27 38 kg/m²      Physical Exam  Vitals and nursing note reviewed  Constitutional:       General: He is not in acute distress  HENT:      Head: Normocephalic and atraumatic  Right Ear: Tympanic membrane normal       Left Ear: Tympanic membrane normal    Eyes:      Conjunctiva/sclera: Conjunctivae normal    Cardiovascular:      Rate and Rhythm: Normal rate and regular rhythm  Pulses: Normal pulses  no weak pulses          Dorsalis pedis pulses are 2+ on the right side and 2+ on the left side  Posterior tibial pulses are 2+ on the right side and 2+ on the left side  Heart sounds: No murmur heard  Pulmonary:      Effort: Pulmonary effort is normal  No respiratory distress  Breath sounds: No wheezing, rhonchi or rales  Abdominal:      General: Bowel sounds are normal  There is no distension  Palpations: Abdomen is soft  Tenderness: There is no abdominal tenderness  Musculoskeletal:         General: No swelling  Feet:      Right foot:      Skin integrity: No ulcer, skin breakdown, erythema, warmth, callus or dry skin  Left foot:      Skin integrity: No ulcer, skin breakdown, erythema, warmth, callus or dry skin     Lymphadenopathy: Cervical: No cervical adenopathy  Skin:     Comments: Overgrown toenails are multiple toes  Neurological:      Mental Status: He is alert  Patient's shoes and socks removed  Right Foot/Ankle   Right Foot Inspection  Skin Exam: skin normal and skin intact  No dry skin, no warmth, no callus, no erythema, no maceration, no abnormal color, no pre-ulcer, no ulcer and no callus  Toe Exam: ROM and strength within normal limits  No swelling, no tenderness, erythema and  no right toe deformity    Sensory   Monofilament testing: intact    Vascular  Capillary refills: < 3 seconds  The right DP pulse is 2+  The right PT pulse is 2+  Left Foot/Ankle  Left Foot Inspection  Skin Exam: skin normal and skin intact  No dry skin, no warmth, no erythema, no maceration, normal color, no pre-ulcer, no ulcer and no callus  Toe Exam: ROM and strength within normal limits  No swelling, no tenderness, no erythema and no left toe deformity  Sensory   Monofilament testing: intact    Vascular  Capillary refills: < 3 seconds  The left DP pulse is 2+  The left PT pulse is 2+  Assign Risk Category  No deformity present  No loss of protective sensation  No weak pulses  Risk: 0          This note has been constructed using a voice recognition system  There may be translation, syntax, or grammatical errors  If you have an questions, please contact the dictating provider

## 2022-03-08 NOTE — ASSESSMENT & PLAN NOTE
Discussed with patient that A1c has improved  Down to 6 9 today  Will continue patient on a Lantus 12 units at bedtime  Decrease NovoLog to 8 units with breakfast and 4 units with lunch and dinner  Start patient on metformin extended release 500 milligrams twice a day  Continue checking a sugars 4 times a day  Advised patient to keep log and bring in on next visit      Follow-up in 1 month    Lab Results   Component Value Date    HGBA1C 6 9 (A) 03/08/2022

## 2022-03-08 NOTE — ASSESSMENT & PLAN NOTE
Patient has normal-pressure high some hydrocephalus  Previously seen by neurologist     Lupe Win patient to go make appointment with neurologist for re-evaluation

## 2022-03-22 ENCOUNTER — APPOINTMENT (OUTPATIENT)
Dept: LAB | Facility: HOSPITAL | Age: 77
End: 2022-03-22
Attending: FAMILY MEDICINE
Payer: COMMERCIAL

## 2022-03-22 DIAGNOSIS — E78.5 DYSLIPIDEMIA: ICD-10-CM

## 2022-03-22 DIAGNOSIS — I10 ESSENTIAL HYPERTENSION: ICD-10-CM

## 2022-03-22 DIAGNOSIS — Z13.29 SCREENING FOR THYROID DISORDER: ICD-10-CM

## 2022-03-22 DIAGNOSIS — N18.2 STAGE 2 CHRONIC KIDNEY DISEASE: ICD-10-CM

## 2022-03-22 LAB
ALBUMIN SERPL BCP-MCNC: 3.5 G/DL (ref 3.5–5)
ALP SERPL-CCNC: 127 U/L (ref 46–116)
ALT SERPL W P-5'-P-CCNC: 35 U/L (ref 12–78)
ANION GAP SERPL CALCULATED.3IONS-SCNC: 5 MMOL/L (ref 4–13)
AST SERPL W P-5'-P-CCNC: 28 U/L (ref 5–45)
BASOPHILS # BLD AUTO: 0.05 THOUSANDS/ΜL (ref 0–0.1)
BASOPHILS NFR BLD AUTO: 1 % (ref 0–1)
BILIRUB SERPL-MCNC: 0.82 MG/DL (ref 0.2–1)
BUN SERPL-MCNC: 22 MG/DL (ref 5–25)
CALCIUM SERPL-MCNC: 9.6 MG/DL (ref 8.3–10.1)
CHLORIDE SERPL-SCNC: 110 MMOL/L (ref 100–108)
CHOLEST SERPL-MCNC: 117 MG/DL
CO2 SERPL-SCNC: 27 MMOL/L (ref 21–32)
CREAT SERPL-MCNC: 1.22 MG/DL (ref 0.6–1.3)
EOSINOPHIL # BLD AUTO: 0.26 THOUSAND/ΜL (ref 0–0.61)
EOSINOPHIL NFR BLD AUTO: 4 % (ref 0–6)
ERYTHROCYTE [DISTWIDTH] IN BLOOD BY AUTOMATED COUNT: 14.1 % (ref 11.6–15.1)
GFR SERPL CREATININE-BSD FRML MDRD: 56 ML/MIN/1.73SQ M
GLUCOSE P FAST SERPL-MCNC: 144 MG/DL (ref 65–99)
HCT VFR BLD AUTO: 45.4 % (ref 36.5–49.3)
HDLC SERPL-MCNC: 39 MG/DL
HGB BLD-MCNC: 14.6 G/DL (ref 12–17)
IMM GRANULOCYTES # BLD AUTO: 0.02 THOUSAND/UL (ref 0–0.2)
IMM GRANULOCYTES NFR BLD AUTO: 0 % (ref 0–2)
LDLC SERPL CALC-MCNC: 60 MG/DL (ref 0–100)
LYMPHOCYTES # BLD AUTO: 1.14 THOUSANDS/ΜL (ref 0.6–4.47)
LYMPHOCYTES NFR BLD AUTO: 18 % (ref 14–44)
MCH RBC QN AUTO: 27 PG (ref 26.8–34.3)
MCHC RBC AUTO-ENTMCNC: 32.2 G/DL (ref 31.4–37.4)
MCV RBC AUTO: 84 FL (ref 82–98)
MONOCYTES # BLD AUTO: 0.73 THOUSAND/ΜL (ref 0.17–1.22)
MONOCYTES NFR BLD AUTO: 12 % (ref 4–12)
NEUTROPHILS # BLD AUTO: 4.03 THOUSANDS/ΜL (ref 1.85–7.62)
NEUTS SEG NFR BLD AUTO: 65 % (ref 43–75)
NONHDLC SERPL-MCNC: 78 MG/DL
NRBC BLD AUTO-RTO: 0 /100 WBCS
PLATELET # BLD AUTO: 180 THOUSANDS/UL (ref 149–390)
PMV BLD AUTO: 10.5 FL (ref 8.9–12.7)
POTASSIUM SERPL-SCNC: 4.2 MMOL/L (ref 3.5–5.3)
PROT SERPL-MCNC: 8.3 G/DL (ref 6.4–8.2)
RBC # BLD AUTO: 5.4 MILLION/UL (ref 3.88–5.62)
SODIUM SERPL-SCNC: 142 MMOL/L (ref 136–145)
TRIGL SERPL-MCNC: 90 MG/DL
TSH SERPL DL<=0.05 MIU/L-ACNC: 2.22 UIU/ML (ref 0.36–3.74)
WBC # BLD AUTO: 6.23 THOUSAND/UL (ref 4.31–10.16)

## 2022-03-22 PROCEDURE — 80053 COMPREHEN METABOLIC PANEL: CPT

## 2022-03-22 PROCEDURE — 85025 COMPLETE CBC W/AUTO DIFF WBC: CPT

## 2022-03-22 PROCEDURE — 36415 COLL VENOUS BLD VENIPUNCTURE: CPT

## 2022-03-22 PROCEDURE — 84443 ASSAY THYROID STIM HORMONE: CPT

## 2022-03-22 PROCEDURE — 80061 LIPID PANEL: CPT

## 2022-04-04 ENCOUNTER — RA CDI HCC (OUTPATIENT)
Dept: OTHER | Facility: HOSPITAL | Age: 77
End: 2022-04-04

## 2022-04-04 NOTE — PROGRESS NOTES
[facility]  coding opportunities          Chart Reviewed number of suggestions sent to Provider: 1  [id number] [phone]     Patients Insurance     Medicare Insurance: [facility] Advantage

## 2022-04-11 ENCOUNTER — OFFICE VISIT (OUTPATIENT)
Dept: FAMILY MEDICINE CLINIC | Facility: CLINIC | Age: 77
End: 2022-04-11
Payer: COMMERCIAL

## 2022-04-11 ENCOUNTER — TELEPHONE (OUTPATIENT)
Dept: FAMILY MEDICINE CLINIC | Facility: CLINIC | Age: 77
End: 2022-04-11

## 2022-04-11 VITALS
WEIGHT: 149.8 LBS | HEART RATE: 76 BPM | HEIGHT: 64 IN | TEMPERATURE: 97.8 F | BODY MASS INDEX: 25.57 KG/M2 | RESPIRATION RATE: 16 BRPM

## 2022-04-11 DIAGNOSIS — G89.29 CHRONIC BILATERAL LOW BACK PAIN WITHOUT SCIATICA: ICD-10-CM

## 2022-04-11 DIAGNOSIS — G31.84 MILD COGNITIVE IMPAIRMENT: ICD-10-CM

## 2022-04-11 DIAGNOSIS — Z00.00 MEDICARE ANNUAL WELLNESS VISIT, SUBSEQUENT: Primary | ICD-10-CM

## 2022-04-11 DIAGNOSIS — M54.50 CHRONIC BILATERAL LOW BACK PAIN WITHOUT SCIATICA: ICD-10-CM

## 2022-04-11 DIAGNOSIS — G91.2 NORMAL PRESSURE HYDROCEPHALUS (HCC): ICD-10-CM

## 2022-04-11 DIAGNOSIS — E11.9 TYPE 2 DIABETES MELLITUS WITHOUT COMPLICATION, WITHOUT LONG-TERM CURRENT USE OF INSULIN (HCC): ICD-10-CM

## 2022-04-11 DIAGNOSIS — F32.1 MODERATE MAJOR DEPRESSION (HCC): ICD-10-CM

## 2022-04-11 DIAGNOSIS — M54.2 NECK PAIN: ICD-10-CM

## 2022-04-11 DIAGNOSIS — H61.21 IMPACTED CERUMEN OF RIGHT EAR: ICD-10-CM

## 2022-04-11 LAB — SL AMB POCT GLUCOSE BLD: 63

## 2022-04-11 PROCEDURE — 1160F RVW MEDS BY RX/DR IN RCRD: CPT | Performed by: FAMILY MEDICINE

## 2022-04-11 PROCEDURE — G0439 PPPS, SUBSEQ VISIT: HCPCS | Performed by: FAMILY MEDICINE

## 2022-04-11 PROCEDURE — 1036F TOBACCO NON-USER: CPT | Performed by: FAMILY MEDICINE

## 2022-04-11 PROCEDURE — 3288F FALL RISK ASSESSMENT DOCD: CPT | Performed by: FAMILY MEDICINE

## 2022-04-11 PROCEDURE — 1170F FXNL STATUS ASSESSED: CPT | Performed by: FAMILY MEDICINE

## 2022-04-11 PROCEDURE — 99215 OFFICE O/P EST HI 40 MIN: CPT | Performed by: FAMILY MEDICINE

## 2022-04-11 PROCEDURE — 1125F AMNT PAIN NOTED PAIN PRSNT: CPT | Performed by: FAMILY MEDICINE

## 2022-04-11 PROCEDURE — 3725F SCREEN DEPRESSION PERFORMED: CPT | Performed by: FAMILY MEDICINE

## 2022-04-11 PROCEDURE — 82948 REAGENT STRIP/BLOOD GLUCOSE: CPT | Performed by: FAMILY MEDICINE

## 2022-04-11 RX ORDER — ESCITALOPRAM OXALATE 5 MG/1
5 TABLET ORAL DAILY
Qty: 30 TABLET | Refills: 2 | Status: SHIPPED | OUTPATIENT
Start: 2022-04-11 | End: 2022-06-27 | Stop reason: ALTCHOICE

## 2022-04-11 RX ORDER — DONEPEZIL HYDROCHLORIDE 5 MG/1
5 TABLET, FILM COATED ORAL
Qty: 90 TABLET | Refills: 1 | Status: SHIPPED | OUTPATIENT
Start: 2022-04-11 | End: 2022-06-27 | Stop reason: ALTCHOICE

## 2022-04-11 NOTE — PROGRESS NOTES
Assessment/Plan:    Type 2 diabetes mellitus ([facility] )  Patient is a A1c is fairly well controlled at this time  Will need to have repeat A1c in 3 months  A patient has been having some lightheaded/dizzy spells  A random glucose today was in the 60s  Discussed with patient that he cannot take insulin if he does not eat  Will do adjust insulin today to reduce risk of hypoglycemia  Advised patient to take NovoLog 4 units with breakfast and 8 units with lunch and dinner  Decrease Lantus to 10 units at bedtime  Continue with metformin 500 mg extended release b i d     Discussed with patient that we will likely adjust this further  Advised patient to keep blood sugar logs 4 times a day and bring in on next visit  Consider decreasing NovoLog and Lantus further and increasing the metformin to 750 mg extended release b i d  on next visit  Consideration to then she will be take patient off of insulin and just placed on oral therapies given patient's risk of worsening memory difficulties  A follow-up in 3 months  Lab Results   Component Value Date    HGBA1C 6 9 (A) 03/08/2022       Normal pressure hydrocephalus  Patient previously seen by neurologist   Started on donepezil  Patient has not been on medication for a while would likely due to constant moving from [facility] to [facility]     Will start patient on donepezil again 5 mg at bedtime  Advise daughter to make appointment neurologist for follow-up  Impacted cerumen of right ear  Will flush right ear out on next visit  Chronic bilateral low back pain without sciatica  Patient having chronic bilateral low back pain without sciatica for the past year  No red flag symptoms  Will refer over to physical therapy for evaluation and treatment of both back pain and neck pain  Follow-up as needed    Mild cognitive impairment  Patient has mild cognitive impairment  Previously seen by Neurology      Will restart patient on donepezil 5 mg at bedtime  Advised daughter to make patient has appointment for follow-up with neurologist     Moderate major depression [facility])  Patient suffering from moderate major depression  PHQ-9 score of 19  Patient was agreeable in starting medication  Will start Lexapro 5 mg daily  Discussed with patient about things that he was able do in the past   Patient would like to be working again  Encourage patient to take medications and also if appropriate once patient is memory is better, patient should either volunteer are due part-time work  Offer emotional support  Follow-up in 2 month        Neck pain  Same plan as low back pain  45 minutes spent speaking and counseling patient/daughter over the phone regarding diagnosis and treatment plan  Diagnoses and all orders for this visit:    Medicare annual wellness visit, subsequent    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[facility] )  -     escitalopram (LEXAPRO) 5 mg tablet; Take 1 tablet (5 mg total) by mouth daily    Type 2 diabetes mellitus without complication, without long-term current use of insulin (HCC)  -     POCT blood glucose    Chronic bilateral low back pain without sciatica  -     Ambulatory Referral to Physical Therapy; Future    Neck pain  -     Ambulatory Referral to Physical Therapy; Future    Normal pressure hydrocephalus (HCC)  -     donepezil (ARICEPT) 5 mg tablet; Take 1 tablet (5 mg total) by mouth daily at bedtime    Mild cognitive impairment  -     donepezil (ARICEPT) 5 mg tablet; Take 1 tablet (5 mg total) by mouth daily at bedtime    Impacted cerumen of right ear          Subjective:   Chief Complaint   Patient presents with   [facility] Wellness Visit     pt did not like the way donepezil made him feel so he stopped it      Dizziness     on and off     Health Maintenance   Topic Date Due    Hepatitis C Screening  Never done    COVID-19 Vaccine (1) Never done    SLP PLAN OF CARE  09/06/2018    Influenza Vaccine (1) 09/01/2021    Medicare Annual Wellness Visit (AWV)  10/07/2021    HEMOGLOBIN A1C  06/08/2022    DTaP,Tdap,and Td Vaccines (1 - Tdap) 04/11/2023 (Originally 12/6/1966)    DM Eye Exam  11/02/2022    BMI: Followup Plan  03/08/2023    Diabetic Foot Exam  03/08/2023    Fall Risk  04/11/2023    BMI: Adult  04/11/2023    Depression Remission PHQ  04/11/2023    Pneumococcal Vaccine: 65+ Years  Completed    HIB Vaccine  Aged Out    Hepatitis B Vaccine  Aged Out    IPV Vaccine  Aged Out    Hepatitis A Vaccine  Aged Out    Meningococcal ACWY Vaccine  Aged Out    HPV Vaccine  Aged Out        Patient ID: Milady Santos is a 67 y o  male  HPI    Mild cognitive impairment:  Patient reports having memory difficulties  Started on donepezil 5 mg at bedtime  Daughter reports that patient has not been taking for a while since he travels back and forth between [facility] and here  Moderate major depression:  Patient reports having depression without suicidality  Has been quite depressed for sometime especially over the past year  Patient previously on a grocery store in [facility] but unable to do that now  Patient was quite active and feels that he is worthless today  Willing to try medication  Patient did try sertraline in the past but did not like how that made him feel  Caused headaches at the time  Type 2 diabetes: The patient reports having hypoglycemic episodes and also feeling dizzy  Patient does eat 3 meals a day  Wonders if he needs to have his insulin adjusted  Currently on NovoLog 8 units with breakfast lunch and dinner, and Lantus 12 units at night  Also takes metformin 500 mg twice a day ER  Neck and low back pain: Patient reports having back pain over the past year  Would like to know if there is something else that can be done      The following portions of the patient's history were reviewed and updated as appropriate: allergies, current medications, past family history, past medical history, past social history, past surgical history, and problem list     Review of Systems   Constitutional: Negative for chills and fever  HENT: Negative for congestion  Respiratory: Negative for cough and shortness of breath  Cardiovascular: Negative for chest pain and palpitations  Gastrointestinal: Negative for diarrhea, nausea and vomiting  Genitourinary: Negative for dysuria  Musculoskeletal: Negative for myalgias  Skin: Negative for rash  Neurological: Positive for dizziness and light-headedness  Negative for headaches  Objective:  Pulse 76   Temp 97 8 °F (36 6 °C) (Tympanic)   Resp 16   Ht 5' 3 5" (1 613 m)   Wt 67 9 kg (149 lb 12 8 oz)   BMI 26 12 kg/m²      Physical Exam  Vitals reviewed  Constitutional:       General: He is not in acute distress  HENT:      Head: Normocephalic and atraumatic  Right Ear: There is impacted cerumen  Left Ear: Tympanic membrane normal       Nose: Nose normal       Mouth/Throat:      Mouth: Mucous membranes are moist       Pharynx: Oropharynx is clear  Eyes:      Conjunctiva/sclera: Conjunctivae normal    Cardiovascular:      Rate and Rhythm: Normal rate and regular rhythm  Pulses: Normal pulses  Heart sounds: No murmur heard  Pulmonary:      Effort: Pulmonary effort is normal  No respiratory distress  Breath sounds: No wheezing, rhonchi or rales  Abdominal:      General: Bowel sounds are normal  There is no distension  Palpations: Abdomen is soft  Tenderness: There is no abdominal tenderness  Musculoskeletal:         General: No swelling  Lymphadenopathy:      Cervical: No cervical adenopathy  Neurological:      Mental Status: He is alert  Psychiatric:         Attention and Perception: Attention and perception normal          Mood and Affect: Mood is depressed  Speech: Speech normal          Behavior: Behavior is cooperative  Thought Content:  Thought content normal  Thought content does not include homicidal or suicidal ideation  Judgment: Judgment normal                This note has been constructed using a voice recognition system  There may be translation, syntax, or grammatical errors  If you have an questions, please contact the dictating provider
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
## 2022-04-11 NOTE — ASSESSMENT & PLAN NOTE
Patient is a A1c is fairly well controlled at this time  Will need to have repeat A1c in 3 months  A patient has been having some lightheaded/dizzy spells  A random glucose today was in the 60s  Discussed with patient that he cannot take insulin if he does not eat  Will do adjust insulin today to reduce risk of hypoglycemia  Advised patient to take NovoLog 4 units with breakfast and 8 units with lunch and dinner  Decrease Lantus to 10 units at bedtime  Continue with metformin 500 mg extended release b i d     Discussed with patient that we will likely adjust this further  Advised patient to keep blood sugar logs 4 times a day and bring in on next visit  Consider decreasing NovoLog and Lantus further and increasing the metformin to 750 mg extended release b i d  on next visit  Consideration to then she will be take patient off of insulin and just placed on oral therapies given patient's risk of worsening memory difficulties  A follow-up in 3 months    Lab Results   Component Value Date    HGBA1C 6 9 (A) 03/08/2022

## 2022-04-11 NOTE — ASSESSMENT & PLAN NOTE
Patient previously seen by neurologist   Started on donepezil  Patient has not been on medication for a while would likely due to constant moving from [place] to [facility]     Will start patient on donepezil again 5 mg at bedtime  Advise daughter to make appointment neurologist for follow-up

## 2022-04-11 NOTE — PATIENT INSTRUCTIONS
Please start the following insulin regiment  Inject 4 units Novolog before breakfast  Inject 8 units NovoLog before lunch and supper  Inject 10 units of Lantus before bedtime  Continue with metformin twice a day  Please keep a blood sugar log so that I may adjust your insulin accordingly  please make physical therapy appointment for neck pain and back pain  Start Lexapro 5 mg daily for depression  Medicare Preventive Visit Patient Instructions  Thank you for completing your Welcome to Medicare Visit or Medicare Annual Wellness Visit today  Your next wellness visit will be due in one year (4/12/2023)  The screening/preventive services that you may require over the next 5-10 years are detailed below  Some tests may not apply to you based off risk factors and/or age  Screening tests ordered at today's visit but not completed yet may show as past due  Also, please note that scanned in results may not display below  Preventive Screenings:  Service Recommendations Previous Testing/Comments   Colorectal Cancer Screening  · Colonoscopy    · Fecal Occult Blood Test (FOBT)/Fecal Immunochemical Test (FIT)  · Fecal DNA/Cologuard Test  · Flexible Sigmoidoscopy Age: 54-65 years old   Colonoscopy: every 10 years (May be performed more frequently if at higher risk)  OR  FOBT/FIT: every 1 year  OR  Cologuard: every 3 years  OR  Sigmoidoscopy: every 5 years  Screening may be recommended earlier than age 48 if at higher risk for colorectal cancer  Also, an individualized decision between you and your healthcare provider will decide whether screening between the ages of 74-80 would be appropriate   Colonoscopy: 01/05/2015  FOBT/FIT: Not on file  Cologuard: Not on file  Sigmoidoscopy: Not on file          Prostate Cancer Screening Individualized decision between patient and health care provider in men between ages of 53-78   Medicare will cover every 12 months beginning on the day after your 50th birthday PSA: 0 5 ng/mL           Hepatitis C Screening Once for adults born between 1945 and 1965  More frequently in patients at high risk for Hepatitis C Hep C Antibody: Not on file        Diabetes Screening 1-2 times per year if you're at risk for diabetes or have pre-diabetes Fasting glucose: 144 mg/dL   A1C: 6 9        Cholesterol Screening Once every 5 years if you don't have a lipid disorder  May order more often based on risk factors  Lipid panel: 03/22/2022           Other Preventive Screenings Covered by Medicare:  1  Abdominal Aortic Aneurysm (AAA) Screening: covered once if your at risk  You're considered to be at risk if you have a family history of AAA or a male between the age of 73-68 who smoking at least 100 cigarettes in your lifetime  2  Lung Cancer Screening: covers low dose CT scan once per year if you meet all of the following conditions: (1) Age 50-69; (2) No signs or symptoms of lung cancer; (3) Current smoker or have quit smoking within the last 15 years; (4) You have a tobacco smoking history of at least 30 pack years (packs per day x number of years you smoked); (5) You get a written order from a healthcare provider  3  Glaucoma Screening: covered annually if you're considered high risk: (1) You have diabetes OR (2) Family history of glaucoma OR (3)  aged 48 and older OR (3)  American aged 72 and older  3  Osteoporosis Screening: covered every 2 years if you meet one of the following conditions: (1) Have a vertebral abnormality; (2) On glucocorticoid therapy for more than 3 months; (3) Have primary hyperparathyroidism; (4) On osteoporosis medications and need to assess response to drug therapy  5  HIV Screening: covered annually if you're between the age of 12-76  Also covered annually if you are younger than 13 and older than 72 with risk factors for HIV infection  For pregnant patients, it is covered up to 3 times per pregnancy      Immunizations:  Immunization Recommendations   Influenza Vaccine Annual influenza vaccination during flu season is recommended for all persons aged >= 6 months who do not have contraindications   Pneumococcal Vaccine (Prevnar and Pneumovax)  * Prevnar = PCV13  * Pneumovax = PPSV23 Adults 25-60 years old: 1-3 doses may be recommended based on certain risk factors  Adults 72 years old: Prevnar (PCV13) vaccine recommended followed by Pneumovax (PPSV23) vaccine  If already received PPSV23 since turning 65, then PCV13 recommended at least one year after PPSV23 dose  Hepatitis B Vaccine 3 dose series if at intermediate or high risk (ex: diabetes, end stage renal disease, liver disease)   Tetanus (Td) Vaccine - COST NOT COVERED BY MEDICARE PART B Following completion of primary series, a booster dose should be given every 10 years to maintain immunity against tetanus  Td may also be given as tetanus wound prophylaxis  Tdap Vaccine - COST NOT COVERED BY MEDICARE PART B Recommended at least once for all adults  For pregnant patients, recommended with each pregnancy  Shingles Vaccine (Shingrix) - COST NOT COVERED BY MEDICARE PART B  2 shot series recommended in those aged 48 and above     Health Maintenance Due:      Topic Date Due    Hepatitis C Screening  Never done     Immunizations Due:      Topic Date Due    COVID-19 Vaccine (1) Never done    DTaP,Tdap,and Td Vaccines (1 - Tdap) Never done    Influenza Vaccine (1) 09/01/2021     Advance Directives   What are advance directives? Advance directives are legal documents that state your wishes and plans for medical care  These plans are made ahead of time in case you lose your ability to make decisions for yourself  Advance directives can apply to any medical decision, such as the treatments you want, and if you want to donate organs  What are the types of advance directives? There are many types of advance directives, and each state has rules about how to use them   You may choose a combination of any of the following:  · Living will: This is a written record of the treatment you want  You can also choose which treatments you do not want, which to limit, and which to stop at a certain time  This includes surgery, medicine, IV fluid, and tube feedings  · Durable power of  for healthcare [facility]): This is a written record that states who you want to make healthcare choices for you when you are unable to make them for yourself  This person, called a proxy, is usually a family member or a friend  You may choose more than 1 proxy  · Do not resuscitate (DNR) order:  A DNR order is used in case your heart stops beating or you stop breathing  It is a request not to have certain forms of treatment, such as CPR  A DNR order may be included in other types of advance directives  · Medical directive: This covers the care that you want if you are in a coma, near death, or unable to make decisions for yourself  You can list the treatments you want for each condition  Treatment may include pain medicine, surgery, blood transfusions, dialysis, IV or tube feedings, and a ventilator (breathing machine)  · Values history: This document has questions about your views, beliefs, and how you feel and think about life  This information can help others choose the care that you would choose  Why are advance directives important? An advance directive helps you control your care  Although spoken wishes may be used, it is better to have your wishes written down  Spoken wishes can be misunderstood, or not followed  Treatments may be given even if you do not want them  An advance directive may make it easier for your family to make difficult choices about your care  Weight Management   Why it is important to manage your weight:  Being overweight increases your risk of health conditions such as heart disease, high blood pressure, type 2 diabetes, and certain types of cancer   It can also increase your risk for osteoarthritis, sleep apnea, and other respiratory problems  Aim for a slow, steady weight loss  Even a small amount of weight loss can lower your risk of health problems  How to lose weight safely:  A safe and healthy way to lose weight is to eat fewer calories and get regular exercise  You can lose up about 1 pound a week by decreasing the number of calories you eat by 500 calories each day  Healthy meal plan for weight management:  A healthy meal plan includes a variety of foods, contains fewer calories, and helps you stay healthy  A healthy meal plan includes the following:  · Eat whole-grain foods more often  A healthy meal plan should contain fiber  Fiber is the part of grains, fruits, and vegetables that is not broken down by your body  Whole-grain foods are healthy and provide extra fiber in your diet  Some examples of whole-grain foods are whole-wheat breads and pastas, oatmeal, brown rice, and bulgur  · Eat a variety of vegetables every day  Include dark, leafy greens such as spinach, kale, mily greens, and mustard greens  Eat yellow and orange vegetables such as carrots, sweet potatoes, and winter squash  · Eat a variety of fruits every day  Choose fresh or canned fruit (canned in its own juice or light syrup) instead of juice  Fruit juice has very little or no fiber  · Eat low-fat dairy foods  Drink fat-free (skim) milk or 1% milk  Eat fat-free yogurt and low-fat cottage cheese  Try low-fat cheeses such as mozzarella and other reduced-fat cheeses  · Choose meat and other protein foods that are low in fat  Choose beans or other legumes such as split peas or lentils  Choose fish, skinless poultry (chicken or turkey), or lean cuts of red meat (beef or pork)  Before you cook meat or poultry, cut off any visible fat  · Use less fat and oil  Try baking foods instead of frying them  Add less fat, such as margarine, sour cream, regular salad dressing and mayonnaise to foods   Eat fewer high-fat foods  Some examples of high-fat foods include french fries, doughnuts, ice cream, and cakes  · Eat fewer sweets  Limit foods and drinks that are high in sugar  This includes candy, cookies, regular soda, and sweetened drinks  Exercise:  Exercise at least 30 minutes per day on most days of the week  Some examples of exercise include walking, biking, dancing, and swimming  You can also fit in more physical activity by taking the stairs instead of the elevator or parking farther away from stores  Ask your healthcare provider about the best exercise plan for you  © Copyright [employer] 2018 Information is for End User's use only and may not be sold, redistributed or otherwise used for commercial purposes   All illustrations and images included in CareNotes® are the copyrighted property of A D A M , Inc  or [address] [place] [address]

## 2022-04-11 NOTE — ASSESSMENT & PLAN NOTE
Patient has mild cognitive impairment  Previously seen by Neurology  Will restart patient on donepezil 5 mg at bedtime      Advised daughter to make patient has appointment for follow-up with neurologist

## 2022-04-11 NOTE — ASSESSMENT & PLAN NOTE
Patient suffering from moderate major depression  PHQ-9 score of 19  Patient was agreeable in starting medication  Will start Lexapro 5 mg daily  Discussed with patient about things that he was able do in the past   Patient would like to be working again  Encourage patient to take medications and also if appropriate once patient is memory is better, patient should either volunteer are due part-time work  Offer emotional support      Follow-up in 2 month

## 2022-04-11 NOTE — ASSESSMENT & PLAN NOTE
Patient having chronic bilateral low back pain without sciatica for the past year  No red flag symptoms  Will refer over to physical therapy for evaluation and treatment of both back pain and neck pain      Follow-up as needed

## 2022-04-13 ENCOUNTER — TELEPHONE (OUTPATIENT)
Dept: INTERNAL MEDICINE CLINIC | Facility: CLINIC | Age: 77
End: 2022-04-13

## 2022-04-13 NOTE — TELEPHONE ENCOUNTER
Please remove the patient from Dr Diaz Don service  The patient is now seeing Dr Grace Ruffin   Thank you

## 2022-05-13 ENCOUNTER — TELEMEDICINE (OUTPATIENT)
Dept: FAMILY MEDICINE CLINIC | Facility: CLINIC | Age: 77
End: 2022-05-13
Payer: COMMERCIAL

## 2022-05-13 DIAGNOSIS — B34.9 VIRAL INFECTION: Primary | ICD-10-CM

## 2022-05-13 DIAGNOSIS — J01.40 ACUTE NON-RECURRENT PANSINUSITIS: ICD-10-CM

## 2022-05-13 PROCEDURE — 99214 OFFICE O/P EST MOD 30 MIN: CPT | Performed by: FAMILY MEDICINE

## 2022-05-13 PROCEDURE — 1036F TOBACCO NON-USER: CPT | Performed by: FAMILY MEDICINE

## 2022-05-13 PROCEDURE — 1160F RVW MEDS BY RX/DR IN RCRD: CPT | Performed by: FAMILY MEDICINE

## 2022-05-13 RX ORDER — BENZONATATE 200 MG/1
200 CAPSULE ORAL 3 TIMES DAILY PRN
Qty: 21 CAPSULE | Refills: 0 | Status: SHIPPED | OUTPATIENT
Start: 2022-05-13 | End: 2022-06-27 | Stop reason: ALTCHOICE

## 2022-05-13 RX ORDER — ALBUTEROL SULFATE 90 UG/1
2 AEROSOL, METERED RESPIRATORY (INHALATION) EVERY 6 HOURS PRN
Qty: 18 G | Refills: 0 | Status: SHIPPED | OUTPATIENT
Start: 2022-05-13 | End: 2022-06-27 | Stop reason: ALTCHOICE

## 2022-05-13 RX ORDER — AMOXICILLIN AND CLAVULANATE POTASSIUM 875; 125 MG/1; MG/1
1 TABLET, FILM COATED ORAL EVERY 12 HOURS SCHEDULED
Qty: 14 TABLET | Refills: 0 | Status: SHIPPED | OUTPATIENT
Start: 2022-05-13 | End: 2022-05-20

## 2022-05-13 RX ORDER — PREDNISONE 20 MG/1
20 TABLET ORAL DAILY
Qty: 5 TABLET | Refills: 0 | Status: SHIPPED | OUTPATIENT
Start: 2022-05-13 | End: 2022-05-18

## 2022-05-13 NOTE — ASSESSMENT & PLAN NOTE
Patient has symptoms consistent with that of non recurrent pansinusitis as well  Start patient on prednisone to help with congestion  Also start patient on Augmentin 875 mg b i d  for the next 7 days  If no improvement, advised patient in 1 week
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
## 2022-05-13 NOTE — ASSESSMENT & PLAN NOTE
Patient likely has COVID-19 infection  Wife tested positive last week  Patient has been sick for 10 days  Unfortunately patient is now out of the treatment time frame  Will treat patient symptomatically  Start prednisone 20 mg daily for the next 5 days  Tessalon Perles 200 mg t i d  p r n  for cough  Albuterol inhaler for 2 puffs every 4-6 hours as needed for shortness of breath wheezing  Advised patient to start Flonase 1 spray in each nostril daily as needed, if not helping, may increase to 2 sprays  Start OTC antihistamine such as Allegra, Claritin or Zyrtec daily for congestion  Start tea with honey as honey is a natural cough suppressant  Take Tylenol extra-strength 2 tablets every 8 hours as needed for discomfort or fever  Start taking vitamin-C, vitamin-D, and zinc daily  Hydrate and eat as tolerated    Follow-up as needed

## 2022-06-21 NOTE — TELEPHONE ENCOUNTER
06/21/22 10:53 AM        Thank you for your request  Your request has been received, reviewed, and the patient chart updated  The PCP has successfully been removed with a patient attribution note  This message will now be completed  Please remind staff to not remove PCP at office level for this scenario; [facility] Department will remove        Thank you  Adilene Dahl

## 2022-06-27 ENCOUNTER — OFFICE VISIT (OUTPATIENT)
Dept: FAMILY MEDICINE CLINIC | Facility: CLINIC | Age: 77
End: 2022-06-27
Payer: COMMERCIAL

## 2022-06-27 VITALS
WEIGHT: 145.4 LBS | OXYGEN SATURATION: 94 % | TEMPERATURE: 97.9 F | DIASTOLIC BLOOD PRESSURE: 64 MMHG | RESPIRATION RATE: 16 BRPM | SYSTOLIC BLOOD PRESSURE: 140 MMHG | BODY MASS INDEX: 24.82 KG/M2 | HEIGHT: 64 IN | HEART RATE: 72 BPM

## 2022-06-27 DIAGNOSIS — E78.5 DYSLIPIDEMIA: ICD-10-CM

## 2022-06-27 DIAGNOSIS — E11.9 TYPE 2 DIABETES MELLITUS WITHOUT COMPLICATION, WITHOUT LONG-TERM CURRENT USE OF INSULIN (HCC): Primary | ICD-10-CM

## 2022-06-27 DIAGNOSIS — F32.1 MODERATE MAJOR DEPRESSION (HCC): ICD-10-CM

## 2022-06-27 DIAGNOSIS — M54.2 NECK PAIN: ICD-10-CM

## 2022-06-27 DIAGNOSIS — I10 ESSENTIAL HYPERTENSION: ICD-10-CM

## 2022-06-27 DIAGNOSIS — I35.0 NONRHEUMATIC AORTIC VALVE STENOSIS: ICD-10-CM

## 2022-06-27 DIAGNOSIS — M17.0 PRIMARY OSTEOARTHRITIS OF BOTH KNEES: ICD-10-CM

## 2022-06-27 PROBLEM — R01.1 HEART MURMUR: Status: ACTIVE | Noted: 2022-06-27

## 2022-06-27 LAB — SL AMB POCT HEMOGLOBIN AIC: 6.4 (ref ?–6.5)

## 2022-06-27 PROCEDURE — 99214 OFFICE O/P EST MOD 30 MIN: CPT | Performed by: FAMILY MEDICINE

## 2022-06-27 PROCEDURE — 1036F TOBACCO NON-USER: CPT | Performed by: FAMILY MEDICINE

## 2022-06-27 PROCEDURE — 83036 HEMOGLOBIN GLYCOSYLATED A1C: CPT | Performed by: FAMILY MEDICINE

## 2022-06-27 PROCEDURE — 3077F SYST BP >= 140 MM HG: CPT | Performed by: FAMILY MEDICINE

## 2022-06-27 PROCEDURE — 3078F DIAST BP <80 MM HG: CPT | Performed by: FAMILY MEDICINE

## 2022-06-27 PROCEDURE — 1160F RVW MEDS BY RX/DR IN RCRD: CPT | Performed by: FAMILY MEDICINE

## 2022-06-27 RX ORDER — MELOXICAM 7.5 MG/1
7.5 TABLET ORAL DAILY
Qty: 30 TABLET | Refills: 2 | Status: SHIPPED | OUTPATIENT
Start: 2022-06-27

## 2022-06-27 NOTE — ASSESSMENT & PLAN NOTE
In cholesterol stable at this time  Continue with atorvastatin a 20 milligrams daily    Recheck in 6 months

## 2022-06-27 NOTE — ASSESSMENT & PLAN NOTE
Patient has hypertension  Current blood pressure stable at 140/64  Continue taking losartan 25 mg a daily      Follow-up in 6 months for recheck

## 2022-06-27 NOTE — ASSESSMENT & PLAN NOTE
Patient has become more active  Stop taking Lexapro  States that he is feeling better with better sleep  Taking melatonin to help with sleep  Recommend that patient continue melatonin as supplementation  Follow-up in 6 months or sooner as needed

## 2022-06-27 NOTE — PROGRESS NOTES
Assessment/Plan:    Type 2 diabetes mellitus ([facility] [address] )  Patient's A1c has improved to 6 4  Patient does not want to be taken off insulin  Will continue insulin with NovoLog 4 units in the morning and 8 units at lunch and dinner  Continue with 10 units of Lantus at night  Continue with metformin 500 mg extended release b i d  Samantha Serum Blood sugars are well control at this time  Discussed with patient about possibly decreasing the insulin dose to decrease risks of hypoglycemia and increasing oral medication  Patient does not want to do this at this time  Discussed with patient about the benefits of this  Patient will think more about this  Referral placed for Podiatry for routine diabetic care  Follow-up in 6 months    Lab Results   Component Value Date    HGBA1C 6 4 06/27/2022       Essential hypertension  Patient has hypertension  Current blood pressure stable at 140/64  Continue taking losartan 25 mg a daily  Follow-up in 6 months for recheck    Aortic stenosis  Patient not having any symptoms at this time  Will follow-up in 6 months and discuss further  Consider repeat echo  Primary osteoarthritis of both knees  Start patient on Mobic 7 5 milligrams daily  Discussed with patient not to take any other NSAIDs as this can cause increased risk for kidney problems as well as gastric ulcers  Patient has tried topical NSAIDs in the past without any much improvement  Follow-up in 6 months for recheck    Dyslipidemia  In cholesterol stable at this time  Continue with atorvastatin a 20 milligrams daily  Recheck in 6 months    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[facility] [address] )  Patient has become more active  Stop taking Lexapro  States that he is feeling better with better sleep  Taking melatonin to help with sleep  Recommend that patient continue melatonin as supplementation  Follow-up in 6 months or sooner as needed  Neck pain  Patient did not go to physical therapy      Will prescribe stretching exercises for patient  Information sheets given in Irish  Advised patient follow-up in 6 months or sooner as needed  Diagnoses and all orders for this visit:    Type 2 diabetes mellitus without complication, without long-term current use of insulin (HCC)  -     POCT hemoglobin A1c  -     Ambulatory Referral to Podiatry; Future    Primary osteoarthritis of both knees  -     meloxicam (Mobic) 7 5 mg tablet; Take 1 tablet (7 5 mg total) by mouth daily    Essential hypertension    Dyslipidemia    Nonrheumatic aortic valve stenosis    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HCC)    Neck pain        Subjective:   Chief Complaint   Patient presents with    Follow-up     Routine overdue  Health Maintenance   Topic Date Due    Hepatitis C Screening  Never done    COVID-19 Vaccine (1) Never done    SLP PLAN OF CARE  09/06/2018    Influenza Vaccine (Season Ended) 09/01/2022    HEMOGLOBIN A1C  09/27/2022    DTaP,Tdap,and Td Vaccines (1 - Tdap) 04/11/2023 (Originally 12/6/1966)    DM Eye Exam  11/02/2022    BMI: Followup Plan  03/08/2023    Fall Risk  04/11/2023    Medicare Annual Wellness Visit (AWV)  04/11/2023    Depression Remission PHQ  04/11/2023    BMI: Adult  06/27/2023    Diabetic Foot Exam  06/27/2023    Pneumococcal Vaccine: 65+ Years  Completed    HIB Vaccine  Aged Out    Hepatitis B Vaccine  Aged Out    IPV Vaccine  Aged Out    Hepatitis A Vaccine  Aged Out    Meningococcal ACWY Vaccine  Aged Out    HPV Vaccine  Aged Out        Patient ID: Hitesh Knight is a 67 y o  male  HPI    Patient presents for follow-up    Diabetes:  Patient was having dizzy spells on last visit  Became hypoglycemic  Currently taking NovoLog 4 units with breakfast and 8 units with lunch and dinner  Taking Lantus 10 units at bedtime  On metformin 500 mg extended release twice a day  States that a blood sugars are averaging in the 90s to low 120s  Repeat A1c was 6 4  No lower readings  Wants to stay on insulin  Normal-pressure hydrocephalus:  On donepezil  Did not follow-up with neurologist   Patient's daughter has not had the time to make appointment  Patient is no longer taking medication as he did not like how it made him feel  Moderate major depression:  PHQ-9 is 19  Started Lexapro 5 mg daily  Self discontinued off of medication  States that he did not like the feeling  Is sleeping better with the use of melatonin  Neck pain/chronic low back pain:  Did not schedule appointment with physical therapy  Would like stretching exercises to be done at home  The following portions of the patient's history were reviewed and updated as appropriate: allergies, current medications, past family history, past medical history, past social history, past surgical history, and problem list     Review of Systems   Constitutional: Negative for chills and fever  HENT: Negative for congestion  Respiratory: Negative for cough and shortness of breath  Cardiovascular: Negative for chest pain and palpitations  Gastrointestinal: Negative for diarrhea, nausea and vomiting  Genitourinary: Negative for dysuria  Musculoskeletal: Positive for arthralgias and back pain  Skin: Negative for rash  Neurological: Negative for dizziness and headaches  Psychiatric/Behavioral: Negative for sleep disturbance  Objective:  /64 (BP Location: Left arm, Patient Position: Sitting, Cuff Size: Adult)   Pulse 72   Temp 97 9 °F (36 6 °C) (Tympanic)   Resp 16   Ht 5' 3 5" (1 613 m)   Wt 66 kg (145 lb 6 4 oz)   SpO2 94%   BMI 25 35 kg/m²      Physical Exam  Vitals and nursing note reviewed  Constitutional:       General: He is not in acute distress  HENT:      Head: Normocephalic and atraumatic        Right Ear: Tympanic membrane normal       Left Ear: Tympanic membrane normal       Nose: Nose normal       Mouth/Throat:      Mouth: Mucous membranes are moist       Pharynx: Oropharynx is clear  Eyes:      Conjunctiva/sclera: Conjunctivae normal    Cardiovascular:      Rate and Rhythm: Normal rate and regular rhythm  Pulses: Normal pulses  no weak pulses          Dorsalis pedis pulses are 2+ on the right side and 2+ on the left side  Posterior tibial pulses are 2+ on the right side and 2+ on the left side  Heart sounds: Murmur (2/6 systolic murmur noted best on right 2nd IC space) heard  Pulmonary:      Effort: Pulmonary effort is normal  No respiratory distress  Breath sounds: No wheezing, rhonchi or rales  Abdominal:      General: Bowel sounds are normal  There is no distension  Palpations: Abdomen is soft  Tenderness: There is no abdominal tenderness  Musculoskeletal:         General: No swelling  Feet:      Right foot:      Skin integrity: No ulcer, skin breakdown, erythema, warmth, callus or dry skin  Left foot:      Skin integrity: No ulcer, skin breakdown, erythema, warmth, callus or dry skin  Lymphadenopathy:      Cervical: No cervical adenopathy  Neurological:      Mental Status: He is alert  Psychiatric:         Mood and Affect: Mood normal            Patient's shoes and socks removed  Right Foot/Ankle   Right Foot Inspection  Skin Exam: skin normal and skin intact  No dry skin, no warmth, no callus, no erythema, no maceration, no abnormal color, no pre-ulcer, no ulcer and no callus  Toe Exam: ROM and strength within normal limits  No swelling, no tenderness, erythema and  no right toe deformity    Sensory   Monofilament testing: intact    Vascular  Capillary refills: < 3 seconds  The right DP pulse is 2+  The right PT pulse is 2+  Left Foot/Ankle  Left Foot Inspection  Skin Exam: skin normal and skin intact  No dry skin, no warmth, no erythema, no maceration, normal color, no pre-ulcer, no ulcer and no callus  Toe Exam: ROM and strength within normal limits   No swelling, no tenderness, no erythema and no left toe deformity  Sensory   Monofilament testing: intact    Vascular  Capillary refills: < 3 seconds  The left DP pulse is 2+  The left PT pulse is 2+  Assign Risk Category  No deformity present  No loss of protective sensation  No weak pulses  Risk: 0          This note has been constructed using a voice recognition system  There may be translation, syntax, or grammatical errors  If you have an questions, please contact the dictating provider

## 2022-06-27 NOTE — ASSESSMENT & PLAN NOTE
Start patient on Mobic 7 5 milligrams daily  Discussed with patient not to take any other NSAIDs as this can cause increased risk for kidney problems as well as gastric ulcers  Patient has tried topical NSAIDs in the past without any much improvement      Follow-up in 6 months for recheck

## 2022-06-27 NOTE — ASSESSMENT & PLAN NOTE
Patient did not go to physical therapy  Will prescribe stretching exercises for patient  Information sheets given in Nepali  Advised patient follow-up in 6 months or sooner as needed

## 2022-06-27 NOTE — ASSESSMENT & PLAN NOTE
Patient's A1c has improved to 6 4  Patient does not want to be taken off insulin  Will continue insulin with NovoLog 4 units in the morning and 8 units at lunch and dinner  Continue with 10 units of Lantus at night  Continue with metformin 500 mg extended release b i d  Dortha Plough Blood sugars are well control at this time  Discussed with patient about possibly decreasing the insulin dose to decrease risks of hypoglycemia and increasing oral medication  Patient does not want to do this at this time  Discussed with patient about the benefits of this  Patient will think more about this  Referral placed for Podiatry for routine diabetic care    Follow-up in 6 months    Lab Results   Component Value Date    HGBA1C 6 4 06/27/2022

## 2022-06-27 NOTE — ASSESSMENT & PLAN NOTE
Patient not having any symptoms at this time  Will follow-up in 6 months and discuss further  Consider repeat echo

## 2022-06-27 NOTE — PATIENT INSTRUCTIONS
Ejercicios para el suki   CUIDADO AMBULATORIO:   Los ejercicios para el suki ayudan a reducir el dolor de suki y, al [employer], lo fortalecen y mejoran rincon movimiento  Los ejercicios para el suki también ayudan a prevenir problemas de suki a Diane Ramy  Lo que necesita saber acerca de los ejercicios para el suki:  Amena los ejercicios a diario o con la frecuencia indicada por rincon [place] lentamente, con cuidado y suavemente  Evite movimientos rápidos o bruscos  Póngase de pie y siéntese de la forma que rincon médico le ha [place]  La buena postura puede llegar a reducir rincon dolor de suki  Revise rincon postura con frecuencia, aún cuando no esté haciendo kely ejercicios de suki  Cómo realizar ejercicios para el suki de [place] martinez:  Posición de ejercicio: Puede sentarse o estar parado mientras realiza los ejercicios para el suki  Khadijah de frente  Los hombros deben estar rectos y [place], con [facility]  Inclinación de Madlyn Hides y [place] atrás: Incline rincon louisa suavemente tratando que rincon mentón toque rincon pecho  Rincon médico puede incluso pedirle que empuje la parte de atrás de rincon suki para ayudar a inclinar rincon louisa  Levante rincon barbilla hasta la posición inicial  Incline rincon louisa hacia atrás lo más que pueda de [place] que quede mirando hacia el jama raso  Es posible que rincon médico le pida que levante el mentón para así ayudar a inclinar rincon Bernetta Faes atrás  Regrese rincon louisa a la posición inicial          Inclinaciones de louisa, de lado a lado: Incline rincon louisa de manera que rincon oreja quede cerca de rincon hombro  Luego incline rincon louisa hacia rincon otro hombro  Giros de louisa: Gire rincon louisa elvis si fuera a mirar sobre el hombro  Incline rincon mentón hacia abajo y trate de que toque rincon hombro  No levante rincon hombro hasta que toque rincon mentón  Khadijah de frente otra vez  Amena lo mismo del otro lado           Giros de louisa: [place], lleve la [employer] pecho  A continuación, gire la louisa hacia la derecha  La oreja debe quedar ubicada por encima del hombro  Mantenga esta posición por 5 segundos  Gire la louisa otra vez hacia el pecho y, Desir, hacia la izquierda a la misma posición  Sostenga está posición por 5 segundos  Con cuidado, gire la louisa hacia atrás en círculo en el sentido de las manecillas del [place] [address] y repita 3 veces  A continuación, mueve la louisa en la dirección contraria (en el sentido contrario de las manecillas del relo) en círculo 3 veces  No encoja los hombros hacia arriba mientras realiza elisha ejercicio  Comuníquese con rincon médico si:  Rincon dolor no mejora o Simuel Grit  Usted tiene preguntas o inquietudes acerca de rincon afección, cuidado o programa de ejercicios  © Copyright [employer] 2022 Information is for End User's use only and may not be sold, redistributed or otherwise used for commercial purposes  All illustrations and images included in CareNotes® are the copyrighted property of A D A [employer]  or [address] [place] [address] es sólo para uso en educación  Rincon intención no es darle un consejo médico sobre enfermedades o tratamientos  Colsulte con rincon Gudelia Merl farmacéutico antes de seguir cualquier régimen médico para saber si es seguro y efectivo para usted  Ejercicios para la espalda baja   CUIDADO AMBULATORIO:   Los ejercicios para la espalda baja ayudan a sanar y a fortalecer los músculos de rincon espalda para evitar otra lesión  Pregúntele a rincon médico si usted necesita acudir con un fisioterapeuta para que le indique ejercicios más avanzado  Busque atención médica de inmediato si:  Usted tiene dolor severo que le impide moverse  Comuníquese con rincon médico si:  Rincon dolor empeora  Usted tiene un dolor nuevo  Usted tiene preguntas o inquietudes acerca de rincon condición o cuidado      Realice los ejercicios para la espalda baja de manera martinez:  Amena kely ejercicios sobre kalen colchoneta o superficie firme (no en la cama) para bina soporte a la columna y evitar dolor en la parte baja de la espalda  Muévase lenta y suavemente  Evite movimientos rápidos o bruscos  Respire normalmente  No contenga la respiración  Deténgase si siente dolor  Es normal que sienta cierta molestia al principio  Practicar los ejercicios con regularidad ayudará a disminuir rincon incomodidad con el paso del [place]  Ejercicios para la espalda baja: Rincon médico podría recomendarle que realice ejercicios para la espalda de 10 a 30 minutos cada día  También podría recomendarle que urmila ejercicios 1 a 3 veces cada día  Pregunte a rincon médico cuáles ejercicios son los mejores para usted y con qué frecuencia hacerlos  Bombeo del tobillo: Acuéstese boca arriba  Levante rincon pie (con kely dedos apuntando hacia rincon louisa)  Luego, baje rincon pie (con los dedos apuntando lejos de usted)  Repita elisha ejercicio 10 veces en cada lado  Deslizamiento de talón: Acuéstese boca arriba  Muy despacio doble kalen pierna y luego enderécela  Luego, doble la otra pierna y enderécela  Repita 10 veces en cada lado  Inclinación pélvica: Acuéstese boca arriba con kely rodillas dobladas y kely pies planos sobre el piso  Coloque kely brazos en kalen posición relajada junto a rincon cuerpo  Contraiga los músculos de rincon abdomen y aplane rincon espalda contra el piso  Sostenga está posición por 5 segundos  Repita 5 veces  Estiramiento de la espalda: Acuéstese boca arriba con kely jose detrás de rincon louisa  Doble kely rodillas y gire la mitad de rincon cuerpo hacia un lado  Mantenga esta posición por 10 segundos  Repita 3 veces en cada lado  Levantamiento de la pierna estirada: Acuéstese yesis [place] con kalen pierna estirada  Doble la otra rodilla  Contraiga rincon abdomen y luego levante lentamente la pierna estirada entre 6 a 12 pulgadas del piso  Mantenga esta posición por 1 a 5 segundos  Baje rnicon pierna lentamente  Repita 10 veces en cada pierna           Rodillas al pecho: Acuéstese boca arriba con kely rodillas dobladas y kely pies planos sobre el piso  [place] kalen de las rodillas hacia rincon pecho y sosténgala por 5 segundos  Regrese rincon pierna a la posición inicial  Levante la otra rodilla hacia el pecho y sosténgala por 5 segundos  Amena esto 5 veces en cada lado  Posición iris camello: Coloque kely jose y [email or web address]  Arquee rincon espalda Yousif Sick, hacia el techo y [facility]  Arquee rincon stella dorsal lo más posible  Sostenga está posición por 5 segundos  Levante rincon louisa hacia arriba y baje rincon pecho hacia el piso  Sostenga está posición por 5 segundos  Amena 3 series o elvis se le indique  Posición de cuclillas contra la pared: Párese con rincon espalda contra la pared  Contraiga los músculos de rincon abdomen  Lentamente deslice rincon cuerpo hasta que kely rodillas queden dobladas en un ángulo de 45 grados  Mantenga esta posición por 5 segundos  Deslice lentamente rincon espalda hacia arriba hasta quedar de pie  Repita 10 veces  Posición de acurrucarse: Acuéstese boca arriba con kely rodillas dobladas y kely pies planos sobre el piso  Coloque kely jose con las ayala hacia abajo debajo de la curva de la parte baja de rincon espalda  Después, con kely codos [place], levante kely hombros y [place] 2 a 3 pulgadas  Mantenga rincon louisa a la misma altura de kely hombros  Mantenga esta posición por 5 segundos  Cuando usted pueda hacer elisha ejercicio sin sentir dolor por 10 a 15 segundos, puede entonces añadir kalen rotación  Mientras kely hombros y rincon pecho estén levantados del piso, voltee levemente hacia la izquierda y [place]  Repita en el otro lado  Ejercicio pájaro abby: Coloque kely jose y rodillas sobre el piso  Mantenga kely muñecas directamente debajo de kely hombros y kely rodillas directamente debajo de kely caderas  Contraiga rincon ombligo hacia adentro en dirección a rincon columna  No estire ni arquee rincon espalda  Ponga tensos kely músculos abdominales  Levante un brazo extendido para que se alinee con rincon louisa  Luego, levante la pierna opuesta a rincon brazo  Mantenga esta posición por 15 segundos  Baje rinocn Geoffery Lock y pierna lentamente y [place] de lado  Amena 5 series  © Copyright [employer] 2022 Information is for End User's use only and may not be sold, redistributed or otherwise used for commercial purposes  All illustrations and images included in CareNotes® are the copyrighted property of A D A M , [address] es sólo para uso en educación  Rincon intención no es darle un consejo médico sobre enfermedades o tratamientos  Colsulte con rincon Ilda Bough farmacéutico antes de seguir cualquier régimen médico para saber si es seguro y efectivo para usted  Diabetes y nutrición   CUIDADO AMBULATORIO:   Los planes de nutrición ayudan a establecer patrones de alimentación saludable que mejoren la tim  Los planes de nutrición y ejercicio regular ayudan a mantener estable kely niveles de azúcar en la ilene  Shona Randall a retrasar o prevenir las complicaciones de la diabetes, elvis la enfermedad renal diabética  Llame al Darnn Lingo de emergencias local (911 en los [place]) si:  Tiene alguno de los siguientes signos de un ataque cardíaco:      Estrujamiento, presión o tensión en rincon pecho    Usted también podría presentar alguno de los siguientes:     Kumar Cally o dolor en rincon espalda, suki, mandíbula, abdomen, o brazo    Falta de [employer] o vómitos    Desvanecimiento o sudor frío repentino      [employer] atención médica de inmediato si:  Usted tiene un nivel bajo azúcar en la ilene y el problema no mejora con el tratamiento  Los síntomas son dificultad para pensar, latidos cardíacos agnes y sudoración  Rincon nivel de azúcar en la ilene está por encima de 240 mg/dl y no baja dentro de los 15 minutos del tratamiento  Usted tiene [place] ilene o en la orina      Tiene náuseas o vómitos y no puede retener alimentos o líquidos en el estómago  Usted tiene visión borrosa o doble  Rincon aliento tiene un [employer] a frutas o rincon respiración es superficial     Llame a rincon médico o al equipo de atención diabética si:  Kely niveles de azúcar en la ilene son superiores a las metas fijadas  Usted tiene bajos niveles de azúcar en rincon ilene frecuentemente  Usted tiene problemas para sobrellevar rincon diabetes o se siente ansioso o deprimido  Usted tiene preguntas o inquietudes acerca de rincon condición o cuidado  Un dietista lo ayudará a crear un plan de nutrición para satisfacer kely necesidades y las de [address]  El objetivo es que usted alcance o [place] un peso y niveles de azúcar en la ilene, presión arterial y lípidos saludables  Debería reunirte con el dietista al menos 1 vez al [facility] EV[facility] ROSS[facility]  Aprenderá lo siguiente:  Darin los alimentos afectan rincon nivel de azúcar en la ilene    Cómo generar hábitos de alimentación saludables    Cómo elegir los alimentos en función de rincon nivel de [place], [place] y niveles de glucosa    Cómo puede integrar kely comidas preferidas a rincon plan    Cómo hacer un seguimiento de los carbohidratos    El tamaño correcto de las porciones de cada alimento    Cambios que puede hacer en rincon plan si queda embarazada o está amamantando    Lo que puede hacer antes de reunirse con el dietista:  No se salte ninguna comida  La meta es mantener estable el nivel de azúcar en la [place]  Rincon nivel de azúcar en la ilene puede bajar demasiado si ha recibido insulina y no ha comido  Consuma más alimentos ricos en fibra, darin frutas y verduras frescas o congeladas, panes integrales y frijoles  La fibra ayuda a controlar o reducir los niveles de azúcar en la ilene y de [place]  Elija frutas enteras en lugar de jugo de fruta tanto darin sea posible  Es posible que se añada azúcar al jugo y se elimine la [place]  Seleccione grasas saludables para el corazón   Los alimentos con alto contenido de grasas saludables para el corazón incluyen el aceite de [place], las nueces, los [address] y los pescados grasos, elvis el salmón y el atún  Los alimentos con alto contenido de grasas no saludables incluyen la carne ken, los productos lácteos enteros y la margarina blanda  Las grasas no saludables pueden aumentar el riesgo de enfermedades cardíacas, aumentar el colesterol liana y reducir el huerta  Elija los carbohidratos complejos  Los alimentos con carbohidratos complejos incluyen el arroz integral, los panes y cereales integrales y los frijoles cocidos  Los alimentos con carbohidratos simples incluyen el pan trinidad, el arroz trinidad, la mayoría de los cereales fríos y los refrigerios  [place] plan incluirá la cantidad de carbohidratos a consumir de kalen vez o en un día  O[place]ira nivel de azúcar en la ilene puede subir demasiado si come muchos carbohidratos de kalen vez  Los niveles de azúcar en la ilene no aumentan o disminuyen tan rápidamente con los carbohidratos complejos elvis con los carbohidratos simples  Elija carbohidratos complejos siempre que sea posible  Consuma menos sodio (sal)  El riesgo de presión arterial lorena aumenta con los alimentos con alto contenido de Modi  Limite los alimentos altos en sodio, elvis, por ejemplo, salsa de soya, nita tostadas y sopas enlatadas  No añada sal a la comida que usted prepare  Restrinja el uso de sal de [place]  Estefani las etiquetas para que no tengan más de 2300 miligramos de sodio en un día  Limite los edulcorantes artificiales  Pueden encontrarse en alimentos o bebidas, elvis los refrescos de dieta u otras bebidas bajas en calorías  Los edulcorantes artificiales son bajos en calorías  Pueden ayudar a reducir Almon Patricia y los carbohidratos en general  Es importante no consumir más calorías de otros alimentos para compensar las calorías [place]  Los edulcorantes artificiales no tienen ningún tipo de nutrición  Coma alimentos integrales y maksim agua tanto elvis sea posible   Loiveira plan puede incluir bebidas con edulcorantes artificiales por un corto tiempo  [place] puede ayudarte a pasar de las bebidas con alto contenido de azúcar al agua  Utilice el método del plato para cada comida  Joelle método puede ayudar a comer la cantidad correcta de carbohidratos y [employer] niveles de azúcar en la ilene bajo control  Dibuje kalen línea imaginaria en medio de un plato de comida de 9 pulgadas  En un lado, dibuje otra línea para dividir alirio sección a la mitad  El plato tendrá [place] sección gabriel y [place]  Llene la sección más gabriel con verduras sin almidón  Estos incluyen al brócoli, espinacas, pepino, pimientos, coliflor y tomates  Agregue un almidón a kalen de las secciones pequeñas  Los almidones [employer], arroz, panes de harriet entero, tortillas, [place], nita y frijoles  Agregue carne u otra malik de proteína a la otra sección pequeña del plato  Por ejemplo, kizzy o pavo sin piel, pescado, carne de res o de BOONOO BOONOO, queso bajo en grasa, tofu y [place]  Agregue productos lácteos o fruta al lado de rincon plato si rincon plan de alimentación lo permite  Los ejemplos de productos lácteos incluyen [employer] o del 1% o yogur bajo en grasa  Si usted no balbina leche ni consume productos lácteos, es posible que pueda agregar otra porción de almidón en vez de eso  [place] kalen bebida baja en calorías o sin calorías con rincon comida  Por ejemplo, agua o café o té sin azúcar  Conozca los riesgos si decide beber alcohol: El alcohol puede causar hipoglucemia (bajo nivel de azúcar en la ilene), especialmente si [place] insulina  El alcohol puede causar niveles altos de azúcar en la ilene y de presión arterial, y aumento de peso si usted romie demasiado  Florencia Bernheim de [address] y los hombres de 72 años o más deben limitar el consumo de alcohol a 1 bebida por día  Los hombres de [address] de alcohol a 2 tragos al día   Un trago equivale a 12 onzas de cerveza, 5 onzas de vino o 1 onza y ½ de licor  La hipoglucemia puede ocurrir horas después de beber alcohol  Compruebe rincon nivel de azúcar en la ilene karen varias horas después de beber alcohol  Lleve consigo kalen malik de carbohidratos de acción rápida en ankit de que rincon nivel baje demasiado  Necesita atención inmediata si tiene signos o síntomas de hipoglucemia, elvis sudor, confusión o desmayos  Mantenga un peso saludable: Un peso saludable puede ayudarlo a controlar rincon diabetes  Usted puede mantener un peso saludable con un plan de nutrición y ejercicio  Consulte con rincon médico cuánto debería pesar  [place] que lo ayude a crear un plan para bajar de peso si tiene sobrepeso  Juntos pueden definir metas de pérdida y de mantenimiento del [place]  Acuda a kely consultas de control con el equipo de atención de la diabetes según le indicaron: Anote kely preguntas para que se acuerde de hacerlas karen kely visitas  © Copyright [employer] 2022 Information is for End User's use only and may not be sold, redistributed or otherwise used for commercial purposes  All illustrations and images included in CareNotes® are the copyrighted property of A D A [email or web address]  or [address] [place] [address] es sólo para uso en educación  Rincon intención no es darle un consejo médico sobre enfermedades o tratamientos  Colsulte con rincon Rigoberto Modi farmacéutico antes de seguir cualquier régimen médico para saber si es seguro y efectivo para usted

## 2022-08-16 ENCOUNTER — OFFICE VISIT (OUTPATIENT)
Dept: PODIATRY | Facility: CLINIC | Age: 77
End: 2022-08-16
Payer: COMMERCIAL

## 2022-08-16 VITALS
SYSTOLIC BLOOD PRESSURE: 134 MMHG | DIASTOLIC BLOOD PRESSURE: 73 MMHG | HEIGHT: 64 IN | BODY MASS INDEX: 24.55 KG/M2 | HEART RATE: 76 BPM | WEIGHT: 143.8 LBS

## 2022-08-16 DIAGNOSIS — E11.9 TYPE 2 DIABETES MELLITUS WITHOUT COMPLICATION, WITHOUT LONG-TERM CURRENT USE OF INSULIN (HCC): ICD-10-CM

## 2022-08-16 PROCEDURE — 3078F DIAST BP <80 MM HG: CPT | Performed by: PODIATRIST

## 2022-08-16 PROCEDURE — 99202 OFFICE O/P NEW SF 15 MIN: CPT | Performed by: PODIATRIST

## 2022-08-16 PROCEDURE — 1160F RVW MEDS BY RX/DR IN RCRD: CPT | Performed by: PODIATRIST

## 2022-08-16 PROCEDURE — 3075F SYST BP GE 130 - 139MM HG: CPT | Performed by: PODIATRIST

## 2022-08-16 RX ORDER — ESCITALOPRAM OXALATE 5 MG/1
5 TABLET ORAL DAILY
COMMUNITY
Start: 2022-08-04

## 2022-08-16 RX ORDER — DONEPEZIL HYDROCHLORIDE 5 MG/1
5 TABLET, FILM COATED ORAL
COMMUNITY
Start: 2022-08-04

## 2022-08-16 NOTE — PROGRESS NOTES
Assessment/Plan:    Discussed principles of diabetic foot care  Vascular status is within normal limits and sensorium is intact  Urged patient to refrain from walking barefoot  Trimmed all elongated toenails  No problem-specific Assessment & Plan notes found for this encounter  Diagnoses and all orders for this visit:    Type 2 diabetes mellitus without complication, without long-term current use of insulin ([facility] [address] )  -     Ambulatory Referral to Podiatry    Other orders  -     donepezil (ARICEPT) 5 mg tablet; Take 5 mg by mouth daily at bedtime  -     escitalopram (LEXAPRO) 5 mg tablet; Take 5 mg by mouth daily          Subjective:      Patient ID: Jaime Vanegas is a 67 y o  male  HPI     Patient, a 48-year-old type 2 diabetic male utilizing insulin and metformin for control presents for examination and care  Patient denies foot problems secondary to diabetes  He has difficulty trimming his toenails due to arthritis  Chief complaint involves long toenails that he cannot cut  He denies numbness or tingling in his feet  I personally reviewed A1c dated 03/08/2022  It was 6 9  I personally reviewed A1c dated 01/27/2021  It was 7 8  The following portions of the patient's history were reviewed and updated as appropriate: allergies, current medications, past family history, past medical history, past social history, past surgical history and problem list     Review of Systems   Gastrointestinal:        GERD   Musculoskeletal: Positive for arthralgias  Neurological: Negative for numbness  Psychiatric/Behavioral: Negative  Objective:      /73   Pulse 76   Ht 5' 3 5" (1 613 m)   Wt 65 2 kg (143 lb 12 8 oz)   BMI 25 07 kg/m²          Physical Exam  Cardiovascular:      Pulses: no weak pulses          Dorsalis pedis pulses are 2+ on the right side and 2+ on the left side  Posterior tibial pulses are 2+ on the right side and 2+ on the left side     Feet: Right foot:      Skin integrity: No ulcer, skin breakdown, erythema, warmth, callus or dry skin  Left foot:      Skin integrity: No ulcer, skin breakdown, erythema, warmth, callus or dry skin  Diabetic Foot Exam    Patient's shoes and socks removed  Right Foot/Ankle   Right Foot Inspection  Skin Exam: skin normal and skin intact  No dry skin, no warmth, no callus, no erythema, no maceration, no abnormal color, no pre-ulcer, no ulcer and no callus  Toe Exam: ROM and strength within normal limits  Sensory   Vibration: intact  Proprioception: intact  Monofilament testing: intact    Vascular  Capillary refills: < 3 seconds  The right DP pulse is 2+  The right PT pulse is 2+  Right Toe  - Comprehensive Exam  Ecchymosis: none  Arch: normal  Hammertoes: absent  Claw Toes: absent  Swelling: none   Tenderness: none         Left Foot/Ankle  Left Foot Inspection  Skin Exam: skin normal and skin intact  No dry skin, no warmth, no erythema, no maceration, normal color, no pre-ulcer, no ulcer and no callus  Toe Exam: ROM and strength within normal limits  Sensory   Vibration: intact  Proprioception: intact  Monofilament testing: intact    Vascular  Capillary refills: < 3 seconds  The left DP pulse is 2+  The left PT pulse is 2+       Left Toe  - Comprehensive Exam  Ecchymosis: none  Arch: normal  Hammertoes: absent  Claw toes: absent  Swelling: none   Tenderness: none           Assign Risk Category  No deformity present  No loss of protective sensation  No weak pulses  Risk: 0

## 2022-09-26 DIAGNOSIS — E11.9 TYPE 2 DIABETES MELLITUS WITHOUT COMPLICATION, WITHOUT LONG-TERM CURRENT USE OF INSULIN (HCC): ICD-10-CM

## 2022-09-27 RX ORDER — BLOOD SUGAR DIAGNOSTIC
1 STRIP MISCELLANEOUS 4 TIMES DAILY
Qty: 200 EACH | Refills: 2 | Status: SHIPPED | OUTPATIENT
Start: 2022-09-27

## 2022-09-27 RX ORDER — BLOOD SUGAR DIAGNOSTIC
STRIP MISCELLANEOUS
Qty: 100 EACH | OUTPATIENT
Start: 2022-09-27

## 2022-10-11 PROBLEM — H61.21 IMPACTED CERUMEN OF RIGHT EAR: Status: RESOLVED | Noted: 2022-04-11 | Resolved: 2022-10-11

## 2022-10-11 PROBLEM — J01.40 ACUTE NON-RECURRENT PANSINUSITIS: Status: RESOLVED | Noted: 2022-05-13 | Resolved: 2022-10-11

## 2022-10-12 PROBLEM — Z12.11 SCREEN FOR COLON CANCER: Status: RESOLVED | Noted: 2021-01-27 | Resolved: 2022-10-12

## 2022-10-12 PROBLEM — Z13.6 SCREENING FOR CARDIOVASCULAR CONDITION: Status: RESOLVED | Noted: 2021-01-27 | Resolved: 2022-10-12

## 2022-10-13 DIAGNOSIS — E11.9 TYPE 2 DIABETES MELLITUS WITHOUT COMPLICATION, WITHOUT LONG-TERM CURRENT USE OF INSULIN (HCC): ICD-10-CM

## 2022-10-14 RX ORDER — INSULIN ASPART 100 [IU]/ML
INJECTION, SOLUTION INTRAVENOUS; SUBCUTANEOUS
Qty: 30 ML | Refills: 1 | Status: SHIPPED | OUTPATIENT
Start: 2022-10-14

## 2022-10-14 NOTE — TELEPHONE ENCOUNTER
Requested medication(s) are due for refill today: Yes  Patient has already received a courtesy refill: No  Other reason request has been forwarded to provider: Patient has a appt with you next week

## 2022-10-19 ENCOUNTER — OFFICE VISIT (OUTPATIENT)
Dept: FAMILY MEDICINE CLINIC | Facility: CLINIC | Age: 77
End: 2022-10-19
Payer: COMMERCIAL

## 2022-10-19 VITALS
HEIGHT: 64 IN | BODY MASS INDEX: 24.59 KG/M2 | TEMPERATURE: 97.6 F | SYSTOLIC BLOOD PRESSURE: 120 MMHG | WEIGHT: 144 LBS | HEART RATE: 72 BPM | RESPIRATION RATE: 16 BRPM | DIASTOLIC BLOOD PRESSURE: 58 MMHG

## 2022-10-19 DIAGNOSIS — E78.2 MIXED HYPERLIPIDEMIA: ICD-10-CM

## 2022-10-19 DIAGNOSIS — Z23 ENCOUNTER FOR IMMUNIZATION: ICD-10-CM

## 2022-10-19 DIAGNOSIS — N18.2 STAGE 2 CHRONIC KIDNEY DISEASE: ICD-10-CM

## 2022-10-19 DIAGNOSIS — R13.10 DYSPHAGIA, UNSPECIFIED TYPE: ICD-10-CM

## 2022-10-19 DIAGNOSIS — Z95.2 H/O AORTIC VALVE REPLACEMENT: ICD-10-CM

## 2022-10-19 DIAGNOSIS — I10 ESSENTIAL HYPERTENSION: ICD-10-CM

## 2022-10-19 DIAGNOSIS — E78.5 DYSLIPIDEMIA: ICD-10-CM

## 2022-10-19 DIAGNOSIS — I35.0 NONRHEUMATIC AORTIC VALVE STENOSIS: ICD-10-CM

## 2022-10-19 DIAGNOSIS — K21.9 GASTROESOPHAGEAL REFLUX DISEASE WITHOUT ESOPHAGITIS: Chronic | ICD-10-CM

## 2022-10-19 DIAGNOSIS — M17.0 PRIMARY OSTEOARTHRITIS OF BOTH KNEES: ICD-10-CM

## 2022-10-19 DIAGNOSIS — E11.9 TYPE 2 DIABETES MELLITUS WITHOUT COMPLICATION, WITHOUT LONG-TERM CURRENT USE OF INSULIN (HCC): Primary | ICD-10-CM

## 2022-10-19 PROCEDURE — 90662 IIV NO PRSV INCREASED AG IM: CPT

## 2022-10-19 PROCEDURE — G0008 ADMIN INFLUENZA VIRUS VAC: HCPCS

## 2022-10-19 PROCEDURE — 99213 OFFICE O/P EST LOW 20 MIN: CPT | Performed by: NURSE PRACTITIONER

## 2022-10-19 NOTE — PATIENT INSTRUCTIONS
Stool softener 2-3 times a day with meals  Make appointment to follow up with gastroenterologist     Estreñimiento   CUIDADO AMBULATORIO:   El estreñimiento significa que usted tiene evacuaciones intestinales duras y secas o pasa más tiempo de lo normal entre cada evacuación intestinal  El estreñimiento podría ser provocado por falta de agua o alimentos altos en [place]  Ciertos medicamentos o la falta de [place] o de actividad física también pueden causar estreñimiento  Los síntomas más comunes incluyen los siguientes:  Dificultad para tener kely evacuaciones intestinales    Dolor o sangrado karen las evacuaciones intestinales    Sensación de que usted no terminó con rincon evacuación intestinal    Náuseas    Distensión abdominal    Dolor de louisa    Llame a rincon médico si:  Usted tiene ilene en kely evacuaciones intestinales  Usted tiene fiebre y dolor abdominal con el estreñimiento  El estreñimiento empeora  Usted comienza a vomitar  Usted tiene preguntas o inquietudes acerca de rincon condición o cuidado  Aliviar el estreñimiento: Los medicamentos pueden ayudarlo a tener evacuaciones intestinales más fácilmente  Los medicamentos pueden aumentar la humedad de kely evacuaciones intestinales o aumentar el movimiento de los intestinos  Un supositorio puede utilizarse para ayudar a ablandar las evacuaciones intestinales  [place] hace que [facility] fácil  El supositorio se guía hacia el recto a través del ano  Laxantes pueden ayudar a [employer] intestinos para que pueda tener kalen evacuación intestinal  Rincon médico podría recomendarle que use los laxantes solo karen un período breve  El uso a miriam plazo puede provocar que kely intestinos se acostumbren al [employer]  Un enema es un medicamento líquido que se [place] para eliminar las evacuaciones intestinales del recto  El medicamento se pone en el recto a través del ano  Prevenga estreñimiento:  [place] líquidos elvis se le haya indicado  Puede que necesite beber más líquidos para ayudar a ablandar las evacuaciones y evacuar el intestino  Pregunte cuánto líquido debe margaux cada día y cuáles líquidos son los más adecuados para usted  Coma alimentos altos en fibras  [place] podría ayudar a disminuir el estreñimiento al aumentar el volumen de las evacuaciones intestinales  Alimentos altos en [address] frutas, vegetales, panes y cereales integrales, y frijoles  Rincon médico o dietista puede ayudarle a crear un plan alimenticio con alto contenido de [place]  Rincon médico puede recomendarle un suplemento de fibra si no consume suficiente fibra de los alimentos  Realice actividad física con regularidad  La actividad física regular puede ayudarle a estimular kely intestinos  Caminar es un buen ejercicio para prevenir o aliviar el estreñimiento  Pregunte cuáles son los ejercicios más adecuados para usted  Programe kalen hora cada día para tener kalen evacuación intestinal  [place] podría ayudar a rincon cuerpo a acostumbrarse a tener evacuaciones intestinales regulares  Inclínese hacia adelante mientras está sentado en el inodoro para facilitar la expulsión de la evacuación intestinal  Siéntese en el inodoro al menos por 10 minutos, incluso si usted no tiene kalen evacuación intestinal     Hable con rincon médico sobre estos medicamentos  Ciertos medicamentos, elvis los opioides, pueden causar estreñimiento  Es posible que rincon proveedor pueda hacer cambios [employer]  Por ejemplo, puede cambiar el tipo de medicamento o cambiar el momento en que usted lo balbina  Acuda a la consulta de control con rincon médico según las indicaciones: Anote kely preguntas para que se acuerde de hacerlas karen kely visitas  © Copyright [place] [employer] 2022 Information is for End User's use only and may not be sold, redistributed or otherwise used for commercial purposes   All illustrations and images included in CareNotes® are the copyrighted property of A D A M , Inc  or [employer] [address]  Esta información es sólo para uso en educación  Rincon intención no es darle un consejo médico sobre enfermedades o tratamientos  Colsulte con rincon Sobeida Seals farmacéutico antes de seguir cualquier régimen médico para saber si es seguro y efectivo para usted

## 2022-10-19 NOTE — ASSESSMENT & PLAN NOTE
HPI:   Marily Olszewski is a 67year old male who presents for a Medicare Initial Annual Wellness visit (Once after 12 month Medicare anniversary) .     Patient is a 79-year-old man who presents today for annual Medicare physical.  Patient has a history of hi Lab Results   Component Value Date    HGBA1C 6 4 06/27/2022   Patient's last hemoglobin A1c in June was 6 4  He continues on his insulin and metformin  He does not check his blood sugars at home    He continues to travel back and forth from here to [facility]  Patient Care Team: Patient Care Team:  Laura Don MD as PCP - General (Family Medicine)    Patient Active Problem List:     Benign neoplasm of cranial nerves [facility])     Malignant neoplasm of prostate [facility])     Sensory hearing loss, bilateral kg).  HEENT tympanic membranes are intact neck is supple no nodes  Lungs clear to auscultation and percussion no crackles no rales no wheezing  Heart regular rate and rhythm no murmur  Abdomen soft nontender nondistended no masses no hepatosplenomegaly  Ex patient has clamp around penis to prevent leakage of urine given to him by urologist.     Vaccination History     Immunization History   Administered Date(s) Administered   • Fluvirin, 3 Years & >, Im 11/10/2016   • Pneumococcal (Prevnar 13) 12/17/2014   • LDL Annually LDL Cholesterol (mg/dL)   Date Value   08/19/2020 28        EKG - w/ Initial Preventative Physical Exam only, or if medically necessary Electrocardiogram date08/19/2020    Colorectal Cancer Screening      Colonoscopy Screen every 10 years Ther
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
## 2022-10-19 NOTE — ASSESSMENT & PLAN NOTE
Last echocardiogram was in 2018  His last provider did order a repeat echo which was not done  I did order this for the patient

## 2022-10-22 DIAGNOSIS — E11.9 TYPE 2 DIABETES MELLITUS WITHOUT COMPLICATION, WITHOUT LONG-TERM CURRENT USE OF INSULIN (HCC): ICD-10-CM

## 2022-10-24 RX ORDER — METFORMIN HYDROCHLORIDE 500 MG/1
TABLET, EXTENDED RELEASE ORAL
Qty: 180 TABLET | Refills: 1 | Status: SHIPPED | OUTPATIENT
Start: 2022-10-24

## 2022-11-01 DIAGNOSIS — E78.5 DYSLIPIDEMIA: ICD-10-CM

## 2022-11-01 DIAGNOSIS — N18.2 STAGE 2 CHRONIC KIDNEY DISEASE: ICD-10-CM

## 2022-11-01 RX ORDER — LOSARTAN POTASSIUM 25 MG/1
25 TABLET ORAL DAILY
Qty: 90 TABLET | Refills: 1 | Status: SHIPPED | OUTPATIENT
Start: 2022-11-01

## 2022-11-01 RX ORDER — ATORVASTATIN CALCIUM 20 MG/1
20 TABLET, FILM COATED ORAL
Qty: 90 TABLET | Refills: 1 | Status: SHIPPED | OUTPATIENT
Start: 2022-11-01

## 2022-11-02 ENCOUNTER — TELEPHONE (OUTPATIENT)
Dept: FAMILY MEDICINE CLINIC | Facility: CLINIC | Age: 77
End: 2022-11-02

## 2022-11-02 NOTE — TELEPHONE ENCOUNTER
Jennifer Broussard 55 ([phone]) called to report that pre-op clearance was received  States that doctor had ordered an echo and that they would need report from echo as well  Request report be sent to Fax#: [phone]  Requests call back with any further questions  Patient's appointment is scheduled for Monday 11/7/22

## 2022-11-03 NOTE — TELEPHONE ENCOUNTER
Received call back from [facility] ([phone]) asking if echo report could still be sent per Anesthesiologist's request

## 2022-11-03 NOTE — TELEPHONE ENCOUNTER
Jennifer with [facility] ([phone])  called back in regards to request for echo results  I relayed message from provider that echo was for surveillance and not needed for surgical clearance

## 2022-11-09 DIAGNOSIS — G31.84 MILD COGNITIVE IMPAIRMENT: ICD-10-CM

## 2022-11-09 DIAGNOSIS — E78.2 MIXED HYPERLIPIDEMIA: Primary | ICD-10-CM

## 2022-11-09 DIAGNOSIS — F32.1 MODERATE MAJOR DEPRESSION (HCC): ICD-10-CM

## 2022-11-10 RX ORDER — ESCITALOPRAM OXALATE 5 MG/1
TABLET ORAL
Qty: 90 TABLET | Refills: 1 | Status: SHIPPED | OUTPATIENT
Start: 2022-11-10

## 2022-11-10 RX ORDER — DONEPEZIL HYDROCHLORIDE 5 MG/1
TABLET, FILM COATED ORAL
Qty: 90 TABLET | Refills: 1 | Status: SHIPPED | OUTPATIENT
Start: 2022-11-10

## 2023-02-03 ENCOUNTER — RA CDI HCC (OUTPATIENT)
Dept: OTHER | Facility: HOSPITAL | Age: 78
End: 2023-02-03

## 2023-02-03 NOTE — PROGRESS NOTES
[facility]  coding opportunities       Chart reviewed, no opportunity found:   [address]        Patients Insurance     Medicare Insurance: [facility]

## 2023-02-07 ENCOUNTER — OFFICE VISIT (OUTPATIENT)
Dept: FAMILY MEDICINE CLINIC | Facility: CLINIC | Age: 78
End: 2023-02-07

## 2023-02-07 VITALS
HEIGHT: 64 IN | WEIGHT: 141.8 LBS | HEART RATE: 75 BPM | DIASTOLIC BLOOD PRESSURE: 64 MMHG | OXYGEN SATURATION: 96 % | SYSTOLIC BLOOD PRESSURE: 136 MMHG | TEMPERATURE: 97.9 F | RESPIRATION RATE: 16 BRPM | BODY MASS INDEX: 24.21 KG/M2

## 2023-02-07 DIAGNOSIS — I10 ESSENTIAL HYPERTENSION: ICD-10-CM

## 2023-02-07 DIAGNOSIS — H61.21 IMPACTED CERUMEN, RIGHT EAR: ICD-10-CM

## 2023-02-07 DIAGNOSIS — I35.0 NONRHEUMATIC AORTIC VALVE STENOSIS: ICD-10-CM

## 2023-02-07 DIAGNOSIS — F32.1 MODERATE MAJOR DEPRESSION (HCC): ICD-10-CM

## 2023-02-07 DIAGNOSIS — Z86.79 HISTORY OF ATRIAL FIBRILLATION: ICD-10-CM

## 2023-02-07 DIAGNOSIS — R63.4 WEIGHT LOSS: Primary | ICD-10-CM

## 2023-02-07 DIAGNOSIS — N18.1 CHRONIC KIDNEY DISEASE (CKD) STAGE G1/A2, GLOMERULAR FILTRATION RATE (GFR) EQUAL TO OR GREATER THAN 90 ML/MIN/1.73 SQUARE METER AND ALBUMINURIA CREATININE RATIO BETWEEN 30-299 MG/G: ICD-10-CM

## 2023-02-07 DIAGNOSIS — E11.9 TYPE 2 DIABETES MELLITUS WITHOUT COMPLICATION, WITHOUT LONG-TERM CURRENT USE OF INSULIN (HCC): ICD-10-CM

## 2023-02-07 DIAGNOSIS — G91.2 NORMAL PRESSURE HYDROCEPHALUS (HCC): ICD-10-CM

## 2023-02-07 DIAGNOSIS — E78.2 MIXED HYPERLIPIDEMIA: ICD-10-CM

## 2023-02-07 RX ORDER — OFLOXACIN 3 MG/ML
SOLUTION/ DROPS OPHTHALMIC
COMMUNITY
Start: 2023-01-23

## 2023-02-07 RX ORDER — PREDNISOLONE ACETATE 10 MG/ML
SUSPENSION/ DROPS OPHTHALMIC
COMMUNITY
Start: 2023-01-23

## 2023-02-07 RX ORDER — BLOOD SUGAR DIAGNOSTIC
STRIP MISCELLANEOUS
Qty: 200 EACH | Refills: 2 | Status: SHIPPED | OUTPATIENT
Start: 2023-02-07

## 2023-02-07 RX ORDER — KETOROLAC TROMETHAMINE 5 MG/ML
SOLUTION OPHTHALMIC
COMMUNITY
Start: 2023-01-23

## 2023-02-07 NOTE — ASSESSMENT & PLAN NOTE
Lab Results   Component Value Date    EGFR 56 03/22/2022    EGFR 69 10/19/2020    EGFR 68 12/13/2018    CREATININE 1 22 03/22/2022    CREATININE 1 05 10/19/2020    CREATININE 1 07 12/13/2018   Blood work due

## 2023-02-07 NOTE — ASSESSMENT & PLAN NOTE
Lab Results   Component Value Date    HGBA1C 6 4 06/27/2022   Due for A1c  Checks blood sugars at home  Continues with insulin, metformin   Due for eye exam after cataract surgery
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
## 2023-02-09 ENCOUNTER — TELEPHONE (OUTPATIENT)
Dept: FAMILY MEDICINE CLINIC | Facility: CLINIC | Age: 78
End: 2023-02-09

## 2023-02-09 ENCOUNTER — APPOINTMENT (OUTPATIENT)
Dept: LAB | Facility: CLINIC | Age: 78
End: 2023-02-09

## 2023-02-09 DIAGNOSIS — I10 ESSENTIAL HYPERTENSION: ICD-10-CM

## 2023-02-09 DIAGNOSIS — E11.9 TYPE 2 DIABETES MELLITUS WITHOUT COMPLICATION, WITHOUT LONG-TERM CURRENT USE OF INSULIN (HCC): ICD-10-CM

## 2023-02-09 DIAGNOSIS — N18.2 STAGE 2 CHRONIC KIDNEY DISEASE: ICD-10-CM

## 2023-02-09 DIAGNOSIS — R63.4 WEIGHT LOSS: ICD-10-CM

## 2023-02-09 DIAGNOSIS — E78.2 MIXED HYPERLIPIDEMIA: ICD-10-CM

## 2023-02-09 LAB
ANION GAP SERPL CALCULATED.3IONS-SCNC: 6 MMOL/L (ref 4–13)
BASOPHILS # BLD AUTO: 0.05 THOUSANDS/ÂΜL (ref 0–0.1)
BASOPHILS NFR BLD AUTO: 1 % (ref 0–1)
BUN SERPL-MCNC: 17 MG/DL (ref 5–25)
CALCIUM SERPL-MCNC: 9.1 MG/DL (ref 8.3–10.1)
CHLORIDE SERPL-SCNC: 110 MMOL/L (ref 96–108)
CHOLEST SERPL-MCNC: 105 MG/DL
CO2 SERPL-SCNC: 27 MMOL/L (ref 21–32)
CREAT SERPL-MCNC: 0.87 MG/DL (ref 0.6–1.3)
CREAT UR-MCNC: 70.7 MG/DL
EOSINOPHIL # BLD AUTO: 0.21 THOUSAND/ÂΜL (ref 0–0.61)
EOSINOPHIL NFR BLD AUTO: 3 % (ref 0–6)
ERYTHROCYTE [DISTWIDTH] IN BLOOD BY AUTOMATED COUNT: 13.9 % (ref 11.6–15.1)
EST. AVERAGE GLUCOSE BLD GHB EST-MCNC: 140 MG/DL
GFR SERPL CREATININE-BSD FRML MDRD: 82 ML/MIN/1.73SQ M
GLUCOSE P FAST SERPL-MCNC: 86 MG/DL (ref 65–99)
HBA1C MFR BLD: 6.5 %
HCT VFR BLD AUTO: 42.9 % (ref 36.5–49.3)
HDLC SERPL-MCNC: 37 MG/DL
HGB BLD-MCNC: 13.5 G/DL (ref 12–17)
IMM GRANULOCYTES # BLD AUTO: 0.02 THOUSAND/UL (ref 0–0.2)
IMM GRANULOCYTES NFR BLD AUTO: 0 % (ref 0–2)
LDLC SERPL CALC-MCNC: 46 MG/DL (ref 0–100)
LYMPHOCYTES # BLD AUTO: 1.19 THOUSANDS/ÂΜL (ref 0.6–4.47)
LYMPHOCYTES NFR BLD AUTO: 19 % (ref 14–44)
MCH RBC QN AUTO: 27.4 PG (ref 26.8–34.3)
MCHC RBC AUTO-ENTMCNC: 31.5 G/DL (ref 31.4–37.4)
MCV RBC AUTO: 87 FL (ref 82–98)
MICROALBUMIN UR-MCNC: 454 MG/L (ref 0–20)
MICROALBUMIN/CREAT 24H UR: 642 MG/G CREATININE (ref 0–30)
MONOCYTES # BLD AUTO: 0.83 THOUSAND/ÂΜL (ref 0.17–1.22)
MONOCYTES NFR BLD AUTO: 13 % (ref 4–12)
NEUTROPHILS # BLD AUTO: 4.11 THOUSANDS/ÂΜL (ref 1.85–7.62)
NEUTS SEG NFR BLD AUTO: 64 % (ref 43–75)
NRBC BLD AUTO-RTO: 0 /100 WBCS
PLATELET # BLD AUTO: 181 THOUSANDS/UL (ref 149–390)
PMV BLD AUTO: 9.7 FL (ref 8.9–12.7)
POTASSIUM SERPL-SCNC: 4.5 MMOL/L (ref 3.5–5.3)
RBC # BLD AUTO: 4.92 MILLION/UL (ref 3.88–5.62)
SODIUM SERPL-SCNC: 143 MMOL/L (ref 135–147)
TRIGL SERPL-MCNC: 112 MG/DL
TSH SERPL DL<=0.05 MIU/L-ACNC: 2.26 UIU/ML (ref 0.45–4.5)
WBC # BLD AUTO: 6.41 THOUSAND/UL (ref 4.31–10.16)

## 2023-02-09 NOTE — TELEPHONE ENCOUNTER
Patient's daughter dropped off the Medical Clearance form to be completed for 2/16/23 procedure - had his pre-op clearance appointment earlier this week  Form is in provider's bin-needs to be faxed by our office and she will  the original 2/14/23

## 2023-04-04 DIAGNOSIS — E11.9 TYPE 2 DIABETES MELLITUS WITHOUT COMPLICATION, WITHOUT LONG-TERM CURRENT USE OF INSULIN (HCC): ICD-10-CM

## 2023-04-04 DIAGNOSIS — E11.9 TYPE 2 DIABETES MELLITUS WITHOUT COMPLICATION, WITH LONG-TERM CURRENT USE OF INSULIN (HCC): ICD-10-CM

## 2023-04-04 DIAGNOSIS — Z79.4 TYPE 2 DIABETES MELLITUS WITHOUT COMPLICATION, WITH LONG-TERM CURRENT USE OF INSULIN (HCC): ICD-10-CM

## 2023-04-04 RX ORDER — INSULIN ASPART 100 [IU]/ML
INJECTION, SOLUTION INTRAVENOUS; SUBCUTANEOUS
Qty: 30 ML | Refills: 1 | Status: SHIPPED | OUTPATIENT
Start: 2023-04-04

## 2023-04-04 RX ORDER — INSULIN GLARGINE 100 [IU]/ML
INJECTION, SOLUTION SUBCUTANEOUS
Qty: 15 ML | Refills: 3 | Status: SHIPPED | OUTPATIENT
Start: 2023-04-04

## 2023-04-08 PROBLEM — H61.21 IMPACTED CERUMEN, RIGHT EAR: Status: RESOLVED | Noted: 2022-04-11 | Resolved: 2023-04-08

## 2023-05-05 DIAGNOSIS — G31.84 MILD COGNITIVE IMPAIRMENT: ICD-10-CM

## 2023-05-05 DIAGNOSIS — F32.1 MODERATE MAJOR DEPRESSION (HCC): ICD-10-CM

## 2023-05-05 RX ORDER — ESCITALOPRAM OXALATE 5 MG/1
TABLET ORAL
Qty: 90 TABLET | Refills: 1 | Status: SHIPPED | OUTPATIENT
Start: 2023-05-05

## 2023-05-05 RX ORDER — DONEPEZIL HYDROCHLORIDE 5 MG/1
TABLET, FILM COATED ORAL
Qty: 90 TABLET | Refills: 1 | Status: SHIPPED | OUTPATIENT
Start: 2023-05-05

## 2023-06-26 DIAGNOSIS — E11.9 TYPE 2 DIABETES MELLITUS WITHOUT COMPLICATION, WITHOUT LONG-TERM CURRENT USE OF INSULIN (HCC): ICD-10-CM

## 2023-06-26 RX ORDER — BLOOD SUGAR DIAGNOSTIC
STRIP MISCELLANEOUS
Qty: 200 EACH | Refills: 2 | Status: SHIPPED | OUTPATIENT
Start: 2023-06-26

## 2023-06-30 ENCOUNTER — OFFICE VISIT (OUTPATIENT)
Dept: FAMILY MEDICINE CLINIC | Facility: CLINIC | Age: 78
End: 2023-06-30
Payer: COMMERCIAL

## 2023-06-30 VITALS
BODY MASS INDEX: 24.11 KG/M2 | WEIGHT: 141.2 LBS | TEMPERATURE: 97.3 F | HEIGHT: 64 IN | SYSTOLIC BLOOD PRESSURE: 122 MMHG | HEART RATE: 67 BPM | DIASTOLIC BLOOD PRESSURE: 64 MMHG | RESPIRATION RATE: 16 BRPM | OXYGEN SATURATION: 98 %

## 2023-06-30 DIAGNOSIS — H61.21 IMPACTED CERUMEN OF RIGHT EAR: Primary | ICD-10-CM

## 2023-06-30 PROCEDURE — 99213 OFFICE O/P EST LOW 20 MIN: CPT | Performed by: NURSE PRACTITIONER

## 2023-06-30 NOTE — PROGRESS NOTES
[facility]ACT[facility]    NAME: Renard Antoine  AGE: 65 y o  SEX: male  : 1945     DATE: 2023     Assessment and Plan:     Problem List Items Addressed This Visit        Nervous and Auditory    Impacted cerumen of right ear - Primary     The patient presents to the office today for wax in his ear  Attempted to remove this wax were unsuccessful  I was able to irrigate a small amount of impacted cerumen from his ear however the wax is deep in his ear canal and appears to be impacted  I have referred the patient to ENT  Relevant Orders    Ambulatory Referral to Otolaryngology    Ear cerumen removal (Completed)           No follow-ups on file  Chief Complaint:     Chief Complaint   Patient presents with   • Cerumen Impaction     Ear flush         History of Present Illness: The patient presents to the office today for irrigation of impacted cerumen in the right ear  This is discussed above  Referred to ENT  Incomplete removal   He does have retained cerumen deep in the ear canal      Review of Systems:     Review of Systems   Constitutional: Negative  Negative for fatigue  HENT: Positive for hearing loss (impacted wax right ear)  Negative for congestion, postnasal drip, rhinorrhea and trouble swallowing  Eyes: Negative  Negative for visual disturbance  Respiratory: Negative  Negative for choking and shortness of breath  Cardiovascular: Negative  Negative for chest pain  Gastrointestinal: Negative  Endocrine: Negative  Genitourinary: Negative  Musculoskeletal: Negative  Negative for arthralgias, back pain, myalgias and neck pain  Skin: Negative  Neurological: Negative for dizziness and headaches  Psychiatric/Behavioral: Negative           Problem List:     Patient Active Problem List   Diagnosis   • Normal pressure hydrocephalus [facility])   • Essential hypertension   • Type 2 diabetes mellitus ([facility] )   • Aortic "stenosis   • Aortic prosthetic valve regurgitation   • Coronary artery disease   • H/O aortic valve replacement   • Dysphagia   • Myofascial pain syndrome, cervical   • Chronic kidney disease (CKD) stage G1/A2, glomerular filtration rate (GFR) equal to or greater than 90 mL/min/1 73 square meter and albuminuria creatinine ratio between  mg/g   • Screening PSA (prostate specific antigen)   • Presbycusis of both ears   • Chronic bilateral low back pain without sciatica   • Mixed hyperlipidemia   • Stage 2 chronic kidney disease   • Mild cognitive impairment   • History of atrial fibrillation   • 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HCC)   • Neck pain   • Impacted cerumen of right ear   • Viral infection   • Primary osteoarthritis of both knees   • Cardiac murmur        Objective:     /64   Pulse 67   Temp (!) 97 3 °F (36 3 °C) (Tympanic)   Resp 16   Ht 5' 3 5\" (1 613 m)   Wt 64 kg (141 lb 3 2 oz)   SpO2 98%   BMI 24 62 kg/m²     Current Outpatient Medications   Medication Sig Dispense Refill   • Accu-Chek Sophia Plus test strip USE 1 EACH 4 (FOUR) TIMES A DAY TEST BLOOD SUGAR 4 TIMES 200 each 2   • Accu-Chek FastClix Lancets MISC Inject into the skin 3 (three) times a day Test 204 each 3   • atorvastatin (LIPITOR) 20 mg tablet TAKE 1 TABLET (20 MG TOTAL) BY MOUTH DAILY AT BEDTIME 90 tablet 1   • carbamide peroxide (DEBROX) 6 5 % otic solution Administer 5 drops to the right ear 2 (two) times a day 15 mL 0   • donepezil (ARICEPT) 5 mg tablet TAKE 1 TABLET (5 MG TOTAL) BY MOUTH DAILY AT BEDTIME 90 tablet 1   • escitalopram (LEXAPRO) 5 mg tablet TAKE 1 TABLET (5 MG TOTAL) BY MOUTH DAILY 90 tablet 1   • Insulin Pen Needle (Comfort EZ Pen Needles) 32G X 5 MM MISC Inject into the skin 4 (four) times a day 200 each 2   • ketorolac (ACULAR) 0 5 % ophthalmic solution INSTILL 1 DROP 4 TIMES A DAY INTO SURGERY EYE  START 3 DAYS PRIOR TO SURGERY       • Lantus SoloStar 100 units/mL SOPN INJECT 12 UNITS AT BEDTIME 15 mL 3 " • losartan (COZAAR) 25 mg tablet TAKE 1 TABLET (25 MG TOTAL) BY MOUTH DAILY 90 tablet 1   • Melatonin-Pyridoxine (MELATIN PO) Take 8 mg by mouth daily at bedtime     • meloxicam (Mobic) 7 5 mg tablet Take 1 tablet (7 5 mg total) by mouth daily 30 tablet 2   • metFORMIN (GLUCOPHAGE-XR) 500 mg 24 hr tablet TAKE 1 TABLET TWICE A DAY WITH MEALS AS DIRECTED 180 tablet 1   • NovoLOG FlexPen 100 units/mL injection pen INJECT 12 UNITS BEFORE BREAKFAST, 8 UNITS BEFORE LUNCH AND 8 UNITS BEFORE SUPPER FOR DIABETES 30 mL 1   • ofloxacin (OCUFLOX) 0 3 % ophthalmic solution INSTILL 1 DROP INTO SURGERY EYE 4 TIMES A DAY  START 3 DAYS BEFORE SURGERY     • prednisoLONE acetate (PRED FORTE) 1 % ophthalmic suspension INSTILL 1 DROP 4 TIMES A DAY IN SURGERY EYE TO START DAY OF SURGERY  AFTER SURGERY  No current facility-administered medications for this visit  Physical Exam  Vitals reviewed  Constitutional:       Appearance: Normal appearance  HENT:      Head: Normocephalic and atraumatic  Right Ear: There is impacted cerumen  Nose: Nose normal       Mouth/Throat:      Mouth: Mucous membranes are moist    Eyes:      Extraocular Movements: Extraocular movements intact  Pupils: Pupils are equal, round, and reactive to light  Cardiovascular:      Rate and Rhythm: Normal rate and regular rhythm  Pulses: Normal pulses  Heart sounds: Normal heart sounds  Pulmonary:      Effort: Pulmonary effort is normal       Breath sounds: Normal breath sounds  Musculoskeletal:         General: Normal range of motion  Skin:     General: Skin is warm  Neurological:      General: No focal deficit present  Mental Status: He is alert and oriented to person, place, and time  Psychiatric:         Mood and Affect: Mood normal          Behavior: Behavior normal          Thought Content:  Thought content normal          Judgment: Judgment normal        Ear cerumen removal    Date/Time: 6/30/2023 9:40 AM    Performed by: MICHEL Kemp  Authorized by: MICHEL Kemp  Universal Protocol:  Consent: Verbal consent obtained  Consent given by: patient  Patient understanding: patient states understanding of the procedure being performed  Patient identity confirmed: verbally with patient      Patient location:  Clinic  Procedure details:     Local anesthetic:  None    Location:  R ear    Procedure type: irrigation with instrumentation      Instrumentation: curette      Approach:  External  Post-procedure details:     Complication:  None  Comments:      Small amount of impacted cerumen irrigated from ear   Impacted cerumen remains deep in ear canal  Refer to ENT      Mike Sosa, [address] [facility]

## 2023-06-30 NOTE — ASSESSMENT & PLAN NOTE
The patient presents to the office today for wax in his ear  Attempted to remove this wax were unsuccessful  I was able to irrigate a small amount of impacted cerumen from his ear however the wax is deep in his ear canal and appears to be impacted  I have referred the patient to ENT

## 2023-07-14 ENCOUNTER — HOSPITAL ENCOUNTER (OUTPATIENT)
Dept: RADIOLOGY | Facility: HOSPITAL | Age: 78
Discharge: HOME | End: 2023-07-14
Attending: STUDENT IN AN ORGANIZED HEALTH CARE EDUCATION/TRAINING PROGRAM
Payer: COMMERCIAL

## 2023-07-14 DIAGNOSIS — R13.10 DYSPHAGIA, UNSPECIFIED TYPE: ICD-10-CM

## 2023-07-14 PROCEDURE — 92611 MOTION FLUOROSCOPY/SWALLOW: CPT

## 2023-07-14 PROCEDURE — 74230 X-RAY XM SWLNG FUNCJ C+: CPT
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
## 2023-08-03 ENCOUNTER — APPOINTMENT (EMERGENCY)
Dept: RADIOLOGY | Facility: HOSPITAL | Age: 78
End: 2023-08-03
Payer: COMMERCIAL

## 2023-08-03 ENCOUNTER — HOSPITAL ENCOUNTER (EMERGENCY)
Facility: HOSPITAL | Age: 78
Discharge: HOME | End: 2023-08-03
Attending: EMERGENCY MEDICINE
Payer: COMMERCIAL

## 2023-08-03 VITALS
OXYGEN SATURATION: 96 % | TEMPERATURE: 98.1 F | RESPIRATION RATE: 16 BRPM | DIASTOLIC BLOOD PRESSURE: 75 MMHG | HEART RATE: 72 BPM | SYSTOLIC BLOOD PRESSURE: 160 MMHG

## 2023-08-03 DIAGNOSIS — S16.1XXA ACUTE STRAIN OF NECK MUSCLE, INITIAL ENCOUNTER: Primary | ICD-10-CM

## 2023-08-03 LAB
ALBUMIN SERPL BCP-MCNC: 3.4 G/DL (ref 3.5–5)
ALP SERPL-CCNC: 141 U/L (ref 46–116)
ALT SERPL W P-5'-P-CCNC: 52 U/L (ref 12–78)
ANION GAP SERPL CALCULATED.3IONS-SCNC: 5 MMOL/L
AST SERPL W P-5'-P-CCNC: 52 U/L (ref 5–45)
BASOPHILS # BLD AUTO: 0.04 THOUSANDS/ÂΜL (ref 0–0.1)
BASOPHILS NFR BLD AUTO: 1 % (ref 0–1)
BILIRUB SERPL-MCNC: 0.51 MG/DL (ref 0.2–1)
BUN SERPL-MCNC: 14 MG/DL (ref 5–25)
CALCIUM ALBUM COR SERPL-MCNC: 9.8 MG/DL (ref 8.3–10.1)
CALCIUM SERPL-MCNC: 9.3 MG/DL (ref 8.3–10.1)
CHLORIDE SERPL-SCNC: 110 MMOL/L (ref 96–108)
CO2 SERPL-SCNC: 26 MMOL/L (ref 21–32)
CREAT SERPL-MCNC: 0.98 MG/DL (ref 0.6–1.3)
EOSINOPHIL # BLD AUTO: 0.33 THOUSAND/ÂΜL (ref 0–0.61)
EOSINOPHIL NFR BLD AUTO: 5 % (ref 0–6)
ERYTHROCYTE [DISTWIDTH] IN BLOOD BY AUTOMATED COUNT: 14.5 % (ref 11.6–15.1)
GFR SERPL CREATININE-BSD FRML MDRD: 73 ML/MIN/1.73SQ M
GLUCOSE SERPL-MCNC: 134 MG/DL (ref 65–140)
HCT VFR BLD AUTO: 39.8 % (ref 36.5–49.3)
HGB BLD-MCNC: 12.5 G/DL (ref 12–17)
IMM GRANULOCYTES # BLD AUTO: 0.02 THOUSAND/UL (ref 0–0.2)
IMM GRANULOCYTES NFR BLD AUTO: 0 % (ref 0–2)
LYMPHOCYTES # BLD AUTO: 0.91 THOUSANDS/ÂΜL (ref 0.6–4.47)
LYMPHOCYTES NFR BLD AUTO: 13 % (ref 14–44)
MCH RBC QN AUTO: 27.3 PG (ref 26.8–34.3)
MCHC RBC AUTO-ENTMCNC: 31.4 G/DL (ref 31.4–37.4)
MCV RBC AUTO: 87 FL (ref 82–98)
MONOCYTES # BLD AUTO: 0.98 THOUSAND/ÂΜL (ref 0.17–1.22)
MONOCYTES NFR BLD AUTO: 14 % (ref 4–12)
NEUTROPHILS # BLD AUTO: 4.54 THOUSANDS/ÂΜL (ref 1.85–7.62)
NEUTS SEG NFR BLD AUTO: 67 % (ref 43–75)
NRBC BLD AUTO-RTO: 0 /100 WBCS
PLATELET # BLD AUTO: 188 THOUSANDS/UL (ref 149–390)
PMV BLD AUTO: 10.3 FL (ref 8.9–12.7)
POTASSIUM SERPL-SCNC: 4 MMOL/L (ref 3.5–5.3)
PROT SERPL-MCNC: 8.4 G/DL (ref 6.4–8.4)
RBC # BLD AUTO: 4.58 MILLION/UL (ref 3.88–5.62)
SODIUM SERPL-SCNC: 141 MMOL/L (ref 135–147)
WBC # BLD AUTO: 6.82 THOUSAND/UL (ref 4.31–10.16)

## 2023-08-03 PROCEDURE — 74018 RADEX ABDOMEN 1 VIEW: CPT

## 2023-08-03 PROCEDURE — 71046 X-RAY EXAM CHEST 2 VIEWS: CPT

## 2023-08-03 PROCEDURE — 80053 COMPREHEN METABOLIC PANEL: CPT | Performed by: PHYSICIAN ASSISTANT

## 2023-08-03 PROCEDURE — 70250 X-RAY EXAM OF SKULL: CPT

## 2023-08-03 PROCEDURE — 85025 COMPLETE CBC W/AUTO DIFF WBC: CPT | Performed by: PHYSICIAN ASSISTANT

## 2023-08-03 PROCEDURE — 36415 COLL VENOUS BLD VENIPUNCTURE: CPT | Performed by: PHYSICIAN ASSISTANT

## 2023-08-03 RX ORDER — METHOCARBAMOL 500 MG/1
500 TABLET, FILM COATED ORAL ONCE
Status: COMPLETED | OUTPATIENT
Start: 2023-08-03 | End: 2023-08-03

## 2023-08-03 RX ORDER — METHOCARBAMOL 500 MG/1
500 TABLET, FILM COATED ORAL 2 TIMES DAILY
Qty: 20 TABLET | Refills: 0 | Status: SHIPPED | OUTPATIENT
Start: 2023-08-03

## 2023-08-03 RX ORDER — LIDOCAINE 50 MG/G
1 PATCH TOPICAL ONCE
Status: DISCONTINUED | OUTPATIENT
Start: 2023-08-03 | End: 2023-08-03 | Stop reason: HOSPADM

## 2023-08-03 RX ADMIN — LIDOCAINE 5% 1 PATCH: 700 PATCH TOPICAL at 11:21

## 2023-08-03 RX ADMIN — METHOCARBAMOL 500 MG: 500 TABLET ORAL at 11:20
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
## 2023-08-04 ENCOUNTER — TELEPHONE (OUTPATIENT)
Dept: PHYSICAL THERAPY | Facility: OTHER | Age: 78
End: 2023-08-04

## 2023-08-04 NOTE — TELEPHONE ENCOUNTER
Nurse reached out to discuss recent referral entered for  Comprehensive Spine program d/t neck strain dx. Patients daughter Tone Figueroa answered the call. This RN reviewed the reason for the call, triage, and referral process with her. Daughter will discuss with her father and CB when available or if needed. Nurse provided her with  [facility] contact info, hours of operation and reminded to allow enough time to complete the triage/referral entry. Nurse quickly reviewed the patients ED AVS with Damaris and confirmed the department ([facility]) speaking with her now. Reminded the 2500 number is the "direct" as AVS included the 800/prompt instructions. Daughter  Thanked nurse for calling, explaining and clarification. Nurse informed her the chart would be noted and encouraged to CB when able. She is aware the patient needs to be present. Referral closed and will await possible CB from patient/family.

## 2023-08-07 ENCOUNTER — TELEPHONE (OUTPATIENT)
Dept: FAMILY MEDICINE CLINIC | Facility: CLINIC | Age: 78
End: 2023-08-07

## 2023-08-07 NOTE — TELEPHONE ENCOUNTER
Pt daughter will call back tomorrow to schedule appt----- Message from [address] sent at 8/7/2023 11:41 AM EDT -----  Please call patient to schedule Annual Medicare Wellness visit and follow up.     Thanks,    Faustino Johnson

## 2023-08-08 LAB
LEFT EYE DIABETIC RETINOPATHY: POSITIVE
RIGHT EYE DIABETIC RETINOPATHY: NORMAL

## 2023-08-15 ENCOUNTER — OFFICE VISIT (OUTPATIENT)
Dept: FAMILY MEDICINE CLINIC | Facility: CLINIC | Age: 78
End: 2023-08-15
Payer: COMMERCIAL

## 2023-08-15 VITALS
OXYGEN SATURATION: 99 % | HEIGHT: 64 IN | RESPIRATION RATE: 16 BRPM | SYSTOLIC BLOOD PRESSURE: 120 MMHG | TEMPERATURE: 97.6 F | WEIGHT: 140 LBS | HEART RATE: 64 BPM | BODY MASS INDEX: 23.9 KG/M2 | DIASTOLIC BLOOD PRESSURE: 70 MMHG

## 2023-08-15 DIAGNOSIS — E78.2 MIXED HYPERLIPIDEMIA: ICD-10-CM

## 2023-08-15 DIAGNOSIS — N18.1 CHRONIC KIDNEY DISEASE (CKD) STAGE G1/A2, GLOMERULAR FILTRATION RATE (GFR) EQUAL TO OR GREATER THAN 90 ML/MIN/1.73 SQUARE METER AND ALBUMINURIA CREATININE RATIO BETWEEN 30-299 MG/G: ICD-10-CM

## 2023-08-15 DIAGNOSIS — E11.9 TYPE 2 DIABETES MELLITUS WITHOUT COMPLICATION, WITHOUT LONG-TERM CURRENT USE OF INSULIN (HCC): Chronic | ICD-10-CM

## 2023-08-15 DIAGNOSIS — I25.10 CORONARY ARTERY DISEASE INVOLVING NATIVE CORONARY ARTERY OF NATIVE HEART WITHOUT ANGINA PECTORIS: ICD-10-CM

## 2023-08-15 DIAGNOSIS — I10 ESSENTIAL HYPERTENSION: ICD-10-CM

## 2023-08-15 DIAGNOSIS — S16.1XXA ACUTE STRAIN OF NECK MUSCLE, INITIAL ENCOUNTER: ICD-10-CM

## 2023-08-15 DIAGNOSIS — Z12.5 SCREENING FOR PROSTATE CANCER: ICD-10-CM

## 2023-08-15 DIAGNOSIS — I35.0 NONRHEUMATIC AORTIC VALVE STENOSIS: ICD-10-CM

## 2023-08-15 DIAGNOSIS — M54.2 NECK PAIN: ICD-10-CM

## 2023-08-15 DIAGNOSIS — R13.10 DYSPHAGIA, UNSPECIFIED TYPE: ICD-10-CM

## 2023-08-15 DIAGNOSIS — R01.1 CARDIAC MURMUR: ICD-10-CM

## 2023-08-15 DIAGNOSIS — Z00.00 MEDICARE ANNUAL WELLNESS VISIT, SUBSEQUENT: Primary | ICD-10-CM

## 2023-08-15 PROBLEM — H61.21 IMPACTED CERUMEN OF RIGHT EAR: Status: RESOLVED | Noted: 2022-04-11 | Resolved: 2023-08-15

## 2023-08-15 PROBLEM — I48.0 PAF (PAROXYSMAL ATRIAL FIBRILLATION) (HCC): Status: ACTIVE | Noted: 2023-08-15

## 2023-08-15 LAB — SL AMB POCT HEMOGLOBIN AIC: 6.3 (ref ?–6.5)

## 2023-08-15 PROCEDURE — 99214 OFFICE O/P EST MOD 30 MIN: CPT | Performed by: NURSE PRACTITIONER

## 2023-08-15 PROCEDURE — 83036 HEMOGLOBIN GLYCOSYLATED A1C: CPT | Performed by: NURSE PRACTITIONER

## 2023-08-15 PROCEDURE — G0439 PPPS, SUBSEQ VISIT: HCPCS | Performed by: NURSE PRACTITIONER

## 2023-08-15 RX ORDER — METHOCARBAMOL 500 MG/1
500 TABLET, FILM COATED ORAL 3 TIMES DAILY
Qty: 30 TABLET | Refills: 0 | Status: SHIPPED | OUTPATIENT
Start: 2023-08-15

## 2023-08-15 NOTE — ASSESSMENT & PLAN NOTE
Follows with cardiology but overdue for follow up. Echo ordered last October, patient has not scheduled.  Reprinted order for patient

## 2023-08-15 NOTE — ASSESSMENT & PLAN NOTE
Lab Results   Component Value Date    HGBA1C 6.3 08/15/2023   Well controlled at this time. Diabetic foot exam performed in the office today. Recently saw eye doctor and will obtain those results.  Continues with metformin and lantus

## 2023-08-15 NOTE — ASSESSMENT & PLAN NOTE
Lab Results   Component Value Date    EGFR 73 08/03/2023    EGFR 82 02/09/2023    EGFR 56 03/22/2022    CREATININE 0.98 08/03/2023    CREATININE 0.87 02/09/2023    CREATININE 1.22 03/22/2022   Stable at present time.  Follows with nephrology, overdue for follow up visit

## 2023-08-15 NOTE — ASSESSMENT & PLAN NOTE
Continue lipitor.  Continue low fat diet    Component      Latest Ref Rng 3/22/2022 2/9/2023   Cholesterol      See Comment mg/dL 117  105    Triglycerides      See Comment mg/dL 90  112    HDL      >=40 mg/dL 39 (L)  37 (L)    LDL Calculated      0 - 100 mg/dL 60  46       Legend:  (L) Low
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
## 2023-08-15 NOTE — ASSESSMENT & PLAN NOTE
The patient was evaluated in the emergency room last week for ongoing neck pain. The pain in located in the middle of his neck. It does not radiate. He does have a  shunt and a shunt series was performed and was normal. No cervical spine x ray performed. He had a cervical spine x ray in 2016 which showed degenerative changes. I will repeat at this time. I encouraged the patient to make an appointment with the comprehensive spine program. Neck exercised provided to the patient.  Continue robaxin prn

## 2023-08-15 NOTE — PATIENT INSTRUCTIONS
Team Member [employer] Contact Info Address Start End Comments   Kota Rico MD - Phone: [phone] Fax: [phone] [address] [place]  [place] [address] 1/23/2018 - -     Medicare Preventive Visit Patient Instructions  Thank you for completing your Welcome to Medicare Visit or Medicare Annual Wellness Visit today. Your next wellness visit will be due in one year (8/15/2024). The screening/preventive services that you may require over the next 5-10 years are detailed below. Some tests may not apply to you based off risk factors and/or age. Screening tests ordered at today's visit but not completed yet may show as past due. Also, please note that scanned in results may not display below. Preventive Screenings:  Service Recommendations Previous Testing/Comments   Colorectal Cancer Screening  Colonoscopy    Fecal Occult Blood Test (FOBT)/Fecal Immunochemical Test (FIT)  Fecal DNA/Cologuard Test  Flexible Sigmoidoscopy Age: 43-73 years old   Colonoscopy: every 10 years (May be performed more frequently if at higher risk)  OR  FOBT/FIT: every 1 year  OR  Cologuard: every 3 years  OR  Sigmoidoscopy: every 5 years  Screening may be recommended earlier than age 39 if at higher risk for colorectal cancer. Also, an individualized decision between you and your healthcare provider will decide whether screening between the ages of 77-80 would be appropriate.  Colonoscopy: 01/05/2015  FOBT/FIT: Not on file  Cologuard: Not on file  Sigmoidoscopy: Not on file    Screening Current     Prostate Cancer Screening Individualized decision between patient and health care provider in men between ages of 53-66   Medicare will cover every 12 months beginning on the day after your 50th birthday PSA: 0.5 ng/mL     Screening Not Indicated     Hepatitis C Screening Once for adults born between [address] [place] [address]  More frequently in patients at high risk for Hepatitis C Hep C Antibody: Not on file    Screening Current Diabetes Screening 1-2 times per year if you're at risk for diabetes or have pre-diabetes Fasting glucose: 86 mg/dL (2/9/2023)  A1C: 6.5 % (2/9/2023)  Screening Not Indicated  History Diabetes   Cholesterol Screening Once every 5 years if you don't have a lipid disorder. May order more often based on risk factors. Lipid panel: 02/09/2023  Screening Not Indicated  History Lipid Disorder      Other Preventive Screenings Covered by Medicare:  Abdominal Aortic Aneurysm (AAA) Screening: covered once if your at risk. You're considered to be at risk if you have a family history of AAA or a male between the age of 70-76 who smoking at least 100 cigarettes in your lifetime. Lung Cancer Screening: covers low dose CT scan once per year if you meet all of the following conditions: (1) Age 48-67; (2) No signs or symptoms of lung cancer; (3) Current smoker or have quit smoking within the last 15 years; (4) You have a tobacco smoking history of at least 20 pack years (packs per day x number of years you smoked); (5) You get a written order from a healthcare provider. Glaucoma Screening: covered annually if you're considered high risk: (1) You have diabetes OR (2) Family history of glaucoma OR (3)  aged 48 and older OR (3)  American aged 72 and older  Osteoporosis Screening: covered every 2 years if you meet one of the following conditions: (1) Have a vertebral abnormality; (2) On glucocorticoid therapy for more than 3 months; (3) Have primary hyperparathyroidism; (4) On osteoporosis medications and need to assess response to drug therapy. HIV Screening: covered annually if you're between the age of 14-79. Also covered annually if you are younger than 13 and older than 72 with risk factors for HIV infection. For pregnant patients, it is covered up to 3 times per pregnancy.     Immunizations:  Immunization Recommendations   Influenza Vaccine Annual influenza vaccination during flu season is recommended for all persons aged >= 6 months who do not have contraindications   Pneumococcal Vaccine   * Pneumococcal conjugate vaccine = PCV13 (Prevnar 13), PCV15 (Vaxneuvance), PCV20 (Prevnar 20)  * Pneumococcal polysaccharide vaccine = PPSV23 (Pneumovax) Adults 2364 years old: 1-3 doses may be recommended based on certain risk factors  Adults 72 years old: 1-2 doses may be recommended based off what pneumonia vaccine you previously received   Hepatitis B Vaccine 3 dose series if at intermediate or high risk (ex: diabetes, end stage renal disease, liver disease)   Tetanus (Td) Vaccine - COST NOT COVERED BY MEDICARE PART B Following completion of primary series, a booster dose should be given every 10 years to maintain immunity against tetanus. Td may also be given as tetanus wound prophylaxis. Tdap Vaccine - COST NOT COVERED BY MEDICARE PART B Recommended at least once for all adults. For pregnant patients, recommended with each pregnancy. Shingles Vaccine (Shingrix) - COST NOT COVERED BY MEDICARE PART B  2 shot series recommended in those aged 48 and above     Health Maintenance Due:      Topic Date Due    Hepatitis C Screening  11/19/2024 (Originally 1945)    Colorectal Cancer Screening  Discontinued     Immunizations Due:      Topic Date Due    COVID-19 Vaccine (4 - Pfizer series) 12/01/2021    Influenza Vaccine (1) 09/01/2023     Advance Directives   What are advance directives? Advance directives are legal documents that state your wishes and plans for medical care. These plans are made ahead of time in case you lose your ability to make decisions for yourself. Advance directives can apply to any medical decision, such as the treatments you want, and if you want to donate organs. What are the types of advance directives? There are many types of advance directives, and each state has rules about how to use them. You may choose a combination of any of the following:  Living will:   This is a written record of the treatment you want. You can also choose which treatments you do not want, which to limit, and which to stop at a certain time. This includes surgery, medicine, IV fluid, and tube feedings. Durable power of  for [facility]): This is a written record that states who you want to make healthcare choices for you when you are unable to make them for yourself. This person, called a proxy, is usually a family member or a friend. You may choose more than 1 proxy. Do not resuscitate (DNR) order:  A DNR order is used in case your heart stops beating or you stop breathing. It is a request not to have certain forms of treatment, such as CPR. A DNR order may be included in other types of advance directives. Medical directive: This covers the care that you want if you are in a coma, near death, or unable to make decisions for yourself. You can list the treatments you want for each condition. Treatment may include pain medicine, surgery, blood transfusions, dialysis, IV or tube feedings, and a ventilator (breathing machine). Values history: This document has questions about your views, beliefs, and how you feel and think about life. This information can help others choose the care that you would choose. Why are advance directives important? An advance directive helps you control your care. Although spoken wishes may be used, it is better to have your wishes written down. Spoken wishes can be misunderstood, or not followed. Treatments may be given even if you do not want them. An advance directive may make it easier for your family to make difficult choices about your care. Fall Prevention    Fall prevention  includes ways to make your home and other areas safer. It also includes ways you can move more carefully to prevent a fall. Health conditions that cause changes in your blood pressure, vision, or muscle strength and coordination may increase your risk for falls.  Medicines may also increase your risk for falls if they make you dizzy, weak, or sleepy. Fall prevention tips:   Stand or sit up slowly. Use assistive devices as directed. Wear shoes that fit well and have soles that . Wear a personal alarm. Stay active. Manage your medical conditions. Home Safety Tips:  Add items to prevent falls in the bathroom. Keep paths clear. Install bright lights in your home. Keep items you use often on shelves within reach. Paint or place reflective tape on the edges of your stairs. © Copyright [employer] 2018 Information is for End User's use only and may not be sold, redistributed or otherwise used for commercial purposes. All illustrations and images included in CareNotes® are the copyrighted property of [employer]. or [employer] [facility]      Neck Exercises   AMBULATORY CARE:   Neck exercises  help reduce neck pain, and improve neck movement and strength. Neck exercises also help prevent long-term neck problems. Call your doctor if:   Your pain does not get better, or gets worse. You have questions or concerns about your condition, care, or exercise program.    What you need to know about exercise safety:   Move slowly, gently, and smoothly. Avoid fast or jerky motions. Stand and sit the way your healthcare provider shows you. Good posture may reduce your neck pain. Check your posture often, even when you are not doing your neck exercises. Follow the exercise program recommended by your healthcare provider. He or she will tell you which exercises are best for your condition. He or she will also tell you how many repetitions to do and how often you should do the exercises. How to perform neck exercises safely:   Exercise position:  You may sit or stand while you do neck exercises. Face forward. Your shoulders should be straight and relaxed, with a good posture.          Head tilts, forward and back:  Gently bow your head and try to touch your chin to your chest. Your healthcare provider may tell you to push on the back of your neck to help bow your head. Raise your chin back to the starting position. Tilt your head back as far as possible so you are looking up at the ceiling. Your healthcare provider may tell you to lift your chin to help tilt your head back. Return your head to the starting position. Head tilts, side to side:  Tilt your head, bringing your ear toward your shoulder. Then tilt your head toward the other shoulder. Head turns:  Turn your head to look over your shoulder. Tilt your chin down and try to touch it to your shoulder. Do not raise your shoulder to your chin. Face forward again. Do the same on the other side. Head rolls:  Slowly bring your chin toward your chest. Next, roll your head to the right. Your ear should be positioned over your shoulder. Hold this position for 5 seconds. Roll your head back toward your chest and to the left into the same position. Hold for 5 seconds. Gently roll your head back and around in a clockwise [place] 3 times. Next, move your head in the reverse direction (counterclockwise) in a [place] 3 times. Do not shrug your shoulders upwards while you do this exercise. Follow up with your doctor as directed:  Write down your questions so you remember to ask them during your visits. © Copyright [employer] 2022 Information is for End User's use only and may not be sold, redistributed or otherwise used for commercial purposes. The above information is an  only. It is not intended as medical advice for individual conditions or treatments. Talk to your doctor, nurse or pharmacist before following any medical regimen to see if it is safe and effective for you.

## 2023-08-17 ENCOUNTER — TELEPHONE (OUTPATIENT)
Dept: FAMILY MEDICINE CLINIC | Facility: CLINIC | Age: 78
End: 2023-08-17

## 2023-08-17 DIAGNOSIS — M54.2 NECK PAIN: Primary | ICD-10-CM

## 2023-08-17 LAB
CREAT ?TM UR-SCNC: 103.5 UMOL/L
EXT ALBUMIN URINE RANDOM: 169.6
HBA1C MFR BLD HPLC: 6.9 %
MICROALBUMIN/CREAT UR: 1638.6 MG/G{CREAT}

## 2023-08-25 ENCOUNTER — CONSULT (OUTPATIENT)
Dept: PAIN MEDICINE | Facility: CLINIC | Age: 78
End: 2023-08-25
Payer: COMMERCIAL

## 2023-08-25 VITALS
DIASTOLIC BLOOD PRESSURE: 72 MMHG | HEIGHT: 63 IN | SYSTOLIC BLOOD PRESSURE: 132 MMHG | WEIGHT: 137 LBS | BODY MASS INDEX: 24.27 KG/M2

## 2023-08-25 DIAGNOSIS — M47.812 CERVICAL SPONDYLOSIS: Primary | ICD-10-CM

## 2023-08-25 DIAGNOSIS — M54.2 NECK PAIN: ICD-10-CM

## 2023-08-25 PROCEDURE — 99204 OFFICE O/P NEW MOD 45 MIN: CPT | Performed by: ANESTHESIOLOGY
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
## 2023-09-01 ENCOUNTER — HOSPITAL ENCOUNTER (OUTPATIENT)
Dept: NON INVASIVE DIAGNOSTICS | Facility: CLINIC | Age: 78
Discharge: HOME | End: 2023-09-01
Payer: COMMERCIAL

## 2023-09-01 VITALS
HEART RATE: 64 BPM | HEIGHT: 63 IN | DIASTOLIC BLOOD PRESSURE: 72 MMHG | WEIGHT: 137 LBS | BODY MASS INDEX: 24.27 KG/M2 | SYSTOLIC BLOOD PRESSURE: 132 MMHG

## 2023-09-01 DIAGNOSIS — I35.0 NONRHEUMATIC AORTIC VALVE STENOSIS: ICD-10-CM

## 2023-09-01 LAB
AORTIC ROOT: 3 CM
AORTIC VALVE MEAN VELOCITY: 12.9 M/S
APICAL FOUR CHAMBER EJECTION FRACTION: 66 %
ASCENDING AORTA: 2.9 CM
AV AREA BY CONTINUOUS VTI: 2 CM2
AV AREA PEAK VELOCITY: 1.8 CM2
AV LVOT MEAN GRADIENT: 3 MMHG
AV LVOT PEAK GRADIENT: 5 MMHG
AV MEAN GRADIENT: 8 MMHG
AV PEAK GRADIENT: 15 MMHG
AV VALVE AREA: 2.02 CM2
AV VELOCITY RATIO: 0.57
DOP CALC AO PEAK VEL: 1.93 M/S
DOP CALC AO VTI: 37.64 CM
DOP CALC LVOT AREA: 3.14 CM2
DOP CALC LVOT CARDIAC INDEX: 2.93 L/MIN/M2
DOP CALC LVOT CARDIAC OUTPUT: 4.84 L/MIN
DOP CALC LVOT DIAMETER: 2 CM
DOP CALC LVOT PEAK VEL VTI: 24.23 CM
DOP CALC LVOT PEAK VEL: 1.1 M/S
DOP CALC LVOT STROKE INDEX: 47.3 ML/M2
DOP CALC LVOT STROKE VOLUME: 76.08
E WAVE DECELERATION TIME: 404 MS
FRACTIONAL SHORTENING: 39 (ref 28–44)
INTERVENTRICULAR SEPTUM IN DIASTOLE (PARASTERNAL SHORT AXIS VIEW): 0.9 CM
INTERVENTRICULAR SEPTUM: 0.9 CM (ref 0.6–1.1)
LAAS-AP2: 19.4 CM2
LAAS-AP4: 21.5 CM2
LEFT ATRIUM SIZE: 4.6 CM
LEFT ATRIUM VOLUME (MOD BIPLANE): 62 ML
LEFT INTERNAL DIMENSION IN SYSTOLE: 2.7 CM (ref 2.1–4)
LEFT VENTRICULAR INTERNAL DIMENSION IN DIASTOLE: 4.4 CM (ref 3.5–6)
LEFT VENTRICULAR POSTERIOR WALL IN END DIASTOLE: 1.1 CM
LEFT VENTRICULAR STROKE VOLUME: 61 ML
LVSV (TEICH): 61 ML
MV E'TISSUE VEL-SEP: 7 CM/S
MV PEAK A VEL: 1.48 M/S
MV PEAK E VEL: 112 CM/S
MV STENOSIS PRESSURE HALF TIME: 117 MS
MV VALVE AREA P 1/2 METHOD: 1.88
RA PRESSURE ESTIMATED: 10 MMHG
RIGHT ATRIUM AREA SYSTOLE A4C: 10.6 CM2
RIGHT VENTRICLE ID DIMENSION: 3.3 CM
RV PSP: 29 MMHG
SL CV LEFT ATRIUM LENGTH A2C: 5.4 CM
SL CV LV EF: 60
SL CV PED ECHO LEFT VENTRICLE DIASTOLIC VOLUME (MOD BIPLANE) 2D: 87 ML
SL CV PED ECHO LEFT VENTRICLE SYSTOLIC VOLUME (MOD BIPLANE) 2D: 26 ML
TR MAX PG: 19 MMHG
TR PEAK VELOCITY: 2.2 M/S
TRICUSPID ANNULAR PLANE SYSTOLIC EXCURSION: 2.2 CM
TRICUSPID VALVE PEAK REGURGITATION VELOCITY: 2.21 M/S

## 2023-09-01 PROCEDURE — 93306 TTE W/DOPPLER COMPLETE: CPT | Performed by: INTERNAL MEDICINE

## 2023-09-01 PROCEDURE — 93306 TTE W/DOPPLER COMPLETE: CPT

## 2023-09-06 ENCOUNTER — TELEPHONE (OUTPATIENT)
Dept: FAMILY MEDICINE CLINIC | Facility: CLINIC | Age: 78
End: 2023-09-06

## 2023-09-06 NOTE — TELEPHONE ENCOUNTER
Left message----- Message from [address] sent at 9/6/2023  4:31 PM EDT -----  Please let the patient know his echocardiogram is normal

## 2023-09-19 ENCOUNTER — HOSPITAL ENCOUNTER (OUTPATIENT)
Dept: RADIOLOGY | Facility: CLINIC | Age: 78
Discharge: HOME | End: 2023-09-19
Payer: COMMERCIAL

## 2023-09-19 VITALS
DIASTOLIC BLOOD PRESSURE: 77 MMHG | HEART RATE: 59 BPM | TEMPERATURE: 97.3 F | RESPIRATION RATE: 20 BRPM | SYSTOLIC BLOOD PRESSURE: 163 MMHG | OXYGEN SATURATION: 99 %

## 2023-09-19 DIAGNOSIS — M47.812 CERVICAL SPONDYLOSIS: ICD-10-CM

## 2023-09-19 PROCEDURE — 64490 INJ PARAVERT F JNT C/T 1 LEV: CPT | Performed by: ANESTHESIOLOGY

## 2023-09-19 PROCEDURE — 64491 INJ PARAVERT F JNT C/T 2 LEV: CPT | Performed by: ANESTHESIOLOGY

## 2023-09-19 RX ORDER — LIDOCAINE HYDROCHLORIDE 10 MG/ML
3 INJECTION, SOLUTION EPIDURAL; INFILTRATION; INTRACAUDAL; PERINEURAL ONCE
Status: COMPLETED | OUTPATIENT
Start: 2023-09-19 | End: 2023-09-19

## 2023-09-19 RX ADMIN — LIDOCAINE HYDROCHLORIDE 3 ML: 10 INJECTION, SOLUTION EPIDURAL; INFILTRATION; INTRACAUDAL; PERINEURAL at 10:35

## 2023-09-19 RX ADMIN — LIDOCAINE HYDROCHLORIDE 1.5 ML: 20 INJECTION, SOLUTION EPIDURAL; INFILTRATION; INTRACAUDAL; PERINEURAL at 10:36
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
## 2023-09-19 NOTE — DISCHARGE INSTRUCTIONS
INSTRUCCIONES PARA EL JACKIE DEL BLOQUEO DE LA TONNY MEDIAL    ACTIVIDAD   Realice las actividades que normalmente provocarían el dolor que estamos midiendo. Por ejemplo, si cuando aspira o camina siente más dolor, urmila eso. [place] le brindará respuestas más precisas para el diario. Puede ducharse, jacob no tome swapna en tinas ni se aplique calor por hoy. CUIDADO DEL ÁREA DE APLICACIÓN DE LA INYECCIÓN   Esta área puede estar entumecida karen varias horas después de la inyección. Informe a [facility] si se presenta alguno de estos síntomas: enrojecimiento, secreción, inflamación o fiebre superior a 100 °F. INSTRUCCIONES ESPECIALES  Envíe de regreso el diario de bloqueo de la tonny medial por correo, fax o pase a dejarlo. MEDICAMENTOS   No tome analgésicos de acción a corto plazo o intermitente karen las 8 horas posteriores  al bloqueo. Continúe tomando todos kely medicamentos de [place].

## 2023-10-12 DIAGNOSIS — E11.9 TYPE 2 DIABETES MELLITUS WITHOUT COMPLICATION, WITHOUT LONG-TERM CURRENT USE OF INSULIN (HCC): ICD-10-CM

## 2023-10-12 RX ORDER — METFORMIN HYDROCHLORIDE 500 MG/1
TABLET, EXTENDED RELEASE ORAL
Qty: 180 TABLET | Refills: 1 | Status: SHIPPED | OUTPATIENT
Start: 2023-10-12

## 2023-10-13 DIAGNOSIS — N18.2 STAGE 2 CHRONIC KIDNEY DISEASE: ICD-10-CM

## 2023-10-13 DIAGNOSIS — E78.5 DYSLIPIDEMIA: ICD-10-CM

## 2023-10-13 RX ORDER — LOSARTAN POTASSIUM 25 MG/1
25 TABLET ORAL DAILY
Qty: 90 TABLET | Refills: 1 | Status: SHIPPED | OUTPATIENT
Start: 2023-10-13

## 2023-10-13 RX ORDER — ATORVASTATIN CALCIUM 20 MG/1
20 TABLET, FILM COATED ORAL
Qty: 90 TABLET | Refills: 1 | Status: SHIPPED | OUTPATIENT
Start: 2023-10-13

## 2023-11-04 DIAGNOSIS — E11.9 TYPE 2 DIABETES MELLITUS WITHOUT COMPLICATION, WITHOUT LONG-TERM CURRENT USE OF INSULIN (HCC): ICD-10-CM

## 2023-11-04 DIAGNOSIS — F32.1 MODERATE MAJOR DEPRESSION (HCC): ICD-10-CM

## 2023-11-04 DIAGNOSIS — G31.84 MILD COGNITIVE IMPAIRMENT: ICD-10-CM

## 2023-11-04 RX ORDER — DONEPEZIL HYDROCHLORIDE 5 MG/1
TABLET, FILM COATED ORAL
Qty: 90 TABLET | Refills: 1 | Status: SHIPPED | OUTPATIENT
Start: 2023-11-04

## 2023-11-04 RX ORDER — ESCITALOPRAM OXALATE 5 MG/1
TABLET ORAL
Qty: 90 TABLET | Refills: 1 | Status: SHIPPED | OUTPATIENT
Start: 2023-11-04

## 2023-11-04 RX ORDER — BLOOD SUGAR DIAGNOSTIC
STRIP MISCELLANEOUS
Qty: 200 EACH | Refills: 2 | Status: SHIPPED | OUTPATIENT
Start: 2023-11-04

## 2024-01-29 ENCOUNTER — TELEPHONE (OUTPATIENT)
Dept: FAMILY MEDICINE CLINIC | Facility: CLINIC | Age: 79
End: 2024-01-29

## 2024-01-29 NOTE — TELEPHONE ENCOUNTER
Voicemail: Good morning. I'm calling on behalf of my father, Renard Benjamin.  I'm calling to cancel an appointment that he has. I think it is for February 16th. I will be transferring him back to Doctor Thais. My phone number is [phone]. Thank you.    - Patient will be etablishing with Doctor Thais [address] [place] [address]. Please update chart.

## 2024-02-01 ENCOUNTER — OFFICE VISIT (OUTPATIENT)
Dept: INTERNAL MEDICINE CLINIC | Facility: CLINIC | Age: 79
End: 2024-02-01
Payer: COMMERCIAL

## 2024-02-01 VITALS
HEART RATE: 77 BPM | BODY MASS INDEX: 23.28 KG/M2 | OXYGEN SATURATION: 97 % | DIASTOLIC BLOOD PRESSURE: 67 MMHG | WEIGHT: 131.4 LBS | SYSTOLIC BLOOD PRESSURE: 141 MMHG

## 2024-02-01 DIAGNOSIS — R13.12 OROPHARYNGEAL DYSPHAGIA: ICD-10-CM

## 2024-02-01 DIAGNOSIS — F32.1 MODERATE MAJOR DEPRESSION (HCC): ICD-10-CM

## 2024-02-01 DIAGNOSIS — E55.9 VITAMIN D DEFICIENCY: ICD-10-CM

## 2024-02-01 DIAGNOSIS — M15.9 PRIMARY OSTEOARTHRITIS INVOLVING MULTIPLE JOINTS: ICD-10-CM

## 2024-02-01 DIAGNOSIS — H61.21 IMPACTED CERUMEN, RIGHT EAR: ICD-10-CM

## 2024-02-01 DIAGNOSIS — G47.00 INSOMNIA, UNSPECIFIED TYPE: ICD-10-CM

## 2024-02-01 DIAGNOSIS — E78.5 DYSLIPIDEMIA: ICD-10-CM

## 2024-02-01 DIAGNOSIS — E11.9 TYPE 2 DIABETES MELLITUS WITHOUT COMPLICATION, WITH LONG-TERM CURRENT USE OF INSULIN (HCC): ICD-10-CM

## 2024-02-01 DIAGNOSIS — N18.2 STAGE 2 CHRONIC KIDNEY DISEASE: ICD-10-CM

## 2024-02-01 DIAGNOSIS — E11.9 TYPE 2 DIABETES MELLITUS WITHOUT COMPLICATION, WITHOUT LONG-TERM CURRENT USE OF INSULIN (HCC): Primary | ICD-10-CM

## 2024-02-01 DIAGNOSIS — M17.0 PRIMARY OSTEOARTHRITIS OF BOTH KNEES: ICD-10-CM

## 2024-02-01 DIAGNOSIS — G31.84 MILD COGNITIVE IMPAIRMENT: ICD-10-CM

## 2024-02-01 DIAGNOSIS — Z79.4 TYPE 2 DIABETES MELLITUS WITHOUT COMPLICATION, WITH LONG-TERM CURRENT USE OF INSULIN (HCC): ICD-10-CM

## 2024-02-01 PROBLEM — M15.0 PRIMARY OSTEOARTHRITIS INVOLVING MULTIPLE JOINTS: Status: ACTIVE | Noted: 2024-02-01

## 2024-02-01 LAB — SL AMB POCT HEMOGLOBIN AIC: 8.6 (ref ?–6.5)

## 2024-02-01 PROCEDURE — 99214 OFFICE O/P EST MOD 30 MIN: CPT

## 2024-02-01 PROCEDURE — 83036 HEMOGLOBIN GLYCOSYLATED A1C: CPT

## 2024-02-01 RX ORDER — ATORVASTATIN CALCIUM 20 MG/1
20 TABLET, FILM COATED ORAL
Qty: 90 TABLET | Refills: 1 | Status: SHIPPED | OUTPATIENT
Start: 2024-02-01

## 2024-02-01 RX ORDER — INSULIN ASPART 100 [IU]/ML
INJECTION, SOLUTION INTRAVENOUS; SUBCUTANEOUS
Qty: 30 ML | Refills: 1 | Status: SHIPPED | OUTPATIENT
Start: 2024-02-01

## 2024-02-01 RX ORDER — MELOXICAM 7.5 MG/1
7.5 TABLET ORAL DAILY
Qty: 30 TABLET | Refills: 2 | Status: SHIPPED | OUTPATIENT
Start: 2024-02-01

## 2024-02-01 RX ORDER — LOSARTAN POTASSIUM 25 MG/1
25 TABLET ORAL DAILY
Qty: 90 TABLET | Refills: 1 | Status: SHIPPED | OUTPATIENT
Start: 2024-02-01

## 2024-02-01 RX ORDER — DONEPEZIL HYDROCHLORIDE 5 MG/1
5 TABLET, FILM COATED ORAL
Qty: 90 TABLET | Refills: 0 | Status: SHIPPED | OUTPATIENT
Start: 2024-02-01 | End: 2024-05-01

## 2024-02-01 RX ORDER — BLOOD SUGAR DIAGNOSTIC
1 STRIP MISCELLANEOUS DAILY
Qty: 90 EACH | Refills: 0 | Status: SHIPPED | OUTPATIENT
Start: 2024-02-01 | End: 2024-05-01

## 2024-02-01 RX ORDER — INSULIN GLARGINE 100 [IU]/ML
INJECTION, SOLUTION SUBCUTANEOUS
Qty: 15 ML | Refills: 3 | Status: SHIPPED | OUTPATIENT
Start: 2024-02-01

## 2024-02-01 RX ORDER — SAW PALMETTO 250 MG
8 CAPSULE ORAL
Qty: 90 TABLET | Refills: 0 | Status: SHIPPED | OUTPATIENT
Start: 2024-02-01

## 2024-02-01 RX ORDER — ESCITALOPRAM OXALATE 5 MG/1
5 TABLET ORAL DAILY
Qty: 90 TABLET | Refills: 1 | Status: SHIPPED | OUTPATIENT
Start: 2024-02-01

## 2024-02-01 RX ORDER — METFORMIN HYDROCHLORIDE 500 MG/1
TABLET, EXTENDED RELEASE ORAL
Qty: 180 TABLET | Refills: 1 | Status: SHIPPED | OUTPATIENT
Start: 2024-02-01

## 2024-02-01 RX ORDER — LANCETS 33 GAUGE
EACH MISCELLANEOUS 4 TIMES DAILY
Qty: 200 EACH | Refills: 2 | Status: SHIPPED | OUTPATIENT
Start: 2024-02-01

## 2024-02-01 NOTE — ASSESSMENT & PLAN NOTE
Patient complains of oropharyngeal dysphagia  Dysphagia is for both solids and liquids  Patient was a former smoker and consumed alcohol  Patient has lost his appetite secondary to dysphagia    PLAN:  GI referral

## 2024-02-01 NOTE — ASSESSMENT & PLAN NOTE
Patient complains of second MCP joint pain of the right hand  Patient is unable to make a fist with his index finger  Patient has similar pain on the left foot  Patient has history of osteoarthritis    PLAN:  X-ray of the right hand and the left foot

## 2024-02-01 NOTE — ASSESSMENT & PLAN NOTE
Lab Results   Component Value Date    HGBA1C 6.9 (H) 08/17/2023   Hemoglobin A1c at 8.6 today  Patient's glucose levels have ranged from 200-400 at home  Patient has decreased appetite secondary to oropharyngeal dysphagia  And lives with his wife at home who also has dementia     continue with current management at this point  .  Case management

## 2024-02-01 NOTE — PROGRESS NOTES
Assessment/Plan:    Type 2 diabetes mellitus (HCC)    Lab Results   Component Value Date    HGBA1C 6.9 (H) 08/17/2023   Hemoglobin A1c at 8.6 today  Patient's glucose levels have ranged from 200-400 at home  Patient has decreased appetite secondary to oropharyngeal dysphagia  And lives with his wife at home who also has dementia     continue with current management at this point  .  Case management    Oropharyngeal dysphagia  Patient complains of oropharyngeal dysphagia  Dysphagia is for both solids and liquids  Patient was a former smoker and consumed alcohol  Patient has lost his appetite secondary to dysphagia    PLAN:  GI referral    Primary osteoarthritis involving multiple joints  Patient complains of second MCP joint pain of the right hand  Patient is unable to make a fist with his index finger  Patient has similar pain on the left foot  Patient has history of osteoarthritis    PLAN:  X-ray of the right hand and the left foot     Diagnoses and all orders for this visit:    Type 2 diabetes mellitus without complication, without long-term current use of insulin ([facility])  -     POCT hemoglobin A1c  -     glucose blood (Accu-Chek Sophia Plus) test strip; Use 1 each in the morning Use as instructed  -     metFORMIN (GLUCOPHAGE-XR) 500 mg 24 hr tablet; 1 table twice daily  -     NovoLOG FlexPen 100 units/mL injection pen; Inject 12 units before breakfast, 8 units before lunch and 8 units before supper for diabetes  -     CBC and differential; Future  -     Basic metabolic panel; Future    Dyslipidemia  -     atorvastatin (LIPITOR) 20 mg tablet; Take 1 tablet (20 mg total) by mouth daily at bedtime    Impacted cerumen, right ear  -     carbamide peroxide (DEBROX) 6.5 % otic solution; Administer 5 drops to the right ear 2 (two) times a day    Mild cognitive impairment  -     donepezil (ARICEPT) 5 mg tablet; Take 1 tablet (5 mg total) by mouth daily at bedtime  -     TSH + Free T4; Future  -     Vitamin D 25 hydroxy;  Future  -     Ambulatory Referral to Social Work Care Management Program; Future    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HCC)  -     escitalopram (LEXAPRO) 5 mg tablet; Take 1 tablet (5 mg total) by mouth daily    Type 2 diabetes mellitus without complication, with long-term current use of insulin (HCC)  Comments:  bloodwork ordered, ada diet  Orders:  -     Insulin Pen Needle (Comfort EZ Pen Needles) 32G X 5 MM MISC; Inject into the skin 4 (four) times a day  -     Insulin Glargine Solostar (Lantus SoloStar) 100 UNIT/ML SOPN; INJECT 12 UNITS AT BEDTIME    Stage 2 chronic kidney disease  -     losartan (COZAAR) 25 mg tablet; Take 1 tablet (25 mg total) by mouth daily    Primary osteoarthritis of both knees  -     meloxicam (Mobic) 7.5 mg tablet; Take 1 tablet (7.5 mg total) by mouth daily    Insomnia, unspecified type  -     Melatonin-Pyridoxine (Melatin) 3-1 MG TABS; Take 8 mg by mouth daily at bedtime    Oropharyngeal dysphagia  -     Ambulatory Referral to Gastroenterology; Future    Primary osteoarthritis involving multiple joints  -     XR hand 3+ vw right; Future  -     XR foot 3+ vw left; Future    Vitamin D deficiency  -     Vitamin D 25 hydroxy; Future          Subjective:      Patient ID: Renard Antoine is a 78 y.o. male.    HPI 78-year-old male with past medical history of diabetes, CKD, aortic valve replacement and osteoarthritis comes in for reestablishment of the care.  Patient recently came back from [place].  Patient says he has been compliant with his medication however HbA1c has increased significantly in the past few months.  Patient complains of right hand pain along with left foot pain.  Patient also complains of dysphagia which led to poor appetite.  Patient looks malnourished.  Patient denies any chest pain, abdominal pain or shortness of breath.    The following portions of the patient's history were reviewed and updated as appropriate: allergies, current medications, past family history, past  medical history, past social history, past surgical history, and problem list.    Review of Systems   Constitutional:  Positive for activity change and appetite change.   HENT:  Positive for trouble swallowing.    Eyes: Negative.    Respiratory: Negative.     Cardiovascular: Negative.    Gastrointestinal: Negative.    Endocrine: Negative.    Genitourinary: Negative.    Musculoskeletal:  Positive for arthralgias.   Skin: Negative.    Allergic/Immunologic: Negative.    Neurological: Negative.    Hematological: Negative.    Psychiatric/Behavioral: Negative.           Objective:      /67   Pulse 77   Wt 59.6 kg (131 lb 6.4 oz)   SpO2 97%   BMI 23.28 kg/m²          Physical Exam  Constitutional:       Appearance: He is ill-appearing.   HENT:      Head: Normocephalic and atraumatic.      Right Ear: External ear normal.      Left Ear: External ear normal.      Nose: Nose normal.      Mouth/Throat:      Mouth: Mucous membranes are moist.      Pharynx: Oropharynx is clear.   Eyes:      Extraocular Movements: Extraocular movements intact.      Conjunctiva/sclera: Conjunctivae normal.      Pupils: Pupils are equal, round, and reactive to light.   Cardiovascular:      Rate and Rhythm: Normal rate and regular rhythm.      Pulses: Normal pulses.      Heart sounds: Murmur heard.   Pulmonary:      Effort: Pulmonary effort is normal.      Breath sounds: Normal breath sounds.   Abdominal:      General: Abdomen is flat. Bowel sounds are normal.      Palpations: Abdomen is soft.   Musculoskeletal:         General: Tenderness present.      Cervical back: Normal range of motion.      Comments: Tenderness at the second MCP joint of the right hand  Tenderness on the joint of the left foot   Skin:     General: Skin is warm.      Capillary Refill: Capillary refill takes less than 2 seconds.   Neurological:      Mental Status: He is alert. Mental status is at baseline.

## 2024-02-08 ENCOUNTER — PATIENT OUTREACH (OUTPATIENT)
Dept: INTERNAL MEDICINE CLINIC | Facility: CLINIC | Age: 79
End: 2024-02-08

## 2024-02-14 ENCOUNTER — APPOINTMENT (OUTPATIENT)
Dept: LAB | Facility: CLINIC | Age: 79
End: 2024-02-14
Payer: COMMERCIAL

## 2024-02-14 ENCOUNTER — HOSPITAL ENCOUNTER (OUTPATIENT)
Dept: RADIOLOGY | Facility: HOSPITAL | Age: 79
Discharge: HOME | End: 2024-02-14
Payer: COMMERCIAL

## 2024-02-14 DIAGNOSIS — G31.84 MILD COGNITIVE IMPAIRMENT: ICD-10-CM

## 2024-02-14 DIAGNOSIS — E55.9 VITAMIN D DEFICIENCY: ICD-10-CM

## 2024-02-14 DIAGNOSIS — E11.9 TYPE 2 DIABETES MELLITUS WITHOUT COMPLICATION, WITHOUT LONG-TERM CURRENT USE OF INSULIN (HCC): ICD-10-CM

## 2024-02-14 DIAGNOSIS — M15.9 PRIMARY OSTEOARTHRITIS INVOLVING MULTIPLE JOINTS: ICD-10-CM

## 2024-02-14 LAB
25(OH)D3 SERPL-MCNC: 18.1 NG/ML (ref 30–100)
ANION GAP SERPL CALCULATED.3IONS-SCNC: 14 MMOL/L
BASOPHILS # BLD AUTO: 0.05 THOUSANDS/ÂΜL (ref 0–0.1)
BASOPHILS NFR BLD AUTO: 1 % (ref 0–1)
BUN SERPL-MCNC: 18 MG/DL (ref 5–25)
CALCIUM SERPL-MCNC: 9.6 MG/DL (ref 8.4–10.2)
CHLORIDE SERPL-SCNC: 99 MMOL/L (ref 96–108)
CO2 SERPL-SCNC: 25 MMOL/L (ref 21–32)
CREAT SERPL-MCNC: 0.85 MG/DL (ref 0.6–1.3)
EOSINOPHIL # BLD AUTO: 0.31 THOUSAND/ÂΜL (ref 0–0.61)
EOSINOPHIL NFR BLD AUTO: 5 % (ref 0–6)
ERYTHROCYTE [DISTWIDTH] IN BLOOD BY AUTOMATED COUNT: 14.3 % (ref 11.6–15.1)
GFR SERPL CREATININE-BSD FRML MDRD: 82 ML/MIN/1.73SQ M
GLUCOSE P FAST SERPL-MCNC: 140 MG/DL (ref 65–99)
HCT VFR BLD AUTO: 43.2 % (ref 36.5–49.3)
HGB BLD-MCNC: 13.1 G/DL (ref 12–17)
IMM GRANULOCYTES # BLD AUTO: 0.02 THOUSAND/UL (ref 0–0.2)
IMM GRANULOCYTES NFR BLD AUTO: 0 % (ref 0–2)
LYMPHOCYTES # BLD AUTO: 0.94 THOUSANDS/ÂΜL (ref 0.6–4.47)
LYMPHOCYTES NFR BLD AUTO: 14 % (ref 14–44)
MCH RBC QN AUTO: 26.3 PG (ref 26.8–34.3)
MCHC RBC AUTO-ENTMCNC: 30.3 G/DL (ref 31.4–37.4)
MCV RBC AUTO: 87 FL (ref 82–98)
MONOCYTES # BLD AUTO: 0.88 THOUSAND/ÂΜL (ref 0.17–1.22)
MONOCYTES NFR BLD AUTO: 13 % (ref 4–12)
NEUTROPHILS # BLD AUTO: 4.66 THOUSANDS/ÂΜL (ref 1.85–7.62)
NEUTS SEG NFR BLD AUTO: 67 % (ref 43–75)
NRBC BLD AUTO-RTO: 0 /100 WBCS
PLATELET # BLD AUTO: 223 THOUSANDS/UL (ref 149–390)
PMV BLD AUTO: 9.6 FL (ref 8.9–12.7)
POTASSIUM SERPL-SCNC: 4.1 MMOL/L (ref 3.5–5.3)
RBC # BLD AUTO: 4.99 MILLION/UL (ref 3.88–5.62)
SODIUM SERPL-SCNC: 138 MMOL/L (ref 135–147)
T4 FREE SERPL-MCNC: 0.84 NG/DL (ref 0.61–1.12)
TSH SERPL DL<=0.05 MIU/L-ACNC: 2.39 UIU/ML (ref 0.45–4.5)
WBC # BLD AUTO: 6.86 THOUSAND/UL (ref 4.31–10.16)

## 2024-02-14 PROCEDURE — 84443 ASSAY THYROID STIM HORMONE: CPT

## 2024-02-14 PROCEDURE — 73130 X-RAY EXAM OF HAND: CPT

## 2024-02-14 PROCEDURE — 82306 VITAMIN D 25 HYDROXY: CPT

## 2024-02-14 PROCEDURE — 85025 COMPLETE CBC W/AUTO DIFF WBC: CPT

## 2024-02-14 PROCEDURE — 84439 ASSAY OF FREE THYROXINE: CPT

## 2024-02-14 PROCEDURE — 73630 X-RAY EXAM OF FOOT: CPT

## 2024-02-14 PROCEDURE — 36415 COLL VENOUS BLD VENIPUNCTURE: CPT

## 2024-02-14 PROCEDURE — 80048 BASIC METABOLIC PNL TOTAL CA: CPT

## 2024-02-15 ENCOUNTER — PATIENT OUTREACH (OUTPATIENT)
Dept: INTERNAL MEDICINE CLINIC | Facility: CLINIC | Age: 79
End: 2024-02-15

## 2024-02-15 NOTE — PROGRESS NOTES
2nd outreach attempt to patient/patient's dtr Damaris to assess needs in the home.  Per chart review, patient has mild cognitive impairment and lives with his wife who also has dementia diagnosis.    Phone number in chart for patient is phone number for Damaris.     Call placed to Damaris to further assess needs of patient.  No answer, voicemail left, and awaiting return call.

## 2024-02-21 ENCOUNTER — PATIENT OUTREACH (OUTPATIENT)
Dept: INTERNAL MEDICINE CLINIC | Facility: CLINIC | Age: 79
End: 2024-02-21

## 2024-02-21 PROBLEM — Z12.5 SCREENING PSA (PROSTATE SPECIFIC ANTIGEN): Status: RESOLVED | Noted: 2018-11-23 | Resolved: 2024-02-21

## 2024-02-21 NOTE — LETTER
[id number] [id number]    Re: Unable to Reach   2/21/2024       Dear Renard or Damaris,    I am a [facility]  and wanted to be certain you had information to contact me should you desire assistance with or have questions about non-medical aspects of your care such as [but not limited to] medical insurance, housing, transportation, material needs, or emergency needs, as I was unable to reach you.  If I do not have an answer I will assist you in finding the appropriate agency or individual who can help.      Please feel free to contact me at [phone]. Thank You.    Sincerely,         Mare Beebe

## 2024-02-21 NOTE — PROGRESS NOTES
Final outreach attempt to patient/patient's dtr Damaris to assess needs in the home.  Per chart review, patient has mild cognitive impairment and lives with his wife who also has dementia diagnosis.     Phone number in chart for patient is phone number for Damaris.     Call placed to Damaris to further assess needs of patient.  No answer, voicemail left, and awaiting return call.    Will send Unable to Reach letter to patient via mail and close case at this time.

## 2024-02-28 ENCOUNTER — CONSULT (OUTPATIENT)
Dept: GASTROENTEROLOGY | Facility: CLINIC | Age: 79
End: 2024-02-28
Payer: COMMERCIAL

## 2024-02-28 VITALS
SYSTOLIC BLOOD PRESSURE: 116 MMHG | BODY MASS INDEX: 23.5 KG/M2 | TEMPERATURE: 98.4 F | DIASTOLIC BLOOD PRESSURE: 72 MMHG | HEIGHT: 63 IN | WEIGHT: 132.6 LBS

## 2024-02-28 DIAGNOSIS — R63.4 WEIGHT LOSS: Primary | ICD-10-CM

## 2024-02-28 DIAGNOSIS — R13.12 OROPHARYNGEAL DYSPHAGIA: ICD-10-CM

## 2024-02-28 PROCEDURE — 99204 OFFICE O/P NEW MOD 45 MIN: CPT | Performed by: INTERNAL MEDICINE

## 2024-02-28 RX ORDER — POLYETHYLENE GLYCOL 3350, SODIUM CHLORIDE, SODIUM BICARBONATE, POTASSIUM CHLORIDE 420; 11.2; 5.72; 1.48 G/4L; G/4L; G/4L; G/4L
4000 POWDER, FOR SOLUTION ORAL ONCE
Qty: 4000 ML | Refills: 0 | Status: SHIPPED | OUTPATIENT
Start: 2024-02-28 | End: 2024-02-28

## 2024-02-28 RX ORDER — POLYETHYLENE GLYCOL 3350 17 G/17G
238 POWDER, FOR SOLUTION ORAL ONCE
Qty: 238 G | Refills: 0 | Status: SHIPPED | OUTPATIENT
Start: 2024-02-28 | End: 2024-02-28
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
## 2024-02-28 NOTE — PATIENT INSTRUCTIONS
Scheduled date of EGD/colonoscopy (as of today): 05/29/2024  Physician performing EGD/colonoscopy: Michael  Location of EGD/colonoscopy: [place]  Desired bowel prep reviewed with patient:2 day prep Golytely / Dulcolax  Instructions reviewed with patient by: Mikaela  Clearances:   NONE    Patient is DIABETIC and takes BP meds    Recall put in for Chirayath for follow up visit

## 2024-05-08 DIAGNOSIS — G31.84 MILD COGNITIVE IMPAIRMENT: ICD-10-CM

## 2024-05-08 RX ORDER — DONEPEZIL HYDROCHLORIDE 5 MG/1
5 TABLET, FILM COATED ORAL
Qty: 90 TABLET | Refills: 1 | Status: SHIPPED | OUTPATIENT
Start: 2024-05-08 | End: 2024-11-04

## 2024-05-14 ENCOUNTER — OFFICE VISIT (OUTPATIENT)
Dept: INTERNAL MEDICINE CLINIC | Facility: CLINIC | Age: 79
End: 2024-05-14
Payer: COMMERCIAL

## 2024-05-14 VITALS
BODY MASS INDEX: 22.82 KG/M2 | DIASTOLIC BLOOD PRESSURE: 60 MMHG | HEART RATE: 73 BPM | SYSTOLIC BLOOD PRESSURE: 120 MMHG | OXYGEN SATURATION: 97 % | HEIGHT: 65 IN | TEMPERATURE: 97.3 F | WEIGHT: 137 LBS

## 2024-05-14 DIAGNOSIS — M15.9 PRIMARY OSTEOARTHRITIS INVOLVING MULTIPLE JOINTS: ICD-10-CM

## 2024-05-14 DIAGNOSIS — R13.12 OROPHARYNGEAL DYSPHAGIA: ICD-10-CM

## 2024-05-14 DIAGNOSIS — E11.9 TYPE 2 DIABETES MELLITUS WITHOUT COMPLICATION, WITHOUT LONG-TERM CURRENT USE OF INSULIN (HCC): Primary | ICD-10-CM

## 2024-05-14 LAB — SL AMB POCT HEMOGLOBIN AIC: 7 (ref ?–6.5)

## 2024-05-14 PROCEDURE — 83036 HEMOGLOBIN GLYCOSYLATED A1C: CPT

## 2024-05-14 PROCEDURE — 99214 OFFICE O/P EST MOD 30 MIN: CPT

## 2024-05-14 NOTE — ASSESSMENT & PLAN NOTE
Patient has history of dysphagia to liquids which is intermittent in nature  Patient was asked to follow-up with GI  GI recommended CT abdomen, EGD and colonoscopy however patient did not follow-up with any other investigations  Patient has been regaining his weight and improving his appetite    PLAN:  Patient will get CT abdomen  And EGD and colonoscopy in the future depending on the CT results

## 2024-05-14 NOTE — ASSESSMENT & PLAN NOTE
X-ray of the right hand shows osteoarthritis in the first MCP  X-ray of the left foot showed osteoarthritis    PLAN:  Tylenol as needed

## 2024-05-14 NOTE — PROGRESS NOTES
Name: Renard Antoine      : 1945      MRN: [id number]  Encounter Provider: Reta Mantilla MD  Encounter Date: 2024   Encounter department: [facility]    Assessment & Plan     1. Type 2 diabetes mellitus without complication, without long-term current use of insulin ([facility])  Assessment & Plan:    Lab Results   Component Value Date    HGBA1C 7.0 (A) 2024     PLAN:  Continue current management    Orders:  -     POCT hemoglobin A1c    2. Oropharyngeal dysphagia  Assessment & Plan:  Patient has history of dysphagia to liquids which is intermittent in nature  Patient was asked to follow-up with GI  GI recommended CT abdomen, EGD and colonoscopy however patient did not follow-up with any other investigations  Patient has been regaining his weight and improving his appetite    PLAN:  Patient will get CT abdomen  And EGD and colonoscopy in the future depending on the CT results      3. Primary osteoarthritis involving multiple joints  Assessment & Plan:  X-ray of the right hand shows osteoarthritis in the first MCP  X-ray of the left foot showed osteoarthritis    PLAN:  Tylenol as needed             Subjective      HPI 78-year-old male comes in for follow-up after 3 months.  Patient still has intermittent dysphagia and was asked to follow-up with GI however patient did not get investigations which were requested by GI.  Patient has gained 5 pounds since his previous visit.  Denies any nausea or vomiting episodes.  Patient still complains of osteomyelitis in the right hand and left hand but has been managed by as needed Tylenol at home.  Patient denies any chest pain, shortness of breath or abdominal pain.      Review of Systems   Constitutional:  Positive for fatigue.   HENT: Negative.     Eyes: Negative.    Respiratory: Negative.     Cardiovascular: Negative.    Gastrointestinal: Negative.    Endocrine: Negative.    Genitourinary: Negative.    Musculoskeletal:  Positive for  arthralgias.   Skin: Negative.    Allergic/Immunologic: Negative.    Neurological: Negative.    Hematological: Negative.    Psychiatric/Behavioral: Negative.         Current Outpatient Medications on File Prior to Visit   Medication Sig    Accu-Chek FastClix Lancets MISC Inject into the skin 3 (three) times a day Test    atorvastatin (LIPITOR) 20 mg tablet Take 1 tablet (20 mg total) by mouth daily at bedtime    donepezil (ARICEPT) 5 mg tablet TAKE 1 TABLET (5 MG TOTAL) BY MOUTH DAILY AT BEDTIME    escitalopram (LEXAPRO) 5 mg tablet Take 1 tablet (5 mg total) by mouth daily    Insulin Glargine Solostar (Lantus SoloStar) 100 UNIT/ML SOPN INJECT 12 UNITS AT BEDTIME    Insulin Pen Needle (Comfort EZ Pen Needles) 32G X 5 MM MISC Inject into the skin 4 (four) times a day    losartan (COZAAR) 25 mg tablet Take 1 tablet (25 mg total) by mouth daily    Melatonin-Pyridoxine (Melatin) 3-1 MG TABS Take 8 mg by mouth daily at bedtime    meloxicam (Mobic) 7.5 mg tablet Take 1 tablet (7.5 mg total) by mouth daily    metFORMIN (GLUCOPHAGE-XR) 500 mg 24 hr tablet 1 table twice daily    NovoLOG FlexPen 100 units/mL injection pen Inject 12 units before breakfast, 8 units before lunch and 8 units before supper for diabetes    carbamide peroxide (DEBROX) 6.5 % otic solution Administer 5 drops to the right ear 2 (two) times a day (Patient not taking: Reported on 5/14/2024)    ketorolac (ACULAR) 0.5 % ophthalmic solution INSTILL 1 DROP 4 TIMES A DAY INTO SURGERY EYE. START 3 DAYS PRIOR TO SURGERY. (Patient not taking: Reported on 2/1/2024)    methocarbamol (ROBAXIN) 500 mg tablet Take 1 tablet (500 mg total) by mouth 3 (three) times a day (Patient not taking: Reported on 2/1/2024)    polyethylene glycol (MiraLax) 17 GM/SCOOP powder Take 238 g by mouth once for 1 dose Take 238 g my mouth. Use as directed    polyethylene glycol-electrolytes (TriLyte) 4000 mL solution Take 4,000 mL by mouth once for 1 dose Take 4000 mL by mouth once for 1  "dose. Use as directed       Objective     /60 (BP Location: Left arm, Patient Position: Sitting, Cuff Size: Large)   Pulse 73   Temp (!) 97.3 °F (36.3 °C) (Probe)   Ht 5' 4.67\" (1.643 m)   Wt 62.1 kg (137 lb)   SpO2 97%   BMI 23.03 kg/m²     Physical Exam  HENT:      Head: Normocephalic and atraumatic.      Right Ear: External ear normal.      Left Ear: External ear normal.      Nose: Nose normal.      Mouth/Throat:      Mouth: Mucous membranes are moist.      Pharynx: Oropharynx is clear.   Eyes:      Extraocular Movements: Extraocular movements intact.      Conjunctiva/sclera: Conjunctivae normal.      Pupils: Pupils are equal, round, and reactive to light.   Cardiovascular:      Rate and Rhythm: Normal rate and regular rhythm.      Pulses: Normal pulses.      Heart sounds: Murmur heard.   Pulmonary:      Effort: Pulmonary effort is normal.      Breath sounds: Normal breath sounds.   Abdominal:      General: Abdomen is flat. Bowel sounds are normal.      Palpations: Abdomen is soft.   Musculoskeletal:         General: Tenderness present.      Cervical back: Normal range of motion.      Comments: Tenderness at the second MCP joint of the right hand  Tenderness on the joint of the left foot   Skin:     General: Skin is warm.      Capillary Refill: Capillary refill takes less than 2 seconds.   Neurological:      Mental Status: He is alert. Mental status is at baseline.       Reta Mantilla MD    "

## 2024-05-14 NOTE — ASSESSMENT & PLAN NOTE
Lab Results   Component Value Date    HGBA1C 7.0 (A) 05/14/2024     PLAN:  Continue current management

## 2024-05-23 ENCOUNTER — TELEPHONE (OUTPATIENT)
Dept: GASTROENTEROLOGY | Facility: HOSPITAL | Age: 79
End: 2024-05-23

## 2024-07-15 ENCOUNTER — OFFICE VISIT (OUTPATIENT)
Dept: INTERNAL MEDICINE CLINIC | Facility: CLINIC | Age: 79
End: 2024-07-15
Payer: COMMERCIAL

## 2024-07-15 VITALS — DIASTOLIC BLOOD PRESSURE: 68 MMHG | SYSTOLIC BLOOD PRESSURE: 138 MMHG

## 2024-07-15 DIAGNOSIS — M79.604 RIGHT LEG PAIN: ICD-10-CM

## 2024-07-15 DIAGNOSIS — R26.89 BALANCE DISORDER: ICD-10-CM

## 2024-07-15 DIAGNOSIS — M79.661 RIGHT CALF PAIN: Primary | ICD-10-CM

## 2024-07-15 DIAGNOSIS — M54.50 CHRONIC LOW BACK PAIN, UNSPECIFIED BACK PAIN LATERALITY, UNSPECIFIED WHETHER SCIATICA PRESENT: ICD-10-CM

## 2024-07-15 DIAGNOSIS — G89.29 CHRONIC LOW BACK PAIN, UNSPECIFIED BACK PAIN LATERALITY, UNSPECIFIED WHETHER SCIATICA PRESENT: ICD-10-CM

## 2024-07-15 PROBLEM — I48.0 PAF (PAROXYSMAL ATRIAL FIBRILLATION) (HCC): Status: RESOLVED | Noted: 2023-08-15 | Resolved: 2024-07-15

## 2024-07-15 PROCEDURE — 99214 OFFICE O/P EST MOD 30 MIN: CPT | Performed by: GENERAL ACUTE CARE HOSPITAL

## 2024-07-15 PROCEDURE — G2211 COMPLEX E/M VISIT ADD ON: HCPCS | Performed by: GENERAL ACUTE CARE HOSPITAL

## 2024-07-15 NOTE — ASSESSMENT & PLAN NOTE
Patient initially reports pain mostly at the right calf but later he said he also had pain throughout his whole right leg in the back.  Given his chronic low back pain this might be possible sciatica symptoms.  There is no red flag sign.  Refer to PT. continue over-the-counter pain medications

## 2024-07-15 NOTE — ASSESSMENT & PLAN NOTE
Acute right calf pain started yesterday.  Possible fall the night before but history unclear due to underlying dementia  On exam there is tenderness in right calf.  No swelling.  Given the new onset of this symptom and physical finding we will get venous Doppler to rule out DVT

## 2024-07-15 NOTE — PROGRESS NOTES
Ambulatory Visit  Name: Renard Antoine      : 1945      MRN: [id number]  Encounter Provider: Solange Prado MD  Encounter Date: 7/15/2024   Encounter department: [facility]    Assessment & Plan   1. Right calf pain  Assessment & Plan:  Acute right calf pain started yesterday.  Possible fall the night before but history unclear due to underlying dementia  On exam there is tenderness in right calf.  No swelling.  Given the new onset of this symptom and physical finding we will get venous Doppler to rule out DVT  Orders:  -     Ambulatory Referral to Physical Therapy; Future  -     VAS VENOUS DUPLEX -LOWER LIMB UNILATERAL; Future; Expected date: 07/15/2024  2. Balance disorder  Assessment & Plan:  Refer to PT  Has history of normal pressure hydrocephalus had shunt placed in the past.  Advised patient to follow-up with PCP if this continues and consider further evaluation.  He had a shunt study a year ago that was unremarkable on chart review  Orders:  -     Ambulatory Referral to Physical Therapy; Future  3. Chronic low back pain, unspecified back pain laterality, unspecified whether sciatica present  Assessment & Plan:  This is chronic.  There are no red flag signs today.  Refer to PT  Orders:  -     Ambulatory Referral to Physical Therapy; Future  -     XR spine lumbar minimum 4 views non injury; Future; Expected date: 07/15/2024  4. Right leg pain  Assessment & Plan:  Patient initially reports pain mostly at the right calf but later he said he also had pain throughout his whole right leg in the back.  Given his chronic low back pain this might be possible sciatica symptoms.  There is no red flag sign.  Refer to PT. continue over-the-counter pain medications  Orders:  -     Ambulatory Referral to Physical Therapy; Future       History of Present Illness     HPI  I am seeing this patient for the first time today   patient says he might have had a fall on Saturday night.  He might have  buckled his legs.  He could not recall what exactly happened.  He has underlying cognitive impairment.  Sunday morning after waking up he noticed right leg pain.  Initially he said it is mainly in the right calf.  Later on he says also having pain at the posterior thigh.    Chronic lower back pain.  Chronic issue with balance.  History of normal pressure hydrocephalus had  shunt placed      Review of Systems    Objective     /68     Physical Exam  Administrative Statements

## 2024-07-15 NOTE — ASSESSMENT & PLAN NOTE
Refer to PT  Has history of normal pressure hydrocephalus had shunt placed in the past.  Advised patient to follow-up with PCP if this continues and consider further evaluation.  He had a shunt study a year ago that was unremarkable on chart review

## 2024-07-16 ENCOUNTER — TELEPHONE (OUTPATIENT)
Age: 79
End: 2024-07-16

## 2024-07-16 ENCOUNTER — TELEPHONE (OUTPATIENT)
Dept: INTERNAL MEDICINE CLINIC | Facility: CLINIC | Age: 79
End: 2024-07-16

## 2024-07-16 ENCOUNTER — HOSPITAL ENCOUNTER (OUTPATIENT)
Dept: VASCULAR ULTRASOUND | Facility: HOSPITAL | Age: 79
Discharge: HOME | End: 2024-07-16
Payer: COMMERCIAL

## 2024-07-16 DIAGNOSIS — M79.661 RIGHT CALF PAIN: ICD-10-CM

## 2024-07-16 PROCEDURE — 93971 EXTREMITY STUDY: CPT

## 2024-07-16 PROCEDURE — 93971 EXTREMITY STUDY: CPT | Performed by: SURGERY

## 2024-07-29 ENCOUNTER — APPOINTMENT (EMERGENCY)
Dept: RADIOLOGY | Facility: HOSPITAL | Age: 79
End: 2024-07-29
Payer: COMMERCIAL

## 2024-07-29 ENCOUNTER — HOSPITAL ENCOUNTER (EMERGENCY)
Facility: HOSPITAL | Age: 79
Discharge: HOME | End: 2024-07-29
Attending: EMERGENCY MEDICINE
Payer: COMMERCIAL

## 2024-07-29 VITALS
SYSTOLIC BLOOD PRESSURE: 160 MMHG | RESPIRATION RATE: 20 BRPM | HEART RATE: 66 BPM | DIASTOLIC BLOOD PRESSURE: 73 MMHG | OXYGEN SATURATION: 97 % | TEMPERATURE: 98 F

## 2024-07-29 DIAGNOSIS — S09.90XA CLOSED HEAD INJURY, INITIAL ENCOUNTER: ICD-10-CM
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
## 2024-07-29 LAB
ATRIAL RATE: 70 BPM
P AXIS: -50 DEGREES
PR INTERVAL: 116 MS
QRS AXIS: -31 DEGREES
QRSD INTERVAL: 88 MS
QT INTERVAL: 378 MS
QTC INTERVAL: 408 MS
T WAVE AXIS: 30 DEGREES
VENTRICULAR RATE: 70 BPM

## 2024-07-29 PROCEDURE — 72125 CT NECK SPINE W/O DYE: CPT

## 2024-07-29 PROCEDURE — 70450 CT HEAD/BRAIN W/O DYE: CPT

## 2024-07-29 PROCEDURE — 99285 EMERGENCY DEPT VISIT HI MDM: CPT | Performed by: EMERGENCY MEDICINE

## 2024-07-29 PROCEDURE — 93005 ELECTROCARDIOGRAM TRACING: CPT

## 2024-07-29 PROCEDURE — 93010 ELECTROCARDIOGRAM REPORT: CPT | Performed by: INTERNAL MEDICINE

## 2024-07-29 PROCEDURE — 99284 EMERGENCY DEPT VISIT MOD MDM: CPT
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
## 2024-07-29 NOTE — ED ATTENDING ATTESTATION
I, Wayne Torrez MD, saw and evaluated the patient. I have discussed the patient with the resident and agree with the resident's findings, Plan of Care, and MDM as documented in the resident's note, except where noted. All available labs and Radiology studies were reviewed.  I was present for key portions of any procedure(s) performed by the resident and I was immediately available to provide assistance.    At this point I agree with the current assessment done in the Emergency Department.  I have conducted an independent evaluation of this patient a history and physical is as follows:    79 yo male with a history of DM, hydrocephalus s/p shunting, HTN, aortic stenosis, CAD, paroxysmal atrial fibrillation, and CKD brought to the ED by EMS for evaluation s/p a mechanical fall. The patient says he was walking around his home today without his cane, lost his balance, and fell backwards. He struck his posterior head and neck on the floor. No LOC. He was able to get himself up off the floor. He says he falls occasionally at home. No chest pain, shortness of breath, dizziness, or lightheadedness prior to or after the fall. He denies visual disturbance, nausea, and vomiting. He was experiencing some posterior head and neck pain following the fall so he called 9-1-1 although most of his pain has now resolved. He was on daily ASA but has not taken it in about a month. No other specific injuries or complaints.    ROS: per resident physician note    Gen: NAD, AA&Ox3  HEENT: PERRL, EOMI, no hemotympanum  Neck: supple, no midline bony tenderness  CV: RRR  Lungs: CTA B/L  Abdomen: soft, NT/ND  Ext: no swelling or deformity  Neuro: 5/5 strength all extremities, sensation grossly intact  Skin: no rash    ED Course  The patient is very well appearing with stable vital signs and a benign physical examination. No external signs of trauma. He has a history of falls at home --> earlier event seems entirely mechanical. Will check  EKG and CT head/c-spine. Fingerstick glucose was normal in the field. Disposition per imaging and reassessment. Will continue to monitor in the ED.      Critical Care Time  Procedures

## 2024-07-30 ENCOUNTER — VBI (OUTPATIENT)
Dept: INTERNAL MEDICINE CLINIC | Facility: CLINIC | Age: 79
End: 2024-07-30

## 2024-07-30 NOTE — TELEPHONE ENCOUNTER
07/30/24 9:12 AM    Patient contacted post ED visit, first outreach attempt made. Message was left for patient to return a call to the VBI Department at [facility]: Phone [phone].    Thank you.  DIEGO GARCIA  [facility]

## 2024-07-30 NOTE — LETTER
[facility]  [address] [place] [address]    Date: 08/02/24    Renard Antoine  [address] [place] [address]    Dear Renard:                                                                                                                                Thank you for choosing [facility] emergency department for care.  Your primary care provider wants to make sure that your ongoing medical care is being addressed. If you require follow up care as a result of your emergency department visit, there are a few things the practice would like you to know.                As part of the network's continuing commitment to caring for our patients, we have added more same day appointments and have extended office hours to meet your medical needs. After hours, on-call physicians are available via your primary care provider's main office line.               We encourage you to contact our office prior to seeking treatment to discuss your symptoms with the medical staff.  Together, we can determine the correct course of action.  A majority of non-emergent conditions such as: common cold, flu-like symptoms, fevers, strains/sprains, dislocations, minor burns, cuts and animal bites can be treated at [facility] facilities. Diagnostic testing is available at some sites.               Of course, if you are experiencing a life threatening medical emergency call 911 or proceed directly to the nearest emergency room.    Your nearest [facility] facility is conveniently located at:    [address] [address]  [phone]  SKIP THE WAIT  Conveniently offered at most [facility] locations  Emerado your spot online at [email or web address] or on the [facility] [employer]    Sincerely,    [facility]  Dept: [phone]

## 2024-07-31 NOTE — TELEPHONE ENCOUNTER
07/31/24 8:45 AM    Patient contacted post ED visit, second outreach attempt made. Message was left for patient to return a call to the VBI Department at [facility]: Phone [phone].    Thank you.  DIEGO GARCIA MA  [facility]

## 2024-08-02 NOTE — TELEPHONE ENCOUNTER
08/02/24 10:00 AM    Patient contacted post ED visit, phone outreaches were unsuccessful and a MyChart letter has been sent to the patient as follow-up.    Thank you.  Kimberly Sampson MA  [facility]

## 2024-08-10 DIAGNOSIS — N18.2 STAGE 2 CHRONIC KIDNEY DISEASE: ICD-10-CM

## 2024-08-10 DIAGNOSIS — E11.9 TYPE 2 DIABETES MELLITUS WITHOUT COMPLICATION, WITHOUT LONG-TERM CURRENT USE OF INSULIN (HCC): ICD-10-CM

## 2024-08-10 DIAGNOSIS — F32.1 MODERATE MAJOR DEPRESSION (HCC): ICD-10-CM

## 2024-08-10 DIAGNOSIS — E78.5 DYSLIPIDEMIA: ICD-10-CM

## 2024-08-11 RX ORDER — LOSARTAN POTASSIUM 25 MG/1
25 TABLET ORAL DAILY
Qty: 90 TABLET | Refills: 1 | Status: SHIPPED | OUTPATIENT
Start: 2024-08-11

## 2024-08-11 RX ORDER — METFORMIN HCL 500 MG
TABLET, EXTENDED RELEASE 24 HR ORAL
Qty: 180 TABLET | Refills: 1 | Status: SHIPPED | OUTPATIENT
Start: 2024-08-11

## 2024-08-11 RX ORDER — ATORVASTATIN CALCIUM 20 MG/1
20 TABLET, FILM COATED ORAL
Qty: 90 TABLET | Refills: 1 | Status: SHIPPED | OUTPATIENT
Start: 2024-08-11

## 2024-08-11 RX ORDER — ESCITALOPRAM OXALATE 5 MG/1
5 TABLET ORAL DAILY
Qty: 90 TABLET | Refills: 1 | Status: SHIPPED | OUTPATIENT
Start: 2024-08-11

## 2024-09-06 DIAGNOSIS — E11.9 TYPE 2 DIABETES MELLITUS WITHOUT COMPLICATION, WITHOUT LONG-TERM CURRENT USE OF INSULIN (HCC): ICD-10-CM

## 2024-09-06 RX ORDER — INSULIN ASPART 100 [IU]/ML
INJECTION, SOLUTION INTRAVENOUS; SUBCUTANEOUS
Qty: 15 ML | Refills: 0 | Status: SHIPPED | OUTPATIENT
Start: 2024-09-06

## 2024-11-12 DIAGNOSIS — G31.84 MILD COGNITIVE IMPAIRMENT: ICD-10-CM

## 2024-11-12 DIAGNOSIS — E11.9 TYPE 2 DIABETES MELLITUS WITHOUT COMPLICATION, WITHOUT LONG-TERM CURRENT USE OF INSULIN (HCC): ICD-10-CM

## 2024-11-13 RX ORDER — DONEPEZIL HYDROCHLORIDE 5 MG/1
5 TABLET, FILM COATED ORAL
Qty: 90 TABLET | Refills: 1 | Status: SHIPPED | OUTPATIENT
Start: 2024-11-13 | End: 2025-05-12

## 2024-11-13 RX ORDER — INSULIN ASPART 100 [IU]/ML
INJECTION, SOLUTION INTRAVENOUS; SUBCUTANEOUS
Qty: 15 ML | Refills: 2 | Status: SHIPPED | OUTPATIENT
Start: 2024-11-13

## 2025-02-09 ENCOUNTER — APPOINTMENT (EMERGENCY)
Dept: RADIOLOGY | Facility: HOSPITAL | Age: 80
End: 2025-02-09
Payer: COMMERCIAL

## 2025-02-09 ENCOUNTER — HOSPITAL ENCOUNTER (INPATIENT)
Facility: HOSPITAL | Age: 80
LOS: 3 days | Discharge: NURSING, REHAB OR LONG-TERM CARE | End: 2025-02-12
Attending: EMERGENCY MEDICINE | Admitting: STUDENT IN AN ORGANIZED HEALTH CARE EDUCATION/TRAINING PROGRAM
Payer: COMMERCIAL

## 2025-02-09 DIAGNOSIS — E11.9 TYPE 2 DIABETES MELLITUS WITHOUT COMPLICATION, WITH LONG-TERM CURRENT USE OF INSULIN (HCC): ICD-10-CM

## 2025-02-09 DIAGNOSIS — I10 HTN (HYPERTENSION): ICD-10-CM

## 2025-02-09 DIAGNOSIS — Z79.4 TYPE 2 DIABETES MELLITUS WITHOUT COMPLICATION, WITH LONG-TERM CURRENT USE OF INSULIN (HCC): ICD-10-CM

## 2025-02-09 DIAGNOSIS — R35.0 URINARY FREQUENCY: ICD-10-CM

## 2025-02-09 DIAGNOSIS — R73.9 HYPERGLYCEMIA: ICD-10-CM

## 2025-02-09 DIAGNOSIS — N17.9 AKI (ACUTE KIDNEY INJURY) (HCC): Primary | ICD-10-CM

## 2025-02-09 PROBLEM — G93.41 METABOLIC ENCEPHALOPATHY: Status: ACTIVE | Noted: 2025-02-09

## 2025-02-09 LAB
ALBUMIN SERPL BCG-MCNC: 4 G/DL (ref 3.5–5)
ALP SERPL-CCNC: 151 U/L (ref 34–104)
ALT SERPL W P-5'-P-CCNC: 18 U/L (ref 7–52)
ANION GAP SERPL CALCULATED.3IONS-SCNC: 11 MMOL/L (ref 4–13)
AST SERPL W P-5'-P-CCNC: 18 U/L (ref 13–39)
B-OH-BUTYR SERPL-MCNC: 0.46 MMOL/L (ref 0.02–0.27)
BACTERIA UR QL AUTO: ABNORMAL /HPF
BASE EX.OXY STD BLDV CALC-SCNC: 15.8 % (ref 60–80)
BASE EXCESS BLDV CALC-SCNC: 0.6 MMOL/L
BASOPHILS # BLD AUTO: 0.04 THOUSANDS/ÂΜL (ref 0–0.1)
BASOPHILS NFR BLD AUTO: 1 % (ref 0–1)
BILIRUB SERPL-MCNC: 0.64 MG/DL (ref 0.2–1)
BILIRUB UR QL STRIP: NEGATIVE
BUN SERPL-MCNC: 30 MG/DL (ref 5–25)
CALCIUM SERPL-MCNC: 10.4 MG/DL (ref 8.4–10.2)
CHLORIDE SERPL-SCNC: 99 MMOL/L (ref 96–108)
CLARITY UR: CLEAR
CO2 SERPL-SCNC: 28 MMOL/L (ref 21–32)
COLOR UR: ABNORMAL
CREAT SERPL-MCNC: 1.32 MG/DL (ref 0.6–1.3)
EOSINOPHIL # BLD AUTO: 0.09 THOUSAND/ÂΜL (ref 0–0.61)
EOSINOPHIL NFR BLD AUTO: 1 % (ref 0–6)
ERYTHROCYTE [DISTWIDTH] IN BLOOD BY AUTOMATED COUNT: 13.8 % (ref 11.6–15.1)
EST. AVERAGE GLUCOSE BLD GHB EST-MCNC: 237 MG/DL
GFR SERPL CREATININE-BSD FRML MDRD: 50 ML/MIN/1.73SQ M
GLUCOSE SERPL-MCNC: 375 MG/DL (ref 65–140)
GLUCOSE SERPL-MCNC: 412 MG/DL (ref 65–140)
GLUCOSE SERPL-MCNC: 464 MG/DL (ref 65–140)
GLUCOSE SERPL-MCNC: 552 MG/DL (ref 65–140)
GLUCOSE SERPL-MCNC: 560 MG/DL (ref 65–140)
GLUCOSE UR STRIP-MCNC: ABNORMAL MG/DL
HBA1C MFR BLD: 9.9 %
HCO3 BLDV-SCNC: 28.2 MMOL/L (ref 24–30)
HCT VFR BLD AUTO: 48.1 % (ref 36.5–49.3)
HGB BLD-MCNC: 15 G/DL (ref 12–17)
HGB UR QL STRIP.AUTO: ABNORMAL
IMM GRANULOCYTES # BLD AUTO: 0.01 THOUSAND/UL (ref 0–0.2)
IMM GRANULOCYTES NFR BLD AUTO: 0 % (ref 0–2)
KETONES UR STRIP-MCNC: NEGATIVE MG/DL
LEUKOCYTE ESTERASE UR QL STRIP: ABNORMAL
LYMPHOCYTES # BLD AUTO: 1.28 THOUSANDS/ÂΜL (ref 0.6–4.47)
LYMPHOCYTES NFR BLD AUTO: 18 % (ref 14–44)
MCH RBC QN AUTO: 26.6 PG (ref 26.8–34.3)
MCHC RBC AUTO-ENTMCNC: 31.2 G/DL (ref 31.4–37.4)
MCV RBC AUTO: 85 FL (ref 82–98)
MONOCYTES # BLD AUTO: 0.87 THOUSAND/ÂΜL (ref 0.17–1.22)
MONOCYTES NFR BLD AUTO: 12 % (ref 4–12)
NEUTROPHILS # BLD AUTO: 4.96 THOUSANDS/ÂΜL (ref 1.85–7.62)
NEUTS SEG NFR BLD AUTO: 68 % (ref 43–75)
NITRITE UR QL STRIP: NEGATIVE
NON-SQ EPI CELLS URNS QL MICRO: ABNORMAL /HPF
NRBC BLD AUTO-RTO: 0 /100 WBCS
O2 CT BLDV-SCNC: 3.3 ML/DL
PCO2 BLDV: 57.9 MM HG (ref 42–50)
PH BLDV: 7.31 [PH] (ref 7.3–7.4)
PH UR STRIP.AUTO: 6.5 [PH]
PLATELET # BLD AUTO: 209 THOUSANDS/UL (ref 149–390)
PMV BLD AUTO: 10.6 FL (ref 8.9–12.7)
PO2 BLDV: 14.9 MM HG (ref 35–45)
POTASSIUM SERPL-SCNC: 4.5 MMOL/L (ref 3.5–5.3)
PROT SERPL-MCNC: 8.9 G/DL (ref 6.4–8.4)
PROT UR STRIP-MCNC: ABNORMAL MG/DL
RBC # BLD AUTO: 5.64 MILLION/UL (ref 3.88–5.62)
RBC #/AREA URNS AUTO: ABNORMAL /HPF
SODIUM SERPL-SCNC: 138 MMOL/L (ref 135–147)
SP GR UR STRIP.AUTO: 1.02 (ref 1–1.03)
UROBILINOGEN UR STRIP-ACNC: <2 MG/DL
WBC # BLD AUTO: 7.25 THOUSAND/UL (ref 4.31–10.16)
WBC #/AREA URNS AUTO: ABNORMAL /HPF

## 2025-02-09 PROCEDURE — 36415 COLL VENOUS BLD VENIPUNCTURE: CPT

## 2025-02-09 PROCEDURE — 93005 ELECTROCARDIOGRAM TRACING: CPT

## 2025-02-09 PROCEDURE — 81001 URINALYSIS AUTO W/SCOPE: CPT

## 2025-02-09 PROCEDURE — 82010 KETONE BODYS QUAN: CPT

## 2025-02-09 PROCEDURE — 82948 REAGENT STRIP/BLOOD GLUCOSE: CPT

## 2025-02-09 PROCEDURE — 99223 1ST HOSP IP/OBS HIGH 75: CPT | Performed by: STUDENT IN AN ORGANIZED HEALTH CARE EDUCATION/TRAINING PROGRAM

## 2025-02-09 PROCEDURE — 99284 EMERGENCY DEPT VISIT MOD MDM: CPT

## 2025-02-09 PROCEDURE — 82805 BLOOD GASES W/O2 SATURATION: CPT

## 2025-02-09 PROCEDURE — 99285 EMERGENCY DEPT VISIT HI MDM: CPT | Performed by: EMERGENCY MEDICINE

## 2025-02-09 PROCEDURE — 83036 HEMOGLOBIN GLYCOSYLATED A1C: CPT | Performed by: STUDENT IN AN ORGANIZED HEALTH CARE EDUCATION/TRAINING PROGRAM

## 2025-02-09 PROCEDURE — 80053 COMPREHEN METABOLIC PANEL: CPT

## 2025-02-09 PROCEDURE — 96365 THER/PROPH/DIAG IV INF INIT: CPT

## 2025-02-09 PROCEDURE — 85025 COMPLETE CBC W/AUTO DIFF WBC: CPT

## 2025-02-09 PROCEDURE — 96375 TX/PRO/DX INJ NEW DRUG ADDON: CPT

## 2025-02-09 PROCEDURE — 71046 X-RAY EXAM CHEST 2 VIEWS: CPT

## 2025-02-09 RX ORDER — AMLODIPINE BESYLATE 5 MG/1
5 TABLET ORAL DAILY
Status: DISCONTINUED | OUTPATIENT
Start: 2025-02-09 | End: 2025-02-12 | Stop reason: HOSPADM

## 2025-02-09 RX ORDER — ATORVASTATIN CALCIUM 20 MG/1
20 TABLET, FILM COATED ORAL
Status: DISCONTINUED | OUTPATIENT
Start: 2025-02-09 | End: 2025-02-12 | Stop reason: HOSPADM

## 2025-02-09 RX ORDER — INSULIN GLARGINE 100 [IU]/ML
20 INJECTION, SOLUTION SUBCUTANEOUS
Status: DISCONTINUED | OUTPATIENT
Start: 2025-02-09 | End: 2025-02-10

## 2025-02-09 RX ORDER — SODIUM CHLORIDE, SODIUM GLUCONATE, SODIUM ACETATE, POTASSIUM CHLORIDE, MAGNESIUM CHLORIDE, SODIUM PHOSPHATE, DIBASIC, AND POTASSIUM PHOSPHATE .53; .5; .37; .037; .03; .012; .00082 G/100ML; G/100ML; G/100ML; G/100ML; G/100ML; G/100ML; G/100ML
1000 INJECTION, SOLUTION INTRAVENOUS ONCE
Status: COMPLETED | OUTPATIENT
Start: 2025-02-09 | End: 2025-02-09

## 2025-02-09 RX ORDER — SODIUM CHLORIDE, SODIUM GLUCONATE, SODIUM ACETATE, POTASSIUM CHLORIDE, MAGNESIUM CHLORIDE, SODIUM PHOSPHATE, DIBASIC, AND POTASSIUM PHOSPHATE .53; .5; .37; .037; .03; .012; .00082 G/100ML; G/100ML; G/100ML; G/100ML; G/100ML; G/100ML; G/100ML
125 INJECTION, SOLUTION INTRAVENOUS CONTINUOUS
Status: DISPENSED | OUTPATIENT
Start: 2025-02-09 | End: 2025-02-11

## 2025-02-09 RX ORDER — HEPARIN SODIUM 5000 [USP'U]/ML
5000 INJECTION, SOLUTION INTRAVENOUS; SUBCUTANEOUS EVERY 8 HOURS SCHEDULED
Status: DISCONTINUED | OUTPATIENT
Start: 2025-02-09 | End: 2025-02-12 | Stop reason: HOSPADM

## 2025-02-09 RX ORDER — INSULIN LISPRO 100 [IU]/ML
5 INJECTION, SOLUTION INTRAVENOUS; SUBCUTANEOUS
Status: DISCONTINUED | OUTPATIENT
Start: 2025-02-10 | End: 2025-02-12 | Stop reason: HOSPADM

## 2025-02-09 RX ORDER — INSULIN LISPRO 100 [IU]/ML
1-5 INJECTION, SOLUTION INTRAVENOUS; SUBCUTANEOUS
Status: DISCONTINUED | OUTPATIENT
Start: 2025-02-10 | End: 2025-02-09

## 2025-02-09 RX ORDER — ONDANSETRON 2 MG/ML
4 INJECTION INTRAMUSCULAR; INTRAVENOUS ONCE
Status: COMPLETED | OUTPATIENT
Start: 2025-02-09 | End: 2025-02-09

## 2025-02-09 RX ORDER — HYDRALAZINE HYDROCHLORIDE 20 MG/ML
5 INJECTION INTRAMUSCULAR; INTRAVENOUS EVERY 6 HOURS PRN
Status: DISCONTINUED | OUTPATIENT
Start: 2025-02-09 | End: 2025-02-12 | Stop reason: HOSPADM

## 2025-02-09 RX ORDER — DONEPEZIL HYDROCHLORIDE 5 MG/1
5 TABLET, FILM COATED ORAL
Status: DISCONTINUED | OUTPATIENT
Start: 2025-02-09 | End: 2025-02-12 | Stop reason: HOSPADM

## 2025-02-09 RX ORDER — INSULIN LISPRO 100 [IU]/ML
1-5 INJECTION, SOLUTION INTRAVENOUS; SUBCUTANEOUS
Status: DISCONTINUED | OUTPATIENT
Start: 2025-02-10 | End: 2025-02-12 | Stop reason: HOSPADM

## 2025-02-09 RX ORDER — ESCITALOPRAM OXALATE 10 MG/1
5 TABLET ORAL DAILY
Status: DISCONTINUED | OUTPATIENT
Start: 2025-02-10 | End: 2025-02-12 | Stop reason: HOSPADM

## 2025-02-09 RX ORDER — INSULIN LISPRO 100 [IU]/ML
5 INJECTION, SOLUTION INTRAVENOUS; SUBCUTANEOUS
Status: DISCONTINUED | OUTPATIENT
Start: 2025-02-09 | End: 2025-02-09

## 2025-02-09 RX ORDER — INSULIN LISPRO 100 [IU]/ML
1-5 INJECTION, SOLUTION INTRAVENOUS; SUBCUTANEOUS
Status: DISCONTINUED | OUTPATIENT
Start: 2025-02-09 | End: 2025-02-12 | Stop reason: HOSPADM

## 2025-02-09 RX ADMIN — INSULIN LISPRO 4 UNITS: 100 INJECTION, SOLUTION INTRAVENOUS; SUBCUTANEOUS at 21:54

## 2025-02-09 RX ADMIN — ATORVASTATIN CALCIUM 20 MG: 20 TABLET, FILM COATED ORAL at 22:09

## 2025-02-09 RX ADMIN — SODIUM CHLORIDE, SODIUM GLUCONATE, SODIUM ACETATE, POTASSIUM CHLORIDE, MAGNESIUM CHLORIDE, SODIUM PHOSPHATE, DIBASIC, AND POTASSIUM PHOSPHATE 100 ML/HR: .53; .5; .37; .037; .03; .012; .00082 INJECTION, SOLUTION INTRAVENOUS at 19:40

## 2025-02-09 RX ADMIN — HEPARIN SODIUM 5000 UNITS: 5000 INJECTION INTRAVENOUS; SUBCUTANEOUS at 22:08

## 2025-02-09 RX ADMIN — ONDANSETRON 4 MG: 2 INJECTION INTRAMUSCULAR; INTRAVENOUS at 17:20

## 2025-02-09 RX ADMIN — DONEPEZIL HYDROCHLORIDE 5 MG: 5 TABLET ORAL at 22:09

## 2025-02-09 RX ADMIN — INSULIN GLARGINE 20 UNITS: 100 INJECTION, SOLUTION SUBCUTANEOUS at 19:40

## 2025-02-09 RX ADMIN — SODIUM CHLORIDE, SODIUM GLUCONATE, SODIUM ACETATE, POTASSIUM CHLORIDE, MAGNESIUM CHLORIDE, SODIUM PHOSPHATE, DIBASIC, AND POTASSIUM PHOSPHATE 1000 ML: .53; .5; .37; .037; .03; .012; .00082 INJECTION, SOLUTION INTRAVENOUS at 16:14

## 2025-02-09 RX ADMIN — AMLODIPINE BESYLATE 5 MG: 5 TABLET ORAL at 19:35
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
## 2025-02-09 NOTE — ED ATTENDING ATTESTATION
2/9/2025  I, Chucky Ennis MD, saw and evaluated the patient. I have discussed the patient with the resident/non-physician practitioner and agree with the resident's/non-physician practitioner's findings, Plan of Care, and MDM as documented in the resident's/non-physician practitioner's note, except where noted. All available labs and Radiology studies were reviewed.  I was present for key portions of any procedure(s) performed by the resident/non-physician practitioner and I was immediately available to provide assistance.       At this point I agree with the current assessment done in the Emergency Department.  I have conducted an independent evaluation of this patient a history and physical is as follows:    ED Course     79-year-old male, history of insulin-dependent diabetes, limited historian, presenting to the emergency department for evaluation of 3 to 4-day history of general malaise, decreased appetite and not taking anything by mouth, with reported hyperglycemia.  No reported vomiting.  Patient states that he hurts everywhere.  No recent trauma.    Elderly male lying recumbent in the stretcher.  He appears mildly cachectic.  He is not tachypneic or  small breathing.  Head is normocephalic and atraumatic.  Eyelids lashes normal.  Mucous membranes are dry.  Neck is supple.  Lungs are clear bilaterally.  Heart is regular rate and rhythm with no murmurs rubs or gallops.  Abdomen is nondistended, soft and nontender.  Extremities unremarkable.    MEDICAL DECISION MAKING    Number and Complexity of Problems  Differential diagnosis: Hyperglycemia, possible infection including pneumonia or urinary tract infection.  Complications of hyperglycemia including diabetic ketoacidosis.    Medical Decision Making Data  External documents reviewed: Reviewed last primary care physician office visit from 7/15/2024.  My EKG interpretation: Normal sinus rhythm at 78 bpm.  No acute ST or T wave changes.  My X-ray  interpretation: No acute cardiopulmonary disease.      XR chest 2 views   ED Interpretation   No acute cardiopulmonary disease.          Labs Reviewed   CBC AND DIFFERENTIAL - Abnormal       Result Value Ref Range Status    WBC 7.25  4.31 - 10.16 Thousand/uL Final    RBC 5.64 (*) 3.88 - 5.62 Million/uL Final    Hemoglobin 15.0  12.0 - 17.0 g/dL Final    Hematocrit 48.1  36.5 - 49.3 % Final    MCV 85  82 - 98 fL Final    MCH 26.6 (*) 26.8 - 34.3 pg Final    MCHC 31.2 (*) 31.4 - 37.4 g/dL Final    RDW 13.8  11.6 - 15.1 % Final    MPV 10.6  8.9 - 12.7 fL Final    Platelets 209  149 - 390 Thousands/uL Final    nRBC 0  /100 WBCs Final    Segmented % 68  43 - 75 % Final    Immature Grans % 0  0 - 2 % Final    Lymphocytes % 18  14 - 44 % Final    Monocytes % 12  4 - 12 % Final    Eosinophils Relative 1  0 - 6 % Final    Basophils Relative 1  0 - 1 % Final    Absolute Neutrophils 4.96  1.85 - 7.62 Thousands/µL Final    Absolute Immature Grans 0.01  0.00 - 0.20 Thousand/uL Final    Absolute Lymphocytes 1.28  0.60 - 4.47 Thousands/µL Final    Absolute Monocytes 0.87  0.17 - 1.22 Thousand/µL Final    Eosinophils Absolute 0.09  0.00 - 0.61 Thousand/µL Final    Basophils Absolute 0.04  0.00 - 0.10 Thousands/µL Final   COMPREHENSIVE METABOLIC PANEL - Abnormal    Sodium 138  135 - 147 mmol/L Final    Potassium 4.5  3.5 - 5.3 mmol/L Final    Chloride 99  96 - 108 mmol/L Final    CO2 28  21 - 32 mmol/L Final    ANION GAP 11  4 - 13 mmol/L Final    BUN 30 (*) 5 - 25 mg/dL Final    Creatinine 1.32 (*) 0.60 - 1.30 mg/dL Final    Comment: Standardized to IDMS reference method    Glucose 552 (*) 65 - 140 mg/dL Final    Comment: If the patient is fasting, the ADA then defines impaired fasting glucose as > 100 mg/dL and diabetes as > or equal to 123 mg/dL.    Calcium 10.4 (*) 8.4 - 10.2 mg/dL Final    AST 18  13 - 39 U/L Final    ALT 18  7 - 52 U/L Final    Comment: Specimen collection should occur prior to Sulfasalazine administration due  to the potential for falsely depressed results.     Alkaline Phosphatase 151 (*) 34 - 104 U/L Final    Total Protein 8.9 (*) 6.4 - 8.4 g/dL Final    Albumin 4.0  3.5 - 5.0 g/dL Final    Total Bilirubin 0.64  0.20 - 1.00 mg/dL Final    Comment: Use of this assay is not recommended for patients undergoing treatment with eltrombopag due to the potential for falsely elevated results.  N-acetyl-p-benzoquinone imine (metabolite of Acetaminophen) will generate erroneously low results in samples for patients that have taken an overdose of Acetaminophen.    eGFR 50  ml/min/1.73sq m Final    Narrative:     National Kidney [employer] Foundation guidelines for Chronic Kidney Disease (CKD):     Stage 1 with normal or high GFR (GFR > 90 mL/min/1.73 square meters)    Stage 2 Mild CKD (GFR = 60-89 mL/min/1.73 square meters)    Stage 3A Moderate CKD (GFR = 45-59 mL/min/1.73 square meters)    Stage 3B Moderate CKD (GFR = 30-44 mL/min/1.73 square meters)    Stage 4 Severe CKD (GFR = 15-29 mL/min/1.73 square meters)    Stage 5 End Stage CKD (GFR <15 mL/min/1.73 square meters)  Note: GFR calculation is accurate only with a steady state creatinine   UA W REFLEX TO MICROSCOPIC WITH REFLEX TO CULTURE - Abnormal    Color, UA Light Yellow   Final    Clarity, UA Clear   Final    Specific Gravity, UA 1.023  1.003 - 1.030 Final    Comment: High concentrations of glucose or protein may affect the specific gravity    pH, UA 6.5  4.5, 5.0, 5.5, 6.0, 6.5, 7.0, 7.5, 8.0 Final    Leukocytes, UA   (*) Negative Final    Value: Elevated glucose may cause decreased leukocyte values. See urine microscopic for UWBC result    Nitrite, UA Negative  Negative Final    Protein,  (2+) (*) Negative mg/dl Final    Glucose, UA >=1000 (1%) (*) Negative mg/dl Final    Comment: Elevated glucose may cause false negative leukocyte esterase    Ketones, UA Negative  Negative mg/dl Final    Urobilinogen, UA <2.0  <2.0 mg/dl mg/dl Final    Bilirubin, UA Negative   Negative Final    Occult Blood, UA Large (*) Negative Final   BETA HYDROXYBUTYRATE - Abnormal    Beta- Hydroxybutyrate 0.46 (*) 0.02 - 0.27 mmol/L Final   BLOOD GAS, VENOUS - Abnormal    pH, Leonel 7.306  7.300 - 7.400 Final    pCO2, Leonel 57.9 (*) 42.0 - 50.0 mm Hg Final    pO2, Leonel 14.9 (*) 35.0 - 45.0 mm Hg Final    HCO3, Leonel 28.2  24 - 30 mmol/L Final    Base Excess, Leonel 0.6  mmol/L Final    O2 Content, Leonel 3.3  ml/dL Final    O2 HGB, VENOUS 15.8 (*) 60.0 - 80.0 % Final   POCT GLUCOSE - Abnormal    POC Glucose 560 (*) 65 - 140 mg/dl Final    Comment: RN NOTIFIED/CRIT KADEN   POCT GLUCOSE - Abnormal    POC Glucose 412 (*) 65 - 140 mg/dl Final    Comment: CRITICAL VALUE NOTED-   URINE MICROSCOPIC       Labs reviewed by me are significant for:  Hyperglycemia, acute kidney injury.    Discussed case with: Medicine  Considered admission for: Hyperglycemia, dehydration, acute kidney injury.    Treatment and Disposition  ED course: Patient remained hemodynamically stable in the emergency department.  Patient found to be hyperglycemic without evidence of diabetic ketoacidosis.  Patient with acute kidney injury.  Plan is admission to medicine for IV fluid hydration, management of hyperglycemia, and further evaluation.  Shared decision making: Patient agreeable with plan.  Code status: Full code.              Critical Care Time  Procedures

## 2025-02-10 LAB
ANION GAP SERPL CALCULATED.3IONS-SCNC: 7 MMOL/L (ref 4–13)
ATRIAL RATE: 78 BPM
BASOPHILS # BLD AUTO: 0.04 THOUSANDS/ÂΜL (ref 0–0.1)
BASOPHILS NFR BLD AUTO: 1 % (ref 0–1)
BUN SERPL-MCNC: 23 MG/DL (ref 5–25)
CALCIUM SERPL-MCNC: 10.2 MG/DL (ref 8.4–10.2)
CHLORIDE SERPL-SCNC: 102 MMOL/L (ref 96–108)
CO2 SERPL-SCNC: 34 MMOL/L (ref 21–32)
CREAT SERPL-MCNC: 1.13 MG/DL (ref 0.6–1.3)
EOSINOPHIL # BLD AUTO: 0.17 THOUSAND/ÂΜL (ref 0–0.61)
EOSINOPHIL NFR BLD AUTO: 2 % (ref 0–6)
ERYTHROCYTE [DISTWIDTH] IN BLOOD BY AUTOMATED COUNT: 13.8 % (ref 11.6–15.1)
GFR SERPL CREATININE-BSD FRML MDRD: 61 ML/MIN/1.73SQ M
GLUCOSE SERPL-MCNC: 136 MG/DL (ref 65–140)
GLUCOSE SERPL-MCNC: 156 MG/DL (ref 65–140)
GLUCOSE SERPL-MCNC: 188 MG/DL (ref 65–140)
GLUCOSE SERPL-MCNC: 195 MG/DL (ref 65–140)
GLUCOSE SERPL-MCNC: 208 MG/DL (ref 65–140)
GLUCOSE SERPL-MCNC: 217 MG/DL (ref 65–140)
GLUCOSE SERPL-MCNC: 220 MG/DL (ref 65–140)
GLUCOSE SERPL-MCNC: 244 MG/DL (ref 65–140)
HCT VFR BLD AUTO: 46.2 % (ref 36.5–49.3)
HGB BLD-MCNC: 14.5 G/DL (ref 12–17)
IMM GRANULOCYTES # BLD AUTO: 0.02 THOUSAND/UL (ref 0–0.2)
IMM GRANULOCYTES NFR BLD AUTO: 0 % (ref 0–2)
LYMPHOCYTES # BLD AUTO: 1.5 THOUSANDS/ÂΜL (ref 0.6–4.47)
LYMPHOCYTES NFR BLD AUTO: 19 % (ref 14–44)
MCH RBC QN AUTO: 26.7 PG (ref 26.8–34.3)
MCHC RBC AUTO-ENTMCNC: 31.4 G/DL (ref 31.4–37.4)
MCV RBC AUTO: 85 FL (ref 82–98)
MONOCYTES # BLD AUTO: 0.89 THOUSAND/ÂΜL (ref 0.17–1.22)
MONOCYTES NFR BLD AUTO: 11 % (ref 4–12)
NEUTROPHILS # BLD AUTO: 5.25 THOUSANDS/ÂΜL (ref 1.85–7.62)
NEUTS SEG NFR BLD AUTO: 67 % (ref 43–75)
NRBC BLD AUTO-RTO: 0 /100 WBCS
P AXIS: 53 DEGREES
PLATELET # BLD AUTO: 207 THOUSANDS/UL (ref 149–390)
PMV BLD AUTO: 9.8 FL (ref 8.9–12.7)
POTASSIUM SERPL-SCNC: 3.6 MMOL/L (ref 3.5–5.3)
PR INTERVAL: 132 MS
QRS AXIS: -31 DEGREES
QRSD INTERVAL: 92 MS
QT INTERVAL: 384 MS
QTC INTERVAL: 437 MS
RBC # BLD AUTO: 5.44 MILLION/UL (ref 3.88–5.62)
SODIUM SERPL-SCNC: 143 MMOL/L (ref 135–147)
T WAVE AXIS: 68 DEGREES
VENTRICULAR RATE: 78 BPM
WBC # BLD AUTO: 7.87 THOUSAND/UL (ref 4.31–10.16)

## 2025-02-10 PROCEDURE — 80048 BASIC METABOLIC PNL TOTAL CA: CPT | Performed by: STUDENT IN AN ORGANIZED HEALTH CARE EDUCATION/TRAINING PROGRAM

## 2025-02-10 PROCEDURE — 99232 SBSQ HOSP IP/OBS MODERATE 35: CPT | Performed by: PHYSICIAN ASSISTANT

## 2025-02-10 PROCEDURE — 92610 EVALUATE SWALLOWING FUNCTION: CPT

## 2025-02-10 PROCEDURE — 85025 COMPLETE CBC W/AUTO DIFF WBC: CPT | Performed by: STUDENT IN AN ORGANIZED HEALTH CARE EDUCATION/TRAINING PROGRAM

## 2025-02-10 PROCEDURE — 93010 ELECTROCARDIOGRAM REPORT: CPT | Performed by: INTERNAL MEDICINE

## 2025-02-10 PROCEDURE — 82948 REAGENT STRIP/BLOOD GLUCOSE: CPT

## 2025-02-10 PROCEDURE — 36415 COLL VENOUS BLD VENIPUNCTURE: CPT | Performed by: STUDENT IN AN ORGANIZED HEALTH CARE EDUCATION/TRAINING PROGRAM

## 2025-02-10 RX ORDER — INSULIN GLARGINE 100 [IU]/ML
24 INJECTION, SOLUTION SUBCUTANEOUS
Status: DISCONTINUED | OUTPATIENT
Start: 2025-02-10 | End: 2025-02-11

## 2025-02-10 RX ADMIN — ESCITALOPRAM OXALATE 5 MG: 5 TABLET, FILM COATED ORAL at 11:15

## 2025-02-10 RX ADMIN — INSULIN LISPRO 5 UNITS: 100 INJECTION, SOLUTION INTRAVENOUS; SUBCUTANEOUS at 07:58

## 2025-02-10 RX ADMIN — INSULIN LISPRO 2 UNITS: 100 INJECTION, SOLUTION INTRAVENOUS; SUBCUTANEOUS at 07:57

## 2025-02-10 RX ADMIN — DONEPEZIL HYDROCHLORIDE 5 MG: 5 TABLET ORAL at 22:18

## 2025-02-10 RX ADMIN — SODIUM CHLORIDE, SODIUM GLUCONATE, SODIUM ACETATE, POTASSIUM CHLORIDE, MAGNESIUM CHLORIDE, SODIUM PHOSPHATE, DIBASIC, AND POTASSIUM PHOSPHATE 100 ML/HR: .53; .5; .37; .037; .03; .012; .00082 INJECTION, SOLUTION INTRAVENOUS at 17:09

## 2025-02-10 RX ADMIN — HEPARIN SODIUM 5000 UNITS: 5000 INJECTION INTRAVENOUS; SUBCUTANEOUS at 22:18

## 2025-02-10 RX ADMIN — INSULIN LISPRO 1 UNITS: 100 INJECTION, SOLUTION INTRAVENOUS; SUBCUTANEOUS at 11:23

## 2025-02-10 RX ADMIN — AMLODIPINE BESYLATE 5 MG: 5 TABLET ORAL at 11:15

## 2025-02-10 RX ADMIN — INSULIN LISPRO 1 UNITS: 100 INJECTION, SOLUTION INTRAVENOUS; SUBCUTANEOUS at 22:18

## 2025-02-10 RX ADMIN — INSULIN GLARGINE 24 UNITS: 100 INJECTION, SOLUTION SUBCUTANEOUS at 22:20

## 2025-02-10 RX ADMIN — INSULIN LISPRO 5 UNITS: 100 INJECTION, SOLUTION INTRAVENOUS; SUBCUTANEOUS at 11:22

## 2025-02-10 RX ADMIN — ATORVASTATIN CALCIUM 20 MG: 20 TABLET, FILM COATED ORAL at 22:18
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
## 2025-02-10 NOTE — ASSESSMENT & PLAN NOTE
SBP variable: 116-166   PTA losartan held due to RENZO  C/w recently added amlodipine 5 mg q day   PRN hydralazine

## 2025-02-10 NOTE — PROGRESS NOTES
Progress Note - Hospitalist   Name: Renard Antoine 79 y.o. male I MRN: [id number]  Unit/Bed#: [facility] I Date of Admission: 2/9/2025   Date of Service: 2/10/2025 I Hospital Day: 1    Assessment & Plan  Type 2 diabetes mellitus with hyperglycemia, with long-term current use of insulin ([facility])  Lab Results   Component Value Date    HGBA1C 9.9 (H) 02/09/2025     Recent Labs     02/10/25  0755 02/10/25  1108 02/10/25  1120 02/10/25  1557   POCGLU 244* 195* 188* 136     Blood Sugar Average: Last 72 hrs:  (P) 299.9    H/o DM type 2 maintained on insulin & metformin PTA who presents to ED w/ hyperglycemia, polyuria and weakness 2/2 noncompliance w/ insulin therapy.  Normal AG, mildly elevated beta hydroxybutyrate at 0.46; not consistent w/ DKA  BG >500 on admit  Increase Lantus from 20 to 24 u qhs   C/w Lispro 5 U TID & SSI AC/QHS   Hold PTA metformin  Diabetic diet  Hypoglycemia protocol  Would benefit from ambulatory referral to endocrinology at discharge vs. close PCP follow-up  CM consulted for safe dispo planning; unable to self administer medication safely  RENZO (acute kidney injury) ([facility])  Recent Labs     02/09/25  1554 02/10/25  0423   CREATININE 1.32* 1.13     Baseline Cr. 0.8-1.0; peak Cr./ Cr. on admit 1.32   Suspect prerenal etiology w/ poor PO intake & ARB use   UA w/ 2+ protein, large blood, negative nitrites, elevated glucose  Hold losartan & metformin   C/w IVFs   I/O  Trend BMP; avoid nephrotoxins/hypotension  Metabolic encephalopathy  2/2 dehydration, RENZO and hyperglycemia  CXR benign   No focal neurodeficits  Supportive care as above; near baseline mentation now   Mild cognitive impairment  Outpatient notes reviewed  C/w PTA donepezil & Lexapro   Case management consulted for safe dispo plan; daughter is POA and notes pt cannot return home as he needs assistance with ADLs and was residing with his ex-wife  Essential hypertension  SBP variable: 116-166   PTA losartan held due to RENZO  C/w recently added  "amlodipine 5 mg q day   PRN hydralazine  History of atrial fibrillation  Currently in NSR  Not maintained on AV yi agents or AC  Monitor  Oropharyngeal dysphagia  H/o   ST evaluated   C/w puree with nectar thick liquids   NPH (normal pressure hydrocephalus) (HCC)  H/o per chart review  S/p shunt; outpt routine neurosurgery f/u       VTE Pharmacologic Prophylaxis:   Moderate Risk (Score 3-4) - Pharmacological DVT Prophylaxis Ordered: heparin.    Mobility:      -HLM Goal NOT achieved. Continue with multidisciplinary rounding and encourage appropriate mobility to improve upon -HLM goals.    Patient Centered Rounds: I performed bedside rounds with nursing staff today.   Discussions with Specialists or Other Care Team Provider: Care reviewed with case management, nursing    Education and Discussions with Family / Patient: Updated  (daughter) via phone.    Current Length of Stay: 1 day(s)  Current Patient Status: Inpatient   Certification Statement: The patient will continue to require additional inpatient hospital stay due to pending diabetic medication titration and safe dispo plan  Discharge Plan: Anticipate discharge in 24-48 hrs to pending safe placement        Code Status: Level 3 - DNAR and DNI    Subjective   Ate juice & muffin only for breakfast per nursing. Pt w/o acute complaints. He is aware he is in the hospital \"because I am sick.\" Denies pain, SOB, abd pain, N/V, or loose stool.     Objective :  Temp:  [98.3 °F (36.8 °C)] 98.3 °F (36.8 °C)  HR:  [61-77] 67  BP: (102-196)/(63-91) 116/68  Resp:  [12-34] 14  SpO2:  [91 %-100 %] 94 %  O2 Device: None (Room air)    There is no height or weight on file to calculate BMI.     Input and Output Summary (last 24 hours):     Intake/Output Summary (Last 24 hours) at 2/10/2025 1616  Last data filed at 2/9/2025 2201  Gross per 24 hour   Intake --   Output 500 ml   Net -500 ml       Physical Exam  Constitutional:       General: He is not in acute " distress.     Appearance: He is not toxic-appearing.      Comments: Pleasant, thin, frail, temporal wasting, thinning orbital fat fads, prominent bones in chest wall    HENT:      Head: Normocephalic.      Right Ear: External ear normal.      Left Ear: External ear normal.      Nose: Nose normal.      Mouth/Throat:      Mouth: Mucous membranes are moist.      Pharynx: Oropharynx is clear.   Eyes:      Extraocular Movements: Extraocular movements intact.      Conjunctiva/sclera: Conjunctivae normal.   Cardiovascular:      Rate and Rhythm: Normal rate and regular rhythm.      Pulses: Normal pulses.      Heart sounds: Normal heart sounds.   Pulmonary:      Effort: Pulmonary effort is normal. No respiratory distress.      Breath sounds: Normal breath sounds. No wheezing or rales.   Abdominal:      General: Abdomen is flat. Bowel sounds are normal. There is no distension.      Palpations: Abdomen is soft.      Tenderness: There is no abdominal tenderness. There is no guarding.   Musculoskeletal:         General: No swelling. Normal range of motion.      Cervical back: Normal range of motion.   Skin:     General: Skin is warm and dry.      Coloration: Skin is not jaundiced.      Findings: No bruising or lesion.   Neurological:      General: No focal deficit present.      Mental Status: He is alert. Mental status is at baseline.      Comments: AAO x 2    Psychiatric:         Mood and Affect: Mood normal.         Behavior: Behavior normal.           Lab Results: I have reviewed the following results:   Results from last 7 days   Lab Units 02/10/25  0423   WBC Thousand/uL 7.87   HEMOGLOBIN g/dL 14.5   HEMATOCRIT % 46.2   PLATELETS Thousands/uL 207   SEGS PCT % 67   LYMPHO PCT % 19   MONO PCT % 11   EOS PCT % 2     Results from last 7 days   Lab Units 02/10/25  0423 02/09/25  1554   SODIUM mmol/L 143 138   POTASSIUM mmol/L 3.6 4.5   CHLORIDE mmol/L 102 99   CO2 mmol/L 34* 28   BUN mg/dL 23 30*   CREATININE mg/dL 1.13 1.32*    ANION GAP mmol/L 7 11   CALCIUM mg/dL 10.2 10.4*   ALBUMIN g/dL  --  4.0   TOTAL BILIRUBIN mg/dL  --  0.64   ALK PHOS U/L  --  151*   ALT U/L  --  18   AST U/L  --  18   GLUCOSE RANDOM mg/dL 220* 552*         Results from last 7 days   Lab Units 02/10/25  1557 02/10/25  1120 02/10/25  1108 02/10/25  0755 02/10/25  0408 02/10/25  0049 02/09/25  2152 02/09/25  1940 02/09/25  1802 02/09/25  1538   POC GLUCOSE mg/dl 136 188* 195* 244* 208* 217* 375* 464* 412* 560*     Results from last 7 days   Lab Units 02/09/25  1554   HEMOGLOBIN A1C % 9.9*           Recent Cultures (last 7 days):         Imaging Results Review: I reviewed radiology reports from this admission including: chest xray.  Other Study Results Review: No additional pertinent studies reviewed.    Last 24 Hours Medication List:     Current Facility-Administered Medications:     amLODIPine (NORVASC) tablet 5 mg, Daily    atorvastatin (LIPITOR) tablet 20 mg, HS    donepezil (ARICEPT) tablet 5 mg, HS    escitalopram (LEXAPRO) tablet 5 mg, Daily    heparin (porcine) subcutaneous injection 5,000 Units, Q8H MAURO    hydrALAZINE (APRESOLINE) injection 5 mg, Q6H PRN    insulin glargine (LANTUS) subcutaneous injection 24 Units 0.24 mL, HS    insulin lispro (HumALOG/ADMELOG) 100 units/mL subcutaneous injection 1-5 Units, TID AC **AND** Fingerstick Glucose (POCT), TID AC    insulin lispro (HumALOG/ADMELOG) 100 units/mL subcutaneous injection 1-5 Units, HS    insulin lispro (HumALOG/ADMELOG) 100 units/mL subcutaneous injection 5 Units, TID With Meals    multi-electrolyte (PLASMALYTE-A/ISOLYTE-S PH 7.4) IV solution, Continuous, Last Rate: 100 mL/hr (02/09/25 1940)    Administrative Statements   Today, Patient Was Seen By: Shivani Brown PA-C  I have spent a total time of 45 minutes in caring for this patient on the day of the visit/encounter including Diagnostic results, Prognosis, Instructions for management, Patient and family education, Importance of tx compliance, Risk  factor reductions, Impressions, Counseling / Coordination of care, Documenting in the medical record, Reviewing / ordering tests, medicine, procedures  , Obtaining or reviewing history  , and Communicating with other healthcare professionals .    **Please Note: This note may have been constructed using a voice recognition system.**

## 2025-02-10 NOTE — UTILIZATION REVIEW
"NOTIFICATION OF INPATIENT ADMISSION   AUTHORIZATION REQUEST   SERVICING FACILITY:   [facility]  Address: [address] [place] [address]  Tax ID: [id number]  NPI: [id number] ATTENDING PROVIDER:  Attending Name and NPI#: Rashad Barnard Do [[id number]]  Address: [address] [place] [address]  Phone: [phone]   ADMISSION INFORMATION:  Place of Service: Inpatient [facility]  Place of Service Code: 21  Inpatient Admission Date/Time: 2/9/25  6:13 PM  Discharge Date/Time: No discharge date for patient encounter.  Admitting Diagnosis Code/Description:  Weakness [R53.1]     UTILIZATION REVIEW CONTACT:  Genesis \"Stacy\"Ludin Utilization   Network Utilization Review Department  Phone: [phone]  Fax: [phone]  Email: [email or web address]  Contact for approvals/pending authorizations, clinical reviews, and discharge.     PHYSICIAN ADVISORY SERVICES:  Medical Necessity Denial & [facility] Review  Phone: [phone]  Fax: [phone]  Email: [email or web address]     DISCHARGE SUPPORT TEAM:  For Patients Discharge Needs & Updates  Phone: [phone] opt. 2 Fax: [phone]  Email: [email or web address]     "

## 2025-02-10 NOTE — ASSESSMENT & PLAN NOTE
Lab Results   Component Value Date    HGBA1C 9.9 (H) 02/09/2025     Recent Labs     02/10/25  0755 02/10/25  1108 02/10/25  1120 02/10/25  1557   POCGLU 244* 195* 188* 136     Blood Sugar Average: Last 72 hrs:  (P) 299.9    H/o DM type 2 maintained on insulin & metformin PTA who presents to ED w/ hyperglycemia, polyuria and weakness 2/2 noncompliance w/ insulin therapy.  Normal AG, mildly elevated beta hydroxybutyrate at 0.46; not consistent w/ DKA  BG >500 on admit  Increase Lantus from 20 to 24 u qhs   C/w Lispro 5 U TID & SSI AC/QHS   Hold PTA metformin  Diabetic diet  Hypoglycemia protocol  Would benefit from ambulatory referral to endocrinology at discharge vs. close PCP follow-up  CM consulted for safe dispo planning; unable to self administer medication safely

## 2025-02-10 NOTE — ASSESSMENT & PLAN NOTE
Likely secondary to dehydration, RENZO and hypoglycemia  Chest x-ray unremarkable  No focal neurodeficits  Supportive care as above
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
## 2025-02-10 NOTE — ED NOTES
Family states that Pt has been choking when eating. Patient passed dysphagia screen performed by this nurse this morning before med administration. Family states that Pt started coughing after drinking orange juice. Provider notified and speech eval ordered     Rose Marie Torres RN  02/10/25 6818

## 2025-02-10 NOTE — ASSESSMENT & PLAN NOTE
2/2 dehydration, RENZO and hyperglycemia  CXR benign   No focal neurodeficits  Supportive care as above; near baseline mentation now

## 2025-02-10 NOTE — ASSESSMENT & PLAN NOTE
Baseline creatinine around 0.8-1.0  Presents with creatinine 1.32  Suspect multifactorial etiology in the setting of poor oral intake with use of losartan  UA with 2+ protein, large blood, negative nitrites, elevated glucose  Hold losartan  Received 1 L fluid bolus in ED  Start IV Isolyte 100 mL/h  I/O  Monitor with BMP tomorrow  Avoid nephrotoxic agents and relative hypotension

## 2025-02-10 NOTE — ASSESSMENT & PLAN NOTE
Lab Results   Component Value Date    HGBA1C 9.9 (H) 02/09/2025       Recent Labs     02/09/25  1538 02/09/25  1802   POCGLU 560* 412*       Blood Sugar Average: Last 72 hrs:  (P) 486    History of type 2 diabetes mellitus maintained on insulin and metformin who presents with hyperglycemia, polyuria and weakness secondary to noncompliance with insulin therapy  Normal anion gap, mildly elevated beta hydroxybutyrate at 0.46 - low suspicion for DKA  BG >500 on admission  Start Lantus 20 units at bedtime with lispro 5 units 3 times daily with meals with correctional scale  Check A1c  Hold PTA metformin  Diabetic diet  Hypoglycemia protocol

## 2025-02-10 NOTE — ASSESSMENT & PLAN NOTE
Recent Labs     02/09/25  1554 02/10/25  0423   CREATININE 1.32* 1.13     Baseline Cr. 0.8-1.0; peak Cr./ Cr. on admit 1.32   Suspect prerenal etiology w/ poor PO intake & ARB use   UA w/ 2+ protein, large blood, negative nitrites, elevated glucose  Hold losartan & metformin   C/w IVFs   I/O  Trend BMP; avoid nephrotoxins/hypotension   negative...

## 2025-02-10 NOTE — ASSESSMENT & PLAN NOTE
Blood pressure elevated in the ED between 160-190s systolic  PTA losartan held due to RENZO  Start amlodipine 5 mg daily  As needed hydralazine

## 2025-02-10 NOTE — ASSESSMENT & PLAN NOTE
Outpatient notes reviewed  C/w PTA donepezil & Lexapro   Case management consulted for safe dispo plan; daughter is POA and notes pt cannot return home as he needs assistance with ADLs and was residing with his ex-wife

## 2025-02-10 NOTE — CASE MANAGEMENT
Case Management Assessment & Discharge Planning Note    Patient name Renard Antoine  Location [facility] MRN [id number]  : 1945 Date 2/10/2025       Current Admission Date: 2025  Current Admission Diagnosis:Type 2 diabetes mellitus with hyperglycemia, with long-term current use of insulin ([facility])   Patient Active Problem List    Diagnosis Date Noted Date Diagnosed    RENZO (acute kidney injury) ([facility]) 2025     Metabolic encephalopathy 2025     Right leg pain 07/15/2024     Right calf pain 07/15/2024     Balance disorder 07/15/2024     Primary osteoarthritis involving multiple joints 2024     Cervical spondylosis 2023     Cardiac murmur      Primary osteoarthritis of both knees 2022     Viral infection 2022     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[facility]) 2022     Neck pain 2022     Mild cognitive impairment 2022     History of atrial fibrillation 2022     Stage 2 chronic kidney disease 2021     Mixed hyperlipidemia 2020     Presbycusis of both ears 10/09/2020     Chronic low back pain 10/09/2020     Chronic kidney disease (CKD) stage G1/A2, glomerular filtration rate (GFR) equal to or greater than 90 mL/min/1.73 square meter and albuminuria creatinine ratio between  mg/g 2018     Oropharyngeal dysphagia 2018     Myofascial pain syndrome, cervical 2018     H/O aortic valve replacement 2018     NPH (normal pressure hydrocephalus) ([facility]) 2016     Essential hypertension 2016     Type 2 diabetes mellitus with hyperglycemia, with long-term current use of insulin ([facility]) 2016     Coronary artery disease 2015     Aortic prosthetic valve regurgitation 2014     Aortic stenosis 2014       LOS (days): 1  Geometric Mean LOS (GMLOS) (days):   Days to GMLOS:     OBJECTIVE:    Risk of Unplanned Readmission Score: 6.95         Current admission status: Inpatient       Preferred Pharmacy:   [facility] [place], [place] - [address]  [address]  [place] [address]  Phone: [phone] Fax: [phone]    Primary Care Provider: Armando Lozada DO    Primary Insurance: NICOLE CAZARES  Secondary Insurance:     ASSESSMENT:  Active Health Care Proxies    There are no active Health Care Proxies on file.          Readmission Root Cause  30 Day Readmission: No    Patient Information  Admitted from:: Home  Mental Status: Confused  During Assessment patient was accompanied by: Not accompanied during assessment  Assessment information provided by:: Daughter  Primary Caregiver: Self  Support Systems: Daughter  [facility] of Residence: [place]  What city do you live in?: [place]  Home entry access options. Select all that apply.: Stairs  Number of steps to enter home.: 3  Type of Current Residence: 2 story home  Upon entering residence, is there a bedroom on the main floor (no further steps)?: No  A bedroom is located on the following floor levels of residence (select all that apply):: 2nd Floor  Upon entering residence, is there a bathroom on the main floor (no further steps)?: Yes  Number of steps to 2nd floor from main floor: One Flight  Living Arrangements: Other (Comment) (Lives with ex-wife)    Activities of Daily Living Prior to Admission  Functional Status: Assistance  Completes ADLs independently?: No  Level of ADL dependence: Assistance  Ambulates independently?: Yes  Does patient use assisted devices?: No  Does patient currently own DME?: Yes  What DME does the patient currently own?: Walker, Straight Cane  Does patient have a history of Outpatient Therapy (PT/OT)?: Yes  Does the patient have a history of Short-Term Rehab?: No  Does patient have a history of HHC?: No  Does patient currently have HHC?: No         Patient Information Continued  Income Source: Pension/care home  Does patient have prescription coverage?: Yes  Does patient receive dialysis treatments?: No  Does patient have  a history of substance abuse?: No  Does patient have a history of Mental Health Diagnosis?: No         Means of Transportation  Means of Transport to Appts:: Family transport          DISCHARGE DETAILS:    Discharge planning discussed with:: Daughter- Damaris  Freedom of Choice: Yes  Comments - Freedom of Choice: States pt cannot return to previous living situation, will need LTC.  CM contacted family/caregiver?: Yes  Were Treatment Team discharge recommendations reviewed with patient/caregiver?: Yes  Did patient/caregiver verbalize understanding of patient care needs?: N/A- going to facility  Were patient/caregiver advised of the risks associated with not following Treatment Team discharge recommendations?: Yes    Contacts  Patient Contacts: yamilet Benjamin   Relationship to Patient:: Family  Contact Method: Phone  Phone Number: [phone]  Reason/Outcome: Emergency Contact, Discharge Planning        Additional Comments: CM completed Open assessment with pt's daughter, Damaris, via phone.  Damaris states that pt currently resides with his ex-wife in a 2 pritesh home, 3 [address].  Per Damaris, pt is not able to return and will need placement in a facility.  Damaris states that pt was only temporarily staying with his ex- wife.  Pt is incontinent and requires assistance with ADLs.  Pt has a can and a walker but does not use them.  Pt has not SA history and no BH diagnosis.  Pt has a SS income of $1,382 and a pension of $189 per month.  Damaris states she is pt's POA.  CM asked Damaris to bring in a copy of her paperwork when she comes back to the hospital. CM asked if there were any alternative housing arrangements for pt, Damaris states there are not and returning home is not an option.

## 2025-02-10 NOTE — UTILIZATION REVIEW
Initial Clinical Review    Admission: Date/Time/Statement:   Admission Orders (From admission, onward)       Ordered        02/09/25 1813  INPATIENT ADMISSION  Once                          Orders Placed This Encounter   Procedures    INPATIENT ADMISSION     Standing Status:   Standing     Number of Occurrences:   1     Level of Care:   Med Surg [16]     Estimated length of stay:   More than 2 Midnights     Certification:   I certify that inpatient services are medically necessary for this patient for a duration of greater than two midnights. See H&P and MD Progress Notes for additional information about the patient's course of treatment.     ED Arrival Information       Expected   -    Arrival   2/9/2025 15:34    Acuity   Emergent              Means of arrival   Ambulance    Escorted by   [facility] ([place])    Service   Hospitalist    Admission type   Emergency              Arrival complaint   Gen weakness             Chief Complaint   Patient presents with    Altered Mental Status     Per family has not been himself at home over the past 4 days, elevated sugars has not been eating or drinking at home       Initial Presentation: 79 y.o. male with PMHx includes T2DM, CKD, HTN, cognitive impairment, NPH sp shunt, MDD who presented on 2/9/25 to ED due to altered mental status, poor historian. Presents from home due to decreased PO intake and noncompliance with insulin per family, worse over last few days.  In the ED, hypertensive at 161/85.  Labs revealed glucose 560, BUN 30, Cr 1.32, alk phos 151, total protein 8.9. UA positive for glucose, blood, protein, leuks, wbc, rbc. Given 1L IVF bolus followed by infusion, SQ insulin.     Plan:  Admit Inpatient status Dx Hyperglycemia, RENZO:   med surg, Start accuchecks w/ SSI, order A1c, hold metformin, order diabetic diet. Continue IVF and monitor BMP, renal function. Monitor BP and start amlodipine, hydralazine prn.     Anticipated Length of Stay/Certification Statement:  Patient will be admitted on an inpatient basis with an anticipated length of stay of greater than 2 midnights secondary to RENZO, hyperglycemia.     Date: 2/10   Day 2:  Assessment unchanged today. CM following for safe discharge planning due to patient unable to administer medications safely. Continue to monitor accuchecks, VS, labs. Continue IVF, fu on PT OT eval.     Date: 2/11  Day 3: Has surpassed a 2nd midnight with active treatments and services. No new concerns or complaints today. CM working on rehab placement. Continue above tx plan including PT OT.     ED Treatment-Medication Administration from 02/09/2025 1534 to 02/10/2025 0925         Date/Time Order Dose Route Action     02/09/2025 1614 multi-electrolyte (ISOLYTE-S PH 7.4) bolus 1,000 mL 1,000 mL Intravenous New Bag     02/09/2025 1720 ondansetron (ZOFRAN) injection 4 mg 4 mg Intravenous Given     02/09/2025 2209 atorvastatin (LIPITOR) tablet 20 mg 20 mg Oral Given     02/09/2025 2209 donepezil (ARICEPT) tablet 5 mg 5 mg Oral Given     02/09/2025 1940 multi-electrolyte (PLASMALYTE-A/ISOLYTE-S PH 7.4) IV solution 100 mL/hr Intravenous New Bag     02/09/2025 2208 heparin (porcine) subcutaneous injection 5,000 Units 5,000 Units Subcutaneous Given     02/09/2025 1940 insulin glargine (LANTUS) subcutaneous injection 20 Units 0.2 mL 20 Units Subcutaneous Given     02/10/2025 0757 insulin lispro (HumALOG/ADMELOG) 100 units/mL subcutaneous injection 1-5 Units 2 Units Subcutaneous Given     02/09/2025 2154 insulin lispro (HumALOG/ADMELOG) 100 units/mL subcutaneous injection 1-5 Units 4 Units Subcutaneous Given     02/10/2025 0758 insulin lispro (HumALOG/ADMELOG) 100 units/mL subcutaneous injection 5 Units 5 Units Subcutaneous Given     02/09/2025 1935 amLODIPine (NORVASC) tablet 5 mg 5 mg Oral Given            Scheduled Medications:  amLODIPine, 5 mg, Oral, Daily  atorvastatin, 20 mg, Oral, HS  donepezil, 5 mg, Oral, HS  escitalopram, 5 mg, Oral, Daily  heparin  (porcine), 5,000 Units, Subcutaneous, Q8H MAURO  insulin glargine, 18 Units, Subcutaneous, HS  insulin lispro, 1-5 Units, Subcutaneous, TID AC  insulin lispro, 1-5 Units, Subcutaneous, HS  insulin lispro, 5 Units, Subcutaneous, TID With Meals      Continuous IV Infusions:  multi-electrolyte, 125 mL/hr, Intravenous, Continuous      PRN Meds:  hydrALAZINE, 5 mg, Intravenous, Q6H PRN      ED Triage Vitals   Temperature Pulse Respirations Blood Pressure SpO2 Pain Score   02/09/25 1718 02/09/25 1542 02/09/25 1542 02/09/25 1542 02/09/25 1542 02/09/25 1900   98.3 °F (36.8 °C) 78 18 161/85 95 % No Pain     Weight (last 2 days)       Date/Time Weight    02/11/25 0600 60.2 (132.8)     Weight: RN notified. at 02/11/25 0600    02/10/25 17:02:14 70.1 (154.6)            Vital Signs (last 3 days)       Date/Time Temp Pulse Resp BP MAP (mmHg) SpO2 O2 Device Patient Position - Orthostatic VS [place] Coma Scale Score Pain    02/11/25 0935 -- -- -- -- -- -- -- -- -- 2    02/11/25 0931 -- -- -- -- -- -- -- -- -- 2    02/11/25 0830 -- -- -- -- -- -- None (Room air) -- 14 No Pain    02/11/25 07:36:48 98.3 °F (36.8 °C) 74 -- 138/75 96 94 % -- -- -- --    02/11/25 07:32:54 98.3 °F (36.8 °C) 69 -- 138/75 96 97 % -- -- -- --    02/11/25 0300 -- -- -- -- -- -- None (Room air) -- 14 --    02/11/25 0100 -- -- -- -- -- -- -- -- -- No Pain    02/10/25 17:02:14 98.1 °F (36.7 °C) 67 16 141/75 97 95 % -- -- 14 No Pain    02/10/25 1500 -- 67 14 116/68 87 94 % -- -- -- --    02/10/25 1330 -- 70 12 138/78 103 93 % None (Room air) Lying -- No Pain    02/10/25 1300 -- 71 15 151/77 108 95 % None (Room air) -- -- --    02/10/25 1200 -- 71 17 132/65 92 94 % None (Room air) Lying -- No Pain    02/10/25 1115 -- -- -- 128/70 -- -- -- -- -- --    02/10/25 0730 -- -- -- -- -- -- -- -- -- No Pain    02/10/25 0600 -- 71 21 166/85 115 92 % None (Room air) Sitting -- --    02/10/25 0500 -- 69 14 154/81 111 94 % None (Room air) Lying -- --    02/10/25 0400 -- 71 12  118/66 87 94 % None (Room air) Lying -- --    02/10/25 0300 -- 70 15 128/68 93 93 % None (Room air) Lying -- --    02/10/25 0200 -- 75 12 120/68 89 91 % None (Room air) Lying -- --    02/10/25 0100 -- 76 17 102/63 78 92 % None (Room air) Lying -- --    02/10/25 0000 -- 76 13 111/63 81 92 % None (Room air) Lying -- --    02/09/25 2300 -- 75 13 126/70 92 92 % None (Room air) Lying -- --    02/09/25 2200 -- 70 14 164/87 119 93 % None (Room air) Lying -- --    02/09/25 2100 -- 73 12 145/79 106 93 % None (Room air) Lying -- --    02/09/25 2000 -- 77 20 147/78 106 92 % None (Room air) Lying -- --    02/09/25 1935 -- -- -- 152/84 -- -- -- -- -- --    02/09/25 1900 -- 74 24 159/90 118 93 % None (Room air) Lying -- No Pain    02/09/25 1830 -- 67 34 196/91 130 95 % None (Room air) Sitting -- --    02/09/25 1730 -- 61 26 182/86 124 97 % None (Room air) Sitting -- --    02/09/25 1718 98.3 °F (36.8 °C) -- -- -- -- -- -- -- -- --    02/09/25 1659 -- -- -- -- -- -- -- -- 14 --    02/09/25 1630 -- 62 18 169/79 113 100 % -- -- -- --    02/09/25 1542 -- 78 18 161/85 -- 95 % None (Room air) Lying -- --              Pertinent Labs/Diagnostic Test Results:   Radiology:  XR chest 2 views   ED Interpretation by Chucky Ennis MD (02/09 1845)   No acute cardiopulmonary disease.      Final Interpretation by Sarah Timmons MD (02/10 0642)      No acute cardiopulmonary disease.            Workstation performed: [id number]           Cardiology:  ECG 12 lead   Final Result by Donna Kaiser MD (02/10 5269)   Normal sinus rhythm   Possible Left atrial enlargement   Left axis deviation   Incomplete right bundle branch block   Abnormal ECG   When compared with ECG of 29-Jul-2024 11:18,   No significant change was found   Confirmed by Donna Kaiser ([id number]) on 2/10/2025 10:49:29 PM        GI:  No orders to display           Results from last 7 days   Lab Units 02/10/25  0423 02/09/25  1554   WBC Thousand/uL 7.87 7.25   HEMOGLOBIN  g/dL 14.5 15.0   HEMATOCRIT % 46.2 48.1   PLATELETS Thousands/uL 207 209   TOTAL NEUT ABS Thousands/µL 5.25 4.96         Results from last 7 days   Lab Units 02/10/25  0423 02/09/25  1554   SODIUM mmol/L 143 138   POTASSIUM mmol/L 3.6 4.5   CHLORIDE mmol/L 102 99   CO2 mmol/L 34* 28   ANION GAP mmol/L 7 11   BUN mg/dL 23 30*   CREATININE mg/dL 1.13 1.32*   EGFR ml/min/1.73sq m 61 50   CALCIUM mg/dL 10.2 10.4*     Results from last 7 days   Lab Units 02/09/25  1554   AST U/L 18   ALT U/L 18   ALK PHOS U/L 151*   TOTAL PROTEIN g/dL 8.9*   ALBUMIN g/dL 4.0   TOTAL BILIRUBIN mg/dL 0.64     Results from last 7 days   Lab Units 02/11/25  1220 02/11/25  1101 02/11/25  0637 02/11/25  0607 02/10/25  2108 02/10/25  1557 02/10/25  1120 02/10/25  1108 02/10/25  0755 02/10/25  0408 02/10/25  0049 02/09/25  2152   POC GLUCOSE mg/dl 122 175* 78 62* 156* 136 188* 195* 244* 208* 217* 375*     Results from last 7 days   Lab Units 02/10/25  0423 02/09/25  1554   GLUCOSE RANDOM mg/dL 220* 552*         Results from last 7 days   Lab Units 02/09/25  1554   HEMOGLOBIN A1C % 9.9*   EAG mg/dl 237     Beta- Hydroxybutyrate   Date Value Ref Range Status   02/09/2025 0.46 (H) 0.02 - 0.27 mmol/L Final          Results from last 7 days   Lab Units 02/09/25  1554   PH LIAN  7.306   PCO2 LIAN mm Hg 57.9*   PO2 LIAN mm Hg 14.9*   HCO3 LIAN mmol/L 28.2   BASE EXC LIAN mmol/L 0.6   O2 CONTENT LIAN ml/dL 3.3   O2 HGB, VENOUS % 15.8*       Results from last 7 days   Lab Units 02/09/25  1731   CLARITY UA  Clear   COLOR UA  Light Yellow   SPEC GRAV UA  1.023   PH UA  6.5   GLUCOSE UA mg/dl >=1000 (1%)*   KETONES UA mg/dl Negative   BLOOD UA  Large*   PROTEIN UA mg/dl 100 (2+)*   NITRITE UA  Negative   BILIRUBIN UA  Negative   UROBILINOGEN UA (BE) mg/dl <2.0   LEUKOCYTES UA  Elevated glucose may cause decreased leukocyte values. See urine microscopic for UWBC result*   WBC UA /hpf 2-4*   RBC UA /hpf Innumerable*   BACTERIA UA /hpf None Seen   EPITHELIAL CELLS  WET PREP /hpf None Seen     Past Medical History:   Diagnosis Date    Anemia     Arthritis     CAD (coronary artery disease)     Cardiac murmur     Depression     Resolved:11/25/16    Diabetes mellitus (HCC)     per Allscripts: type 2    GERD (gastroesophageal reflux disease)     History of blood transfusion     2014, after firdt heart valve surgery in [facility]    Hydrocephalus (HCC)     Hypertension     Impacted cerumen of right ear 4/11/2022    PAF (paroxysmal atrial fibrillation) (HCC)      Present on Admission:   Essential hypertension   Mild cognitive impairment   NPH (normal pressure hydrocephalus) (HCC)   Oropharyngeal dysphagia      Admitting Diagnosis: Weakness [R53.1]  Hyperglycemia [R73.9]  RENZO (acute kidney injury) ([facility]) [N17.9]  Age/Sex: 79 y.o. male    Network Utilization Review Department  ATTENTION: Please call with any questions or concerns to [phone] and carefully listen to the prompts so that you are directed to the right person. All voicemails are confidential.   For Discharge needs, contact Care Management DC Support Team at [phone] opt. 2  Send all requests for admission clinical reviews, approved or denied determinations and any other requests to dedicated fax number below belonging to the campus where the patient is receiving treatment. List of dedicated fax numbers for the Facilities:  FACILITY NAME UR FAX NUMBER   ADMISSION DENIALS (Administrative/Medical Necessity) [phone]   DISCHARGE SUPPORT TEAM (NETWORK) [phone]   PARENT CHILD HEALTH (Maternity/NICU/Pediatrics) [phone]   [facility] [phone]   [facility] [phone]   [facility] [phone]   [facility] [phone]   Critical access hospital [phone]   [facility] [phone]   [facility] [phone]   [facility]  [facility] [phone]   [facility] [phone]   [facility] [phone]   [facility] [phone]   [facility] [phone]

## 2025-02-11 PROBLEM — R35.0 URINARY FREQUENCY: Status: ACTIVE | Noted: 2025-02-11

## 2025-02-11 LAB
GLUCOSE SERPL-MCNC: 122 MG/DL (ref 65–140)
GLUCOSE SERPL-MCNC: 174 MG/DL (ref 65–140)
GLUCOSE SERPL-MCNC: 175 MG/DL (ref 65–140)
GLUCOSE SERPL-MCNC: 285 MG/DL (ref 65–140)
GLUCOSE SERPL-MCNC: 54 MG/DL (ref 65–140)
GLUCOSE SERPL-MCNC: 62 MG/DL (ref 65–140)
GLUCOSE SERPL-MCNC: 78 MG/DL (ref 65–140)
GLUCOSE SERPL-MCNC: 81 MG/DL (ref 65–140)

## 2025-02-11 PROCEDURE — 97167 OT EVAL HIGH COMPLEX 60 MIN: CPT

## 2025-02-11 PROCEDURE — 82948 REAGENT STRIP/BLOOD GLUCOSE: CPT

## 2025-02-11 PROCEDURE — 99232 SBSQ HOSP IP/OBS MODERATE 35: CPT | Performed by: INTERNAL MEDICINE

## 2025-02-11 PROCEDURE — 92526 ORAL FUNCTION THERAPY: CPT

## 2025-02-11 PROCEDURE — 97163 PT EVAL HIGH COMPLEX 45 MIN: CPT

## 2025-02-11 RX ORDER — INSULIN GLARGINE 100 [IU]/ML
18 INJECTION, SOLUTION SUBCUTANEOUS
Status: DISCONTINUED | OUTPATIENT
Start: 2025-02-11 | End: 2025-02-12 | Stop reason: HOSPADM

## 2025-02-11 RX ORDER — SENNOSIDES 8.6 MG
2 TABLET ORAL ONCE
Status: COMPLETED | OUTPATIENT
Start: 2025-02-11 | End: 2025-02-11

## 2025-02-11 RX ADMIN — HEPARIN SODIUM 5000 UNITS: 5000 INJECTION INTRAVENOUS; SUBCUTANEOUS at 21:35

## 2025-02-11 RX ADMIN — AMLODIPINE BESYLATE 5 MG: 5 TABLET ORAL at 08:23

## 2025-02-11 RX ADMIN — INSULIN LISPRO 5 UNITS: 100 INJECTION, SOLUTION INTRAVENOUS; SUBCUTANEOUS at 13:28

## 2025-02-11 RX ADMIN — INSULIN GLARGINE 18 UNITS: 100 INJECTION, SOLUTION SUBCUTANEOUS at 21:37

## 2025-02-11 RX ADMIN — DONEPEZIL HYDROCHLORIDE 5 MG: 5 TABLET ORAL at 21:35

## 2025-02-11 RX ADMIN — ESCITALOPRAM OXALATE 5 MG: 5 TABLET, FILM COATED ORAL at 08:23

## 2025-02-11 RX ADMIN — HEPARIN SODIUM 5000 UNITS: 5000 INJECTION INTRAVENOUS; SUBCUTANEOUS at 13:28

## 2025-02-11 RX ADMIN — INSULIN LISPRO 2 UNITS: 100 INJECTION, SOLUTION INTRAVENOUS; SUBCUTANEOUS at 21:35

## 2025-02-11 RX ADMIN — STANDARDIZED SENNA CONCENTRATE 17.2 MG: 8.6 TABLET ORAL at 11:56

## 2025-02-11 RX ADMIN — INSULIN LISPRO 1 UNITS: 100 INJECTION, SOLUTION INTRAVENOUS; SUBCUTANEOUS at 11:56

## 2025-02-11 RX ADMIN — ATORVASTATIN CALCIUM 20 MG: 20 TABLET, FILM COATED ORAL at 21:35

## 2025-02-11 RX ADMIN — HEPARIN SODIUM 5000 UNITS: 5000 INJECTION INTRAVENOUS; SUBCUTANEOUS at 06:07

## 2025-02-11 RX ADMIN — INSULIN LISPRO 5 UNITS: 100 INJECTION, SOLUTION INTRAVENOUS; SUBCUTANEOUS at 08:23
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
## 2025-02-11 NOTE — ASSESSMENT & PLAN NOTE
Outpatient notes reviewed  C/w PTA donepezil & Lexapro   Case management consulted for safe dispo plan; daughter is POA and notes pt cannot return home as he needs assistance with ADLs and was residing with his ex-wife  Discharge planning to rehab and then LTC

## 2025-02-11 NOTE — ASSESSMENT & PLAN NOTE
Lab Results   Component Value Date    HGBA1C 9.9 (H) 02/09/2025     Recent Labs     02/11/25  0607 02/11/25  0637 02/11/25  1101 02/11/25  1220   POCGLU 62* 78 175* 122     Blood Sugar Average: Last 72 hrs:  (P) [id number]    H/o DM type 2 maintained on insulin & metformin PTA who presents to ED w/ hyperglycemia, polyuria and weakness 2/2 noncompliance w/ insulin therapy.  Normal AG, mildly elevated beta hydroxybutyrate at 0.46; not consistent w/ DKA  BG >500 on admit  Increase Lantus from 20 to 24 u qhs   C/w Lispro 5 U TID & SSI AC/QHS   Hold PTA metformin  Diabetic diet  Hypoglycemia protocol  Would benefit from ambulatory referral to endocrinology at discharge vs. close PCP follow-up  CM consulted for safe dispo planning; unable to self administer medication safely

## 2025-02-11 NOTE — ASSESSMENT & PLAN NOTE
Recent Labs     02/09/25  1554 02/10/25  0423   CREATININE 1.32* 1.13     Baseline Cr. 0.8-1.0; peak Cr./ Cr. on admit 1.32   Suspect prerenal etiology w/ poor PO intake & ARB use   UA w/ 2+ protein, large blood, negative nitrites, elevated glucose  Hold losartan & metformin   C/w IVFs   I/O  Trend BMP; avoid nephrotoxins/hypotension

## 2025-02-11 NOTE — PHYSICAL THERAPY NOTE
"Pt is 79 y.o. male seen for PT evaluation s/p admit to [facility] on 2/9/2025  with hyperglycemia, polyuria and weakness secondary to noncompliance with insulin therapy.   Dx including DMII w/ hyperglycemia; RENZO; metabolic encephalopathy; HTN. PT consulted for assist w/ mobility and d/c planning. Pt   has a past medical history of Anemia, Arthritis, CAD (coronary artery disease), Cardiac murmur, Depression, Diabetes mellitus (HCC), GERD (gastroesophageal reflux disease), History of blood transfusion, Hydrocephalus (HCC), Hypertension, Impacted cerumen of right ear (4/11/2022), and PAF (paroxysmal atrial fibrillation) ([facility]).  Daughter is present and assisting w/ providing all info as pt is somewhat confused. Pt was living in a 2SH 3 BUZZ w/ his ex spouse (who also has health issues) . Per daughter pt is incontinent and spouse was having to assist him but cannot continue to do so. Pt has (+) falls 4-5 and has been \"declining\". Pt has a RW and SPC but is reluctant to use.  Currently,  pt  is requiring Jamshid A for bed skills; Jamshid for functional transfers and min> modA for ambulation w/ shuffling gait/ unsteady (+) LOB w/ HHA 5-6' bed> chair.   Pt presents functioning below baseline and currently w/ overall mobility deficits 2* to: decreased LE strength/AROM; limited flexibility;  generalized weakness/ deconditioning; decreased endurance; decreased activity tolerance; decreased coordination; impaired balance; gait deviations; decreased safety awareness; SOB/CEVALLOS; fatigue; impaired safety and judgement; limited insight into current deficits; bed/ chair alarms; multiple lines; hearing impairment/ visual impairments;   ***.   : {klhbodysystem:[id number]}.  Pt currently at risk for falls.  (Please find additional objective findings from PT assessment regarding body systems outlined above.) Pt will continue to benefit from skilled PT interventions to address stated impairments; to maximize functional potential; for ongoing " pt/ family training; and DME needs.  PT is recommending PT Resource Intensity Level  *** on d/c.      PT will follow for STG as outlined on eval. Pt/ family agreeable to PT POC and goals as stated.

## 2025-02-11 NOTE — PROGRESS NOTES
Progress Note - Hospitalist   Name: Renard Antoine 79 y.o. male I MRN: [id number]  Unit/Bed#: -01 I Date of Admission: 2/9/2025   Date of Service: 2/11/2025 I Hospital Day: 2    Assessment & Plan  Type 2 diabetes mellitus with hyperglycemia, with long-term current use of insulin ([facility])  Lab Results   Component Value Date    HGBA1C 9.9 (H) 02/09/2025     Recent Labs     02/11/25  0607 02/11/25  0637 02/11/25  1101 02/11/25  1220   POCGLU 62* 78 175* 122     Blood Sugar Average: Last 72 hrs:  (P) [id number]    H/o DM type 2 maintained on insulin & metformin PTA who presents to ED w/ hyperglycemia, polyuria and weakness 2/2 noncompliance w/ insulin therapy.  Normal AG, mildly elevated beta hydroxybutyrate at 0.46; not consistent w/ DKA  BG >500 on admit  Increase Lantus from 20 to 24 u qhs   C/w Lispro 5 U TID & SSI AC/QHS   Hold PTA metformin  Diabetic diet  Hypoglycemia protocol  Would benefit from ambulatory referral to endocrinology at discharge vs. close PCP follow-up  CM consulted for safe dispo planning; unable to self administer medication safely  RENZO (acute kidney injury) ([facility])  Recent Labs     02/09/25  1554 02/10/25  0423   CREATININE 1.32* 1.13     Baseline Cr. 0.8-1.0; peak Cr./ Cr. on admit 1.32   Suspect prerenal etiology w/ poor PO intake & ARB use   UA w/ 2+ protein, large blood, negative nitrites, elevated glucose  Hold losartan & metformin   C/w IVFs   I/O  Trend BMP; avoid nephrotoxins/hypotension  Metabolic encephalopathy  2/2 dehydration, RENZO and hyperglycemia  CXR benign   No focal neurodeficits  Supportive care as above; near baseline mentation now   Mild cognitive impairment  Outpatient notes reviewed  C/w PTA donepezil & Lexapro   Case management consulted for safe dispo plan; daughter is POA and notes pt cannot return home as he needs assistance with ADLs and was residing with his ex-wife  Discharge planning to rehab and then LTC  Essential hypertension  SBP variable:  116-166   PTA losartan held due to RENZO  C/w recently added amlodipine 5 mg q day   PRN hydralazine  History of atrial fibrillation  Currently in NSR  Not maintained on AV yi agents or AC  Monitor  Oropharyngeal dysphagia  H/o   ST evaluated   C/w puree with nectar thick liquids   NPH (normal pressure hydrocephalus) (HCC)  H/o per chart review  S/p shunt; outpt routine neurosurgery f/u   Urinary frequency  Pt daughter reports that he has had trouble with incontinence for many years but never had it evaluated.  Bladder scan this AM with a PVR of 180ml  Outpatient referral to urology for further evaluation    VTE Pharmacologic Prophylaxis:   Moderate Risk (Score 3-4) - Pharmacological DVT Prophylaxis Ordered: heparin.    Mobility:   Basic Mobility Inpatient Raw Score: 12  JH-HLM Goal: 4: Move to chair/commode  JH-HLM Achieved: 5: Stand (1 or more minutes)  JH-HLM Goal NOT achieved. Continue with multidisciplinary rounding and encourage appropriate mobility to improve upon JH-HLM goals.    Patient Centered Rounds: I performed bedside rounds with nursing staff today.   Discussions with Specialists or Other Care Team Provider: nurse, CM    Education and Discussions with Family / Patient: Updated  (daughter) at bedside.    Current Length of Stay: 2 day(s)  Current Patient Status: Inpatient   Certification Statement: The patient will continue to require additional inpatient hospital stay due to discharge planning  Discharge Plan: Anticipate discharge tomorrow to rehab facility.    Code Status: Level 3 - DNAR and DNI    Subjective   Denies any new complaints.    Objective :  Temp:  [98.1 °F (36.7 °C)-98.3 °F (36.8 °C)] 98.3 °F (36.8 °C)  HR:  [67-74] 74  BP: (116-141)/(68-75) 138/75  Resp:  [14-16] 16  SpO2:  [94 %-97 %] 94 %  O2 Device: None (Room air)    Body mass index is 21.43 kg/m².     Input and Output Summary (last 24 hours):   No intake or output data in the 24 hours ending 02/11/25 7957    Physical  Exam  Constitutional:       Appearance: Normal appearance.   HENT:      Head: Normocephalic and atraumatic.      Nose: Nose normal.   Eyes:      Extraocular Movements: Extraocular movements intact.   Cardiovascular:      Rate and Rhythm: Normal rate and regular rhythm.   Pulmonary:      Effort: Pulmonary effort is normal.      Breath sounds: No wheezing or rales.   Abdominal:      General: There is no distension.      Palpations: Abdomen is soft.      Tenderness: There is no abdominal tenderness.   Musculoskeletal:      Right lower leg: No edema.      Left lower leg: No edema.   Skin:     General: Skin is dry.   Neurological:      Mental Status: He is alert. Mental status is at baseline.   Psychiatric:         Mood and Affect: Mood normal.         Behavior: Behavior normal.           Lines/Drains:              Lab Results: I have reviewed the following results:   Results from last 7 days   Lab Units 02/10/25  0423   WBC Thousand/uL 7.87   HEMOGLOBIN g/dL 14.5   HEMATOCRIT % 46.2   PLATELETS Thousands/uL 207   SEGS PCT % 67   LYMPHO PCT % 19   MONO PCT % 11   EOS PCT % 2     Results from last 7 days   Lab Units 02/10/25  0423 02/09/25  1554   SODIUM mmol/L 143 138   POTASSIUM mmol/L 3.6 4.5   CHLORIDE mmol/L 102 99   CO2 mmol/L 34* 28   BUN mg/dL 23 30*   CREATININE mg/dL 1.13 1.32*   ANION GAP mmol/L 7 11   CALCIUM mg/dL 10.2 10.4*   ALBUMIN g/dL  --  4.0   TOTAL BILIRUBIN mg/dL  --  0.64   ALK PHOS U/L  --  151*   ALT U/L  --  18   AST U/L  --  18   GLUCOSE RANDOM mg/dL 220* 552*         Results from last 7 days   Lab Units 02/11/25  1220 02/11/25  1101 02/11/25  0637 02/11/25  0607 02/10/25  2108 02/10/25  1557 02/10/25  1120 02/10/25  1108 02/10/25  0755 02/10/25  0408 02/10/25  0049 02/09/25  2152   POC GLUCOSE mg/dl 122 175* 78 62* 156* 136 188* 195* 244* 208* 217* 375*     Results from last 7 days   Lab Units 02/09/25  1554   HEMOGLOBIN A1C % 9.9*           Recent Cultures (last 7 days):         Imaging  Results Review: No pertinent imaging studies reviewed.  Other Study Results Review: No additional pertinent studies reviewed.    Last 24 Hours Medication List:     Current Facility-Administered Medications:     amLODIPine (NORVASC) tablet 5 mg, Daily    atorvastatin (LIPITOR) tablet 20 mg, HS    donepezil (ARICEPT) tablet 5 mg, HS    escitalopram (LEXAPRO) tablet 5 mg, Daily    heparin (porcine) subcutaneous injection 5,000 Units, Q8H MAURO    hydrALAZINE (APRESOLINE) injection 5 mg, Q6H PRN    insulin glargine (LANTUS) subcutaneous injection 18 Units 0.18 mL, HS    insulin lispro (HumALOG/ADMELOG) 100 units/mL subcutaneous injection 1-5 Units, TID AC **AND** Fingerstick Glucose (POCT), TID AC    insulin lispro (HumALOG/ADMELOG) 100 units/mL subcutaneous injection 1-5 Units, HS    insulin lispro (HumALOG/ADMELOG) 100 units/mL subcutaneous injection 5 Units, TID With Meals    multi-electrolyte (PLASMALYTE-A/ISOLYTE-S PH 7.4) IV solution, Continuous, Last Rate: 125 mL/hr (02/11/25 1156)    Administrative Statements   Today, Patient Was Seen By: Charly Joyce MD  I have spent a total time of 40 minutes in caring for this patient on the day of the visit/encounter including Diagnostic results, Instructions for management, Patient and family education, Impressions, Counseling / Coordination of care, Documenting in the medical record, Reviewing / ordering tests, medicine, procedures  , Obtaining or reviewing history  , and Communicating with other healthcare professionals .    **Please Note: This note may have been constructed using a voice recognition system.**

## 2025-02-11 NOTE — ASSESSMENT & PLAN NOTE
Pt daughter reports that he has had trouble with incontinence for many years but never had it evaluated.  Bladder scan this AM with a PVR of 180ml  Outpatient referral to urology for further evaluation

## 2025-02-11 NOTE — PLAN OF CARE
Problem: OCCUPATIONAL THERAPY ADULT  Goal: Performs self-care activities at highest level of function for planned discharge setting.  See evaluation for individualized goals.  Description: Treatment Interventions: ADL retraining, Visual perceptual retraining, Functional transfer training, UE strengthening/ROM, Patient/family training, Endurance training, Cognitive reorientation, Equipment evaluation/education, Compensatory technique education, Fine motor coordination activities, Neuromuscular reeducation, Continued evaluation, Energy conservation, Activityengagement          See flowsheet documentation for full assessment, interventions and recommendations.   Note: Limitation: Decreased ADL status, Decreased Safe judgement during ADL, Decreased cognition, Decreased endurance, Decreased self-care trans, Decreased high-level ADLs, Decreased fine motor control  Prognosis: Fair  Assessment: Pt is a 79 y.o. male who was admitted to [facility] on 2/9/2025 with change in mental status, weakness, RENZO, poor oral intake and nowhere with  Type 2 diabetes mellitus with hyperglycemia, with long-term current use of insulin ([facility]) .hyperglycemia, non-compliant with insulin. Metabolic encephalopathy, mild cog impairment at baseline Patient . has a past medical history of Anemia, Arthritis, CAD (coronary artery disease), Cardiac murmur, Depression, Diabetes mellitus ([facility]), GERD (gastroesophageal reflux disease), History of blood transfusion, Hydrocephalus ([facility]), Hypertension, Impacted cerumen of right ear (4/11/2022), and PAF (paroxysmal atrial fibrillation) ([facility]).  At baseline pt was completing assistance with ADL's Pt lives with ex-wife in a 2SH with 3STE and FFOS to access bathroom . Currently pt requires min/mod a for overall ADLS and min/mod a with HHA for functional mobility/transfers. Pt currently presents with impairments in the following categories -steps to enter environment, difficulty performing ADLS, difficulty performing  IADLS , limited insight into deficits, compliance, flat affect, and decreased initiation and engagement  activity tolerance, endurance, standing balance/tolerance, sitting balance/tolerance, FMC, memory, insight, safety , judgement , attention , and sequencing . These impairments, as well as pt's fatigue, pain, decreased caregiver support, and risk for falls  limit pt's ability to safely engage in all baseline areas of occupation, includingeating, grooming, bathing, dressing, toileting, functional mobility/transfers, community mobility, and medication management From OT standpoint, recommend mod level II upon D/C.   The patient's raw score on the -PAC Daily Activity Inpatient Short Form is 14. A raw score of less than 19 suggests the patient may benefit from discharge to post-acute rehabilitation services. Please refer to the recommendation of the Occupational Therapist for safe discharge planning.  OT will continue to follow to address the below stated goals.     Rehab Resource Intensity Level, OT: II (Moderate Resource Intensity)

## 2025-02-11 NOTE — CASE MANAGEMENT
Case Management Discharge Planning Note    Patient name Renard Antoine  Location /-01 MRN [id number]  : 1945 Date 2025       Current Admission Date: 2025  Current Admission Diagnosis:Type 2 diabetes mellitus with hyperglycemia, with long-term current use of insulin ([facility])   Patient Active Problem List    Diagnosis Date Noted Date Diagnosed    RENZO (acute kidney injury) ([facility]) 2025     Metabolic encephalopathy 2025     Right leg pain 07/15/2024     Right calf pain 07/15/2024     Balance disorder 07/15/2024     Primary osteoarthritis involving multiple joints 2024     Cervical spondylosis 2023     Cardiac murmur      Primary osteoarthritis of both knees 2022     Viral infection 2022     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[facility]) 2022     Neck pain 2022     Mild cognitive impairment 2022     History of atrial fibrillation 2022     Stage 2 chronic kidney disease 2021     Mixed hyperlipidemia 2020     Presbycusis of both ears 10/09/2020     Chronic low back pain 10/09/2020     Chronic kidney disease (CKD) stage G1/A2, glomerular filtration rate (GFR) equal to or greater than 90 mL/min/1.73 square meter and albuminuria creatinine ratio between  mg/g 2018     Oropharyngeal dysphagia 2018     Myofascial pain syndrome, cervical 2018     H/O aortic valve replacement 2018     NPH (normal pressure hydrocephalus) ([facility]) 2016     Essential hypertension 2016     Type 2 diabetes mellitus with hyperglycemia, with long-term current use of insulin ([facility]) 2016     Coronary artery disease 2015     Aortic prosthetic valve regurgitation 2014     Aortic stenosis 2014       LOS (days): 2  Geometric Mean LOS (GMLOS) (days): 4  Days to GMLOS:2.2     OBJECTIVE:  Risk of Unplanned Readmission Score: 6.98         Current admission status: Inpatient   Preferred Pharmacy:   [facility] - [place], [place] - [address]  [address]  [place] [address]  Phone: [phone] Fax: [phone]    Primary Care Provider: Armando Lozada DO    Primary Insurance: [facility] REP  Secondary Insurance:     DISCHARGE DETAILS:    Discharge planning discussed with:: Damaris Rojas  Freedom of Choice: Yes  Comments - Freedom of Choice: Discussed FOC  CM contacted family/caregiver?: Yes  Were Treatment Team discharge recommendations reviewed with patient/caregiver?: Yes  Did patient/caregiver verbalize understanding of patient care needs?: N/A- going to facility  Were patient/caregiver advised of the risks associated with not following Treatment Team discharge recommendations?: Yes    Other Referral/Resources/Interventions Provided:  Interventions: SNF  Referral Comments: Daughter states she is legal POA for medical and financial.  This CM asked that daughter provide copy of POA to [facility].  Daughter states patient should not return to previous living situation (was living with ex-wife, has multiple steps in home).  [place] referral to SNF in [facility].  Daughter states [place], [place] [address], referrals to SNF placed within radius of that zip code as well.

## 2025-02-12 VITALS
RESPIRATION RATE: 18 BRPM | WEIGHT: 132.8 LBS | SYSTOLIC BLOOD PRESSURE: 127 MMHG | BODY MASS INDEX: 21.34 KG/M2 | TEMPERATURE: 98.7 F | HEIGHT: 66 IN | HEART RATE: 76 BPM | OXYGEN SATURATION: 95 % | DIASTOLIC BLOOD PRESSURE: 71 MMHG

## 2025-02-12 LAB
ANION GAP SERPL CALCULATED.3IONS-SCNC: 6 MMOL/L (ref 4–13)
BUN SERPL-MCNC: 16 MG/DL (ref 5–25)
CALCIUM SERPL-MCNC: 9 MG/DL (ref 8.4–10.2)
CHLORIDE SERPL-SCNC: 104 MMOL/L (ref 96–108)
CO2 SERPL-SCNC: 29 MMOL/L (ref 21–32)
CREAT SERPL-MCNC: 0.95 MG/DL (ref 0.6–1.3)
ERYTHROCYTE [DISTWIDTH] IN BLOOD BY AUTOMATED COUNT: 13.6 % (ref 11.6–15.1)
GFR SERPL CREATININE-BSD FRML MDRD: 75 ML/MIN/1.73SQ M
GLUCOSE SERPL-MCNC: 111 MG/DL (ref 65–140)
GLUCOSE SERPL-MCNC: 119 MG/DL (ref 65–140)
GLUCOSE SERPL-MCNC: 129 MG/DL (ref 65–140)
GLUCOSE SERPL-MCNC: 149 MG/DL (ref 65–140)
GLUCOSE SERPL-MCNC: 154 MG/DL (ref 65–140)
HCT VFR BLD AUTO: 39.5 % (ref 36.5–49.3)
HGB BLD-MCNC: 12.7 G/DL (ref 12–17)
MCH RBC QN AUTO: 27 PG (ref 26.8–34.3)
MCHC RBC AUTO-ENTMCNC: 32.2 G/DL (ref 31.4–37.4)
MCV RBC AUTO: 84 FL (ref 82–98)
PLATELET # BLD AUTO: 185 THOUSANDS/UL (ref 149–390)
PMV BLD AUTO: 10.7 FL (ref 8.9–12.7)
POTASSIUM SERPL-SCNC: 3.7 MMOL/L (ref 3.5–5.3)
RBC # BLD AUTO: 4.71 MILLION/UL (ref 3.88–5.62)
SODIUM SERPL-SCNC: 139 MMOL/L (ref 135–147)
VIT B12 SERPL-MCNC: 519 PG/ML (ref 180–914)
WBC # BLD AUTO: 7.33 THOUSAND/UL (ref 4.31–10.16)

## 2025-02-12 PROCEDURE — 99239 HOSP IP/OBS DSCHRG MGMT >30: CPT | Performed by: INTERNAL MEDICINE

## 2025-02-12 PROCEDURE — 85027 COMPLETE CBC AUTOMATED: CPT | Performed by: INTERNAL MEDICINE

## 2025-02-12 PROCEDURE — 82948 REAGENT STRIP/BLOOD GLUCOSE: CPT

## 2025-02-12 PROCEDURE — 82607 VITAMIN B-12: CPT | Performed by: INTERNAL MEDICINE

## 2025-02-12 PROCEDURE — 80048 BASIC METABOLIC PNL TOTAL CA: CPT | Performed by: INTERNAL MEDICINE

## 2025-02-12 RX ORDER — INSULIN LISPRO 100 [IU]/ML
5 INJECTION, SOLUTION INTRAVENOUS; SUBCUTANEOUS
Start: 2025-02-12

## 2025-02-12 RX ORDER — INSULIN GLARGINE 100 [IU]/ML
18 INJECTION, SOLUTION SUBCUTANEOUS
Start: 2025-02-12

## 2025-02-12 RX ORDER — AMLODIPINE BESYLATE 5 MG/1
5 TABLET ORAL DAILY
Start: 2025-02-13

## 2025-02-12 RX ADMIN — HEPARIN SODIUM 5000 UNITS: 5000 INJECTION INTRAVENOUS; SUBCUTANEOUS at 13:57

## 2025-02-12 RX ADMIN — INSULIN LISPRO 1 UNITS: 100 INJECTION, SOLUTION INTRAVENOUS; SUBCUTANEOUS at 12:07

## 2025-02-12 RX ADMIN — INSULIN LISPRO 5 UNITS: 100 INJECTION, SOLUTION INTRAVENOUS; SUBCUTANEOUS at 08:59

## 2025-02-12 RX ADMIN — AMLODIPINE BESYLATE 5 MG: 5 TABLET ORAL at 08:58

## 2025-02-12 RX ADMIN — HEPARIN SODIUM 5000 UNITS: 5000 INJECTION INTRAVENOUS; SUBCUTANEOUS at 05:17

## 2025-02-12 RX ADMIN — ESCITALOPRAM OXALATE 5 MG: 5 TABLET, FILM COATED ORAL at 08:58

## 2025-02-12 RX ADMIN — INSULIN LISPRO 5 UNITS: 100 INJECTION, SOLUTION INTRAVENOUS; SUBCUTANEOUS at 12:08

## 2025-02-12 NOTE — PROGRESS NOTES
Progress Note - Hospitalist   Name: Renard Antoine 79 y.o. male I MRN: [id number]  Unit/Bed#: -01 I Date of Admission: 2/9/2025   Date of Service: 2/12/2025 I Hospital Day: 3    Assessment & Plan  Type 2 diabetes mellitus with hyperglycemia, with long-term current use of insulin ([facility])  Lab Results   Component Value Date    HGBA1C 9.9 (H) 02/09/2025     Recent Labs     02/11/25  2051 02/12/25  0304 02/12/25  0520 02/12/25  1026   POCGLU 285* 149* 129 154*     Blood Sugar Average: Last 72 hrs:  (P) 209.4443163095578297    H/o DM type 2 maintained on insulin & metformin PTA who presents to ED w/ hyperglycemia, polyuria and weakness 2/2 noncompliance w/ insulin therapy.  Normal AG, mildly elevated beta hydroxybutyrate at 0.46; not consistent w/ DKA  BG >500 on admit  Continue lantus at 18 units HS  C/w Lispro 5 U TID & SSI AC/QHS   Hold PTA metformin  Diabetic diet  Hypoglycemia protocol  Would benefit from ambulatory referral to endocrinology at discharge vs. close PCP follow-up  CM consulted for safe dispo planning; unable to self administer medication safely  RENZO (acute kidney injury) ([facility])  Recent Labs     02/09/25  1554 02/10/25  0423 02/12/25  0458   CREATININE 1.32* 1.13 0.95     Baseline Cr. 0.8-1.0; peak Cr./ Cr. on admit 1.32   Suspect prerenal etiology w/ poor PO intake & ARB use   UA w/ 2+ protein, large blood, negative nitrites, elevated glucose  Hold losartan & metformin   Monitor BMP; avoid nephrotoxins/hypotension  Resolved  Metabolic encephalopathy  2/2 dehydration, RENZO and hyperglycemia  Check B12  Supportive care as above; near baseline mentation now   Mild cognitive impairment  Outpatient notes reviewed  C/w PTA donepezil & Lexapro   Case management consulted for safe dispo plan; daughter is POA and notes pt cannot return home as he needs assistance with ADLs and was residing with his ex-wife  Discharge planning to rehab and then LTC  Essential hypertension  SBP variable: 116-166   PTA  losartan held due to RENZO  C/w recently added amlodipine 5 mg q day   PRN hydralazine  History of atrial fibrillation  Currently in NSR  Not maintained on AV yi agents or AC  Monitor  Oropharyngeal dysphagia  H/o   ST evaluated   C/w puree with nectar thick liquids   NPH (normal pressure hydrocephalus) ([facility])  H/o per chart review  S/p shunt; outpt routine neurosurgery f/u   Urinary frequency  Pt daughter reports that he has had trouble with incontinence for many years but never had it evaluated.  Bladder scan this AM with a PVR of 180ml  Outpatient referral to urology for further evaluation    VTE Pharmacologic Prophylaxis:   Moderate Risk (Score 3-4) - Pharmacological DVT Prophylaxis Ordered: heparin.    Mobility:   Basic Mobility Inpatient Raw Score: 12  JH-HLM Goal: 4: Move to chair/commode  JH-HLM Achieved: 6: Walk 10 steps or more  JH-HLM Goal NOT achieved. Continue with multidisciplinary rounding and encourage appropriate mobility to improve upon JH-HLM goals.    Patient Centered Rounds: I performed bedside rounds with nursing staff today.   Discussions with Specialists or Other Care Team Provider: nurseEZIO    Current Length of Stay: 3 day(s)  Current Patient Status: Inpatient   Certification Statement: The patient will continue to require additional inpatient hospital stay due to discharge planning  Discharge Plan: Anticipate discharge tomorrow to rehab facility. When an insurance authorization is complete    Code Status: Level 3 - DNAR and DNI    Subjective   Denies any new complaints.    Objective :  Temp:  [98.1 °F (36.7 °C)-98.3 °F (36.8 °C)] 98.3 °F (36.8 °C)  HR:  [76] 76  BP: (137-144)/(66-79) 144/73  Resp:  [18] 18  SpO2:  [95 %] 95 %  O2 Device: None (Room air)    Body mass index is 21.43 kg/m².     Input and Output Summary (last 24 hours):     Intake/Output Summary (Last 24 hours) at 2/12/2025 1318  Last data filed at 2/12/2025 0726  Gross per 24 hour   Intake 180 ml   Output 600 ml   Net -420 ml        Physical Exam  Constitutional:       Appearance: Normal appearance.   HENT:      Head: Normocephalic and atraumatic.      Nose: Nose normal.   Eyes:      Extraocular Movements: Extraocular movements intact.   Pulmonary:      Effort: Pulmonary effort is normal.   Musculoskeletal:      Right lower leg: No edema.      Left lower leg: No edema.   Neurological:      Mental Status: He is alert. Mental status is at baseline.   Psychiatric:         Mood and Affect: Mood normal.         Behavior: Behavior normal.           Lines/Drains:              Lab Results: I have reviewed the following results:   Results from last 7 days   Lab Units 02/12/25  0458 02/10/25  0423   WBC Thousand/uL 7.33 7.87   HEMOGLOBIN g/dL 12.7 14.5   HEMATOCRIT % 39.5 46.2   PLATELETS Thousands/uL 185 207   SEGS PCT %  --  67   LYMPHO PCT %  --  19   MONO PCT %  --  11   EOS PCT %  --  2     Results from last 7 days   Lab Units 02/12/25  0458 02/10/25  0423 02/09/25  1554   SODIUM mmol/L 139   < > 138   POTASSIUM mmol/L 3.7   < > 4.5   CHLORIDE mmol/L 104   < > 99   CO2 mmol/L 29   < > 28   BUN mg/dL 16   < > 30*   CREATININE mg/dL 0.95   < > 1.32*   ANION GAP mmol/L 6   < > 11   CALCIUM mg/dL 9.0   < > 10.4*   ALBUMIN g/dL  --   --  4.0   TOTAL BILIRUBIN mg/dL  --   --  0.64   ALK PHOS U/L  --   --  151*   ALT U/L  --   --  18   AST U/L  --   --  18   GLUCOSE RANDOM mg/dL 111   < > 552*    < > = values in this interval not displayed.         Results from last 7 days   Lab Units 02/12/25  1026 02/12/25  0520 02/12/25  0304 02/11/25  2051 02/11/25  1838 02/11/25  1648 02/11/25  1615 02/11/25  1220 02/11/25  1101 02/11/25  0637 02/11/25  0607 02/10/25  2108   POC GLUCOSE mg/dl 154* 129 149* 285* 174* 81 54* 122 175* 78 62* 156*     Results from last 7 days   Lab Units 02/09/25  1554   HEMOGLOBIN A1C % 9.9*           Recent Cultures (last 7 days):         Imaging Results Review: No pertinent imaging studies reviewed.  Other Study Results Review:  No additional pertinent studies reviewed.    Last 24 Hours Medication List:     Current Facility-Administered Medications:     amLODIPine (NORVASC) tablet 5 mg, Daily    atorvastatin (LIPITOR) tablet 20 mg, HS    donepezil (ARICEPT) tablet 5 mg, HS    escitalopram (LEXAPRO) tablet 5 mg, Daily    heparin (porcine) subcutaneous injection 5,000 Units, Q8H MAURO    hydrALAZINE (APRESOLINE) injection 5 mg, Q6H PRN    insulin glargine (LANTUS) subcutaneous injection 18 Units 0.18 mL, HS    insulin lispro (HumALOG/ADMELOG) 100 units/mL subcutaneous injection 1-5 Units, TID AC **AND** Fingerstick Glucose (POCT), TID AC    insulin lispro (HumALOG/ADMELOG) 100 units/mL subcutaneous injection 1-5 Units, HS    insulin lispro (HumALOG/ADMELOG) 100 units/mL subcutaneous injection 5 Units, TID With Meals    Administrative Statements   Today, Patient Was Seen By: Charly Joyce MD  I have spent a total time of 40 minutes in caring for this patient on the day of the visit/encounter including Diagnostic results, Instructions for management, Patient and family education, Impressions, Counseling / Coordination of care, Documenting in the medical record, Reviewing / ordering tests, medicine, procedures  , Obtaining or reviewing history  , and Communicating with other healthcare professionals .    **Please Note: This note may have been constructed using a voice recognition system.**

## 2025-02-12 NOTE — CASE MANAGEMENT
[facility] received request for authorization from Care Manager.  Authorization request submitted for: [facility]  Facility Name:  [facility] [id number]  Facility MD: Dr. James [id number]   Authorization initiated by contacting insurance:  [employer]  Via: [employer] Portal   Clinicals submitted via [employer] attachment   Pending Reference #: [id number]     Care Manager notified: Angela SIFUENTES      Updates to authorization status will be noted in chart. Please reach out to CM for updates on any clinical information.

## 2025-02-12 NOTE — ASSESSMENT & PLAN NOTE
2/2 dehydration, RENZO and hyperglycemia  Check B12  Supportive care as above; near baseline mentation now

## 2025-02-12 NOTE — ASSESSMENT & PLAN NOTE
Lab Results   Component Value Date    HGBA1C 9.9 (H) 02/09/2025     Recent Labs     02/11/25 2051 02/12/25  0304 02/12/25  0520 02/12/25  1026   POCGLU 285* 149* 129 154*     Blood Sugar Average: Last 72 hrs:  (P) [id number]    H/o DM type 2 maintained on insulin & metformin PTA who presents to ED w/ hyperglycemia, polyuria and weakness 2/2 noncompliance w/ insulin therapy.  Normal AG, mildly elevated beta hydroxybutyrate at 0.46; not consistent w/ DKA  BG >500 on admit  Continue lantus at 18 units HS  C/w Lispro 5 U TID & SSI AC/QHS   Hold PTA metformin  Diabetic diet  Hypoglycemia protocol  Would benefit from ambulatory referral to endocrinology at discharge vs. close PCP follow-up  CM consulted for safe dispo planning; unable to self administer medication safely

## 2025-02-12 NOTE — PROGRESS NOTES
Patient:    MRN:  [id number]    [place] Request ID:  [id number]    Level of care reserved:  Skilled Nursing Facility    Partner Reserved:  [facility], [place] [address] [phone]    Clinical needs requested:    Geography searched:  10 miles around [phone]    Start of Service:    Request sent:  12:31pm EST on 2/11/2025 by Ashley Lowery    Partner reserved:  12:46pm EST on 2/12/2025 by Angela Bassett (maggie)    Choice list shared:  12:05pm EST on 2/12/2025 by Angela Bassett (maggie)

## 2025-02-12 NOTE — ASSESSMENT & PLAN NOTE
Recent Labs     02/09/25  1554 02/10/25  0423 02/12/25  0458   CREATININE 1.32* 1.13 0.95     Baseline Cr. 0.8-1.0; peak Cr./ Cr. on admit 1.32   Suspect prerenal etiology w/ poor PO intake & ARB use   UA w/ 2+ protein, large blood, negative nitrites, elevated glucose  Hold losartan & metformin   Monitor BMP; avoid nephrotoxins/hypotension  Resolved

## 2025-02-12 NOTE — PLAN OF CARE
Problem: Potential for Falls  Goal: Patient will remain free of falls  Description: INTERVENTIONS:  - Educate patient/family on patient safety including physical limitations  - Instruct patient to call for assistance with activity   - Consult OT/PT to assist with strengthening/mobility   - Keep Call bell within reach  - Keep bed low and locked with side rails adjusted as appropriate  - Keep care items and personal belongings within reach  - Initiate and maintain comfort rounds  - Make Fall Risk Sign visible to staff  - Offer Toileting every 2 Hours, in advance of need  - Initiate/Maintain bed alarm  - Apply yellow socks and bracelet for high fall risk patients  - Consider moving patient to room near nurses station  Outcome: Progressing     Problem: Prexisting or High Potential for Compromised Skin Integrity  Goal: Skin integrity is maintained or improved  Description: INTERVENTIONS:  - Identify patients at risk for skin breakdown  - Assess and monitor skin integrity  - Assess and monitor nutrition and hydration status  - Monitor labs   - Assess for incontinence   - Turn and reposition patient  - Assist with mobility/ambulation  - Relieve pressure over bony prominences  - Avoid friction and shearing  - Provide appropriate hygiene as needed including keeping skin clean and dry  - Evaluate need for skin moisturizer/barrier cream  - Collaborate with interdisciplinary team   - Patient/family teaching  - Consider wound care consult   Outcome: Progressing     Problem: Nutrition/Hydration-ADULT  Goal: Nutrient/Hydration intake appropriate for improving, restoring or maintaining nutritional needs  Description: Monitor and assess patient's nutrition/hydration status for malnutrition. Collaborate with interdisciplinary team and initiate plan and interventions as ordered.  Monitor patient's weight and dietary intake as ordered or per policy. Utilize nutrition screening tool and intervene as necessary. Determine patient's food  preferences and provide high-protein, high-caloric foods as appropriate.     INTERVENTIONS:  - Monitor oral intake, urinary output, labs, and treatment plans  - Assess nutrition and hydration status and recommend course of action  - Evaluate amount of meals eaten  - Assist patient with eating if necessary   - Allow adequate time for meals  - Recommend/ encourage appropriate diets, oral nutritional supplements, and vitamin/mineral supplements  - Order, calculate, and assess calorie counts as needed  - Recommend, monitor, and adjust tube feedings and TPN/PPN based on assessed needs  - Assess need for intravenous fluids  - Provide specific nutrition/hydration education as appropriate  - Include patient/family/caregiver in decisions related to nutrition  Outcome: Progressing

## 2025-02-12 NOTE — DISCHARGE SUMMARY
Discharge Summary - Hospitalist   Name: Renard Antoine 79 y.o. male I MRN: [id number]  Unit/Bed#: -01 I Date of Admission: 2/9/2025   Date of Service: 2/12/2025 I Hospital Day: 3     Assessment & Plan  Type 2 diabetes mellitus with hyperglycemia, with long-term current use of insulin ([facility])  Lab Results   Component Value Date    HGBA1C 9.9 (H) 02/09/2025     Recent Labs     02/11/25  2051 02/12/25  0304 02/12/25  0520 02/12/25  1026   POCGLU 285* 149* 129 154*     Blood Sugar Average: Last 72 hrs:  (P) [id number]    H/o DM type 2 maintained on insulin & metformin PTA who presents to ED w/ hyperglycemia, polyuria and weakness 2/2 noncompliance w/ insulin therapy.  Normal AG, mildly elevated beta hydroxybutyrate at 0.46; not consistent w/ DKA  BG >500 on admit  Continue lantus at 18 units HS  C/w Lispro 5 U TID & SSI AC/QHS   Hold PTA metformin  Diabetic diet  Hypoglycemia protocol  Would benefit from ambulatory referral to endocrinology at discharge vs. close PCP follow-up  Discharge planning to IP rehabilitation  RENZO (acute kidney injury) ([facility])  Recent Labs     02/09/25  1554 02/10/25  0423 02/12/25  0458   CREATININE 1.32* 1.13 0.95     Baseline Cr. 0.8-1.0; peak Cr./ Cr. on admit 1.32   Suspect prerenal etiology w/ poor PO intake & ARB use   UA w/ 2+ protein, large blood, negative nitrites, elevated glucose  Monitor BMP; avoid nephrotoxins/hypotension  Resolved  Resume losartan on discharge  Metabolic encephalopathy  2/2 dehydration, RENZO and hyperglycemia  Check B12  Supportive care as above; near baseline mentation now   Mild cognitive impairment  Outpatient notes reviewed  C/w PTA donepezil & Lexapro   Case management consulted for safe dispo plan; daughter is POA and notes pt cannot return home as he needs assistance with ADLs and was residing with his ex-wife  Discharge planning to rehab and then LTC  Essential hypertension  Losartan held due to RENZO, now resumed  C/w recently added  amlodipine 5 mg q day   History of atrial fibrillation  Currently in NSR  Not maintained on AV yi agents or AC  Monitor  Oropharyngeal dysphagia  H/o   ST evaluated   C/w puree with nectar thick liquids   NPH (normal pressure hydrocephalus) (HCC)  H/o per chart review  S/p shunt; outpt routine neurosurgery f/u   Urinary frequency  Pt daughter reports that he has had trouble with incontinence for many years but never had it evaluated.  Bladder scan this AM with a PVR of 180ml  Outpatient referral to urology for further evaluation     Medical Problems       Resolved Problems  Date Reviewed: 2/28/2024   None       Discharging Physician / Practitioner: Charly Joyce MD  PCP: Armando Lozada DO  Admission Date:   Admission Orders (From admission, onward)       Ordered        02/09/25 1813  INPATIENT ADMISSION  Once                          Discharge Date: 02/12/25    Disposition:    Other Skilled Nursing Facility at [facility]    Reason for Admission: uncontrolled diabetes    Discharge Diagnoses:   Please see assessment and plan section above for further details regarding discharge diagnoses.     Consultations During Hospital Stay:  None    Procedures Performed:   None     Significant Findings / Test Results:   None    Incidental Findings:   None       Test Results Pending at Discharge (will require follow up):   None     Outpatient Tests Requested:  None    Complications:  None    Hospital Course:    Renard Antoine is a 79 y.o. male patient who originally presented to the hospital on 2/9/2025 due to uncontrolled diabetes. He was admitted and glucose control was achieved with SQ insulin. After discussing with his daughter, he was deemed unsafe to return home as he is unable to manage his diabetes on his own. He was discharged to inpatient rehabilitation with a plan to transition to long term care afterwards. His daughter also reports that he has been having chronic urinary incontinence, there was no  "urinary retention noted on bladder scan, this was attributed to uncontrolled diabetes but he was referred to urology outpatient for further evaluation.    Condition at Discharge: stable    Discharge Day Visit / Exam:   Subjective:  denies any new complaints.  Vitals: Blood Pressure: 144/73 (02/12/25 0726)  Pulse: 76 (02/11/25 1533)  Temperature: 98.3 °F (36.8 °C) (02/12/25 0726)  Temp Source: Oral (02/10/25 1702)  Respirations: 18 (02/12/25 0726)  Height: 5' 6\" (167.6 cm) (02/10/25 1702)  Weight - Scale: 60.2 kg (132 lb 12.8 oz) (RN notified.) (02/11/25 0600)  SpO2: 95 % (02/11/25 1533)  Physical Exam  Constitutional:       Appearance: Normal appearance.   HENT:      Head: Normocephalic and atraumatic.      Nose: Nose normal.   Eyes:      Extraocular Movements: Extraocular movements intact.   Cardiovascular:      Rate and Rhythm: Normal rate and regular rhythm.   Pulmonary:      Effort: Pulmonary effort is normal.      Breath sounds: No wheezing or rales.   Skin:     General: Skin is warm and dry.   Neurological:      Mental Status: He is alert. Mental status is at baseline.   Psychiatric:         Mood and Affect: Mood normal.         Behavior: Behavior normal.            Medication Adjustments and Discharge Medications:  Discharge Medication List: See after visit summary for reconciled discharge medications.   Medication Dosing Tapers - Please refer to Discharge Medication List for details on any medication dosing tapers (if applicable to patient).   Summary of Medication Adjustments made as a result of this hospitalization: see list  Medications being temporarily held (include recommended restart time): None    Wound Care Recommendations:  When applicable, please see wound care section of After Visit Summary.    Instructions for any Catheters / Lines Present at Discharge (including removal date, if applicable): None    Diet Recommendations at Discharge:  Diet -        Diet Orders   (From admission, onward)    "              Start     Ordered    02/10/25 1616  Diet Phillip/CHO Controlled; Consistent Carbohydrate Diet Level 1 (4 carb servings/60 grams CHO/meal); Dysphagia 1-Pureed; Nectar Thick Liquid  Diet effective now        References:    Adult Nutrition Support Algorithm    RD Therapeutic Diet Order Protocol   Question Answer Comment   Diet Type Phillip/CHO Controlled    Phillip/CHO Controlled Consistent Carbohydrate Diet Level 1 (4 carb servings/60 grams CHO/meal)    Other Restriction(s): Dysphagia 1-Pureed    Liquid Modifier Nectar Thick Liquid    RD to adjust diet per protocol? Yes        02/10/25 1616                    Mobility at time of Discharge:   Basic Mobility Inpatient Raw Score: 12  -HLM Goal: 4: Move to chair/commode  JH-HLM Achieved: 6: Walk 10 steps or more  HLM Goal NOT achieved. Continue to encourage mobility in post discharge setting.    Goals of Care Discussions:  Code Status at Discharge: Level 3 - DNAR and DNI  Goals of care were not discussed during this admission.    Discharge instructions/Information to patient and family:   See After Visit Summary (AVS) titled Discharge Instructions for information provided to patient and family.      Planned Readmission: No      Administrative Statements   Discharge Statement:  I have spent a total time of 45 minutes in caring for this patient on the day of the visit/encounter. >30 minutes of time was spent on: Diagnostic results, Instructions for management, Patient and family education, Impressions, Counseling / Coordination of care, Documenting in the medical record, Reviewing / ordering tests, medicine, procedures  , and Communicating with other healthcare professionals .    **Please Note: This note may have been constructed using a voice recognition system.**

## 2025-02-12 NOTE — ASSESSMENT & PLAN NOTE
Lab Results   Component Value Date    HGBA1C 9.9 (H) 02/09/2025     Recent Labs     02/11/25 2051 02/12/25  0304 02/12/25  0520 02/12/25  1026   POCGLU 285* 149* 129 154*     Blood Sugar Average: Last 72 hrs:  (P) [id number]    H/o DM type 2 maintained on insulin & metformin PTA who presents to ED w/ hyperglycemia, polyuria and weakness 2/2 noncompliance w/ insulin therapy.  Normal AG, mildly elevated beta hydroxybutyrate at 0.46; not consistent w/ DKA  BG >500 on admit  Continue lantus at 18 units HS  C/w Lispro 5 U TID & SSI AC/QHS   Hold PTA metformin  Diabetic diet  Hypoglycemia protocol  Would benefit from ambulatory referral to endocrinology at discharge vs. close PCP follow-up  Discharge planning to IP rehabilitation

## 2025-02-12 NOTE — CASE MANAGEMENT
Case Management Discharge Planning Note    Patient name Renard Antoine  Location /-01 MRN [id number]  : 1945 Date 2025       Current Admission Date: 2025  Current Admission Diagnosis:Type 2 diabetes mellitus with hyperglycemia, with long-term current use of insulin ([facility])   Patient Active Problem List    Diagnosis Date Noted Date Diagnosed    Urinary frequency 2025     RENZO (acute kidney injury) ([facility]) 2025     Metabolic encephalopathy 2025     Right leg pain 07/15/2024     Right calf pain 07/15/2024     Balance disorder 07/15/2024     Primary osteoarthritis involving multiple joints 2024     Cervical spondylosis 2023     Cardiac murmur      Primary osteoarthritis of both knees 2022     Viral infection 2022     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[facility]) 2022     Neck pain 2022     Mild cognitive impairment 2022     History of atrial fibrillation 2022     Stage 2 chronic kidney disease 2021     Mixed hyperlipidemia 2020     Presbycusis of both ears 10/09/2020     Chronic low back pain 10/09/2020     Chronic kidney disease (CKD) stage G1/A2, glomerular filtration rate (GFR) equal to or greater than 90 mL/min/1.73 square meter and albuminuria creatinine ratio between  mg/g 2018     Oropharyngeal dysphagia 2018     Myofascial pain syndrome, cervical 2018     H/O aortic valve replacement 2018     NPH (normal pressure hydrocephalus) ([facility]) 2016     Essential hypertension 2016     Type 2 diabetes mellitus with hyperglycemia, with long-term current use of insulin ([facility]) 2016     Coronary artery disease 2015     Aortic prosthetic valve regurgitation 2014     Aortic stenosis 2014       LOS (days): 3  Geometric Mean LOS (GMLOS) (days): 4  Days to GMLOS:1.2     OBJECTIVE:  Risk of Unplanned Readmission Score: 7.02         Current admission status: Inpatient    Preferred Pharmacy:   [facility] - [place], [place] - [address]  [address]  [place] [address]  Phone: [phone] Fax: [phone]    Primary Care Provider: Armando Lozada DO    Primary Insurance: [facility] REP  Secondary Insurance:     DISCHARGE DETAILS:    Discharge planning discussed with:: Patient and daughterDamaris at bedside  Freedom of Choice: Yes  Comments - Freedom of Choice: Provider list discussed and chose [facility]  CM contacted family/caregiver?: Yes  Were Treatment Team discharge recommendations reviewed with patient/caregiver?: Yes  Did patient/caregiver verbalize understanding of patient care needs?: Yes  Were patient/caregiver advised of the risks associated with not following Treatment Team discharge recommendations?: Yes    Contacts  Patient Contacts: daughter Damaris Benjamin   Relationship to Patient:: Family  Contact Method: In Person  Reason/Outcome: Discharge Planning        Other Referral/Resources/Interventions Provided:  Referral Comments: CM met with pt and daughterDamaris at bedside to discuss provider list choices for [facility]/LTC facilities.  Daughter chose [facility]. Daughter aware she will need to meet with that facility to apply for MA.  A copy of the POA was made and placed in the medical record bin.  Treatment Team Recommendation: SNF, Short Term Rehab  Discharge Destination Plan:: SNF, Short Term Rehab      UPDATE:  Insurance Auth submitted to [employer] DC support going to [facility].   UPDATE:  Received a message from Rose Marie Whitmore from [facility] that they can take pending auth.  CM phoned Damaris woodard who agreed with transport today. ..IMM reviewed with patient's caregiver, patient's caregiver agrees with discharge determination.

## 2025-02-12 NOTE — ASSESSMENT & PLAN NOTE
Recent Labs     02/09/25  1554 02/10/25  0423 02/12/25  0458   CREATININE 1.32* 1.13 0.95     Baseline Cr. 0.8-1.0; peak Cr./ Cr. on admit 1.32   Suspect prerenal etiology w/ poor PO intake & ARB use   UA w/ 2+ protein, large blood, negative nitrites, elevated glucose  Monitor BMP; avoid nephrotoxins/hypotension  Resolved  Resume losartan on discharge

## 2025-02-13 ENCOUNTER — TRANSITIONAL CARE MANAGEMENT (OUTPATIENT)
Dept: INTERNAL MEDICINE CLINIC | Facility: CLINIC | Age: 80
End: 2025-02-13

## 2025-02-13 PROCEDURE — TCMXX

## 2025-02-13 NOTE — CASE MANAGEMENT
Case Management Discharge Planning Note    Patient name Renard Antoine  Location /-01 MRN [id number]  : 1945 Date 2025       Current Admission Date: 2025  Current Admission Diagnosis:Type 2 diabetes mellitus with hyperglycemia, with long-term current use of insulin ([facility])   Patient Active Problem List    Diagnosis Date Noted Date Diagnosed    Urinary frequency 2025     RENZO (acute kidney injury) ([facility]) 2025     Metabolic encephalopathy 2025     Right leg pain 07/15/2024     Right calf pain 07/15/2024     Balance disorder 07/15/2024     Primary osteoarthritis involving multiple joints 2024     Cervical spondylosis 2023     Cardiac murmur      Primary osteoarthritis of both knees 2022     Viral infection 2022     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[facility]) 2022     Neck pain 2022     Mild cognitive impairment 2022     History of atrial fibrillation 2022     Stage 2 chronic kidney disease 2021     Mixed hyperlipidemia 2020     Presbycusis of both ears 10/09/2020     Chronic low back pain 10/09/2020     Chronic kidney disease (CKD) stage G1/A2, glomerular filtration rate (GFR) equal to or greater than 90 mL/min/1.73 square meter and albuminuria creatinine ratio between  mg/g 2018     Oropharyngeal dysphagia 2018     Myofascial pain syndrome, cervical 2018     H/O aortic valve replacement 2018     NPH (normal pressure hydrocephalus) ([facility]) 2016     Essential hypertension 2016     Type 2 diabetes mellitus with hyperglycemia, with long-term current use of insulin ([facility]) 2016     Coronary artery disease 2015     Aortic prosthetic valve regurgitation 2014     Aortic stenosis 2014       LOS (days): 3  Geometric Mean LOS (GMLOS) (days): 4  Days to GMLOS:1.1     OBJECTIVE:  Risk of Unplanned Readmission Score: 7.2         Current admission status: Inpatient    Preferred Pharmacy:   [facility] - [place], [place] - [address]  [address]  [place] [address]  Phone: [phone] Fax: [phone]    Primary Care Provider: Armando Lozada DO    Primary Insurance: [facility] REP  Secondary Insurance:     DISCHARGE DETAILS:                                                                                                               Facility Insurance Auth Number: [id number]

## 2025-02-13 NOTE — CASE MANAGEMENT
[facility] has received APPROVED authorization.  Insurance:   [employer]   Auth obtained via fax.  Authorization received for: SNF  Facility:  [facility]   Authorization #:[id number]   Start of Care: 2/13  Next Review Date:2/18  Continued Stay Care Coordinator: Maria Alejandra GAXIOLA#: [phone]  Submit next review to: [phone]     Care Manager notified:Angela SIFUENTES     Please reach out to  for updates on any clinical information.

## 2025-02-17 ENCOUNTER — TELEPHONE (OUTPATIENT)
Age: 80
End: 2025-02-17

## 2025-02-17 NOTE — TELEPHONE ENCOUNTER
New Patient    Appointment Scheduling  What office location does the patient prefer?: [place]  What is the reason for the patient's appointment?: Referral for urinary frequency  Have patient records been requested?: in epic  If No, are the records showing in Epic:     Appointment Details  Date: 3/10/25  Time:  140pm  Location:  [place]  Provider:  Isaac Stein  Does the appointment need further review? (Reason)      HISTORY  Is the patient having active symptoms? If so, describe symptoms: urinary frequency  Has the patient had any previous Urologist(s)?: no  Was the patient seen in the ED?: yes  Has the patient had any outside testing done?: no  Does patient have Imaging/Lab Results: no  Does the patient have a personal history of any cancer?: no    INSURANCE   Have you confirmed Patient's insurance? yes  Is the insurance accepted?  yes  Is the insurance active? yes

## 2025-02-27 ENCOUNTER — TRANSITIONAL CARE MANAGEMENT (OUTPATIENT)
Dept: INTERNAL MEDICINE CLINIC | Facility: CLINIC | Age: 80
End: 2025-02-27

## 2025-02-28 ENCOUNTER — RA CDI HCC (OUTPATIENT)
Dept: OTHER | Facility: HOSPITAL | Age: 80
End: 2025-02-28

## 2025-02-28 NOTE — PROGRESS NOTES
HCC coding opportunities          Chart Reviewed number of suggestions sent to Provider: 2  E11.22  E11.65  G91.2     Patients Insurance     Medicare Insurance: [employer]vantage

## 2025-03-04 ENCOUNTER — OFFICE VISIT (OUTPATIENT)
Dept: INTERNAL MEDICINE CLINIC | Facility: CLINIC | Age: 80
End: 2025-03-04
Payer: COMMERCIAL

## 2025-03-04 VITALS
BODY MASS INDEX: 21.79 KG/M2 | SYSTOLIC BLOOD PRESSURE: 110 MMHG | DIASTOLIC BLOOD PRESSURE: 80 MMHG | HEART RATE: 66 BPM | OXYGEN SATURATION: 97 % | WEIGHT: 135 LBS

## 2025-03-04 DIAGNOSIS — F32.1 MODERATE MAJOR DEPRESSION (HCC): ICD-10-CM

## 2025-03-04 DIAGNOSIS — N17.9 ACUTE KIDNEY INJURY (HCC): ICD-10-CM

## 2025-03-04 DIAGNOSIS — E11.22 TYPE 2 DIABETES MELLITUS WITH CHRONIC KIDNEY DISEASE, WITHOUT LONG-TERM CURRENT USE OF INSULIN, UNSPECIFIED CKD STAGE (HCC): Primary | ICD-10-CM

## 2025-03-04 DIAGNOSIS — Z91.148 NONCOMPLIANCE WITH MEDICATION REGIMEN: ICD-10-CM

## 2025-03-04 DIAGNOSIS — R01.1 HEART MURMUR: ICD-10-CM

## 2025-03-04 PROCEDURE — 99496 TRANSJ CARE MGMT HIGH F2F 7D: CPT | Performed by: INTERNAL MEDICINE

## 2025-03-04 NOTE — ASSESSMENT & PLAN NOTE
Patient is here for transition care management visit regarding uncontrolled diabetes type 2 today I did review the discharge summary laboratories and test completed during hospitalization which showed uncontrollable sugar, dehydration and RENZO.  The patient was initially discharged to rehab facility but left within a week because of difficulties with his roommate he is now at home with his wife his daughter is accompanying him today reports me he is now using his insulin regularly he is eating regularly.  He does not have any further thirst or blurry vision with regards to his difficulty with urination he will be seeing a urologist for further evaluation.  Patient will be starting home physical therapy, Occupational Therapy.  I would like him to meet with pharmacist Radha I will have him see endocrinology who would like to discuss going off insulin and changing to pill form treatment diabetic labs have been ordered  Lab Results   Component Value Date    HGBA1C 9.9 (H) 02/09/2025     Orders:  •  Hemoglobin A1C; Future  •  Comprehensive metabolic panel; Future  •  Lipid Panel with Direct LDL reflex; Future  •  Albumin / creatinine urine ratio; Future  •  Ambulatory referral to clinical pharmacy; Future  •  Ambulatory Referral to Endocrinology; Future  •  Ambulatory Referral to Ophthalmology; Future

## 2025-03-04 NOTE — ASSESSMENT & PLAN NOTE
Today I did  the patient about proper use of his medication insulin and proper diet I also explained to the patient the risk of death if not taking insulin correctly and the risk of hypoglycemia if not eating correctly with insulin he understands this and has now become more regiment he is more compliant taking his medicines and eating regularly I will have him meet with pharmacist Radha in regards     RTO in 3 months call if any problems

## 2025-03-04 NOTE — PROGRESS NOTES
Transition of Care Visit  Name: Renard Antoine      : 1945      MRN: [id number]  Encounter Provider: Armando Lozada DO  Encounter Date: 3/4/2025   Encounter department: [facility]  Type 2 diabetes mellitus with hyperglycemia, with long-term current use of insulin ([facility])        Lab Results   Component Value Date     HGBA1C 9.9 (H) 2025             Recent Labs     25  2051 25  0304 25  0520 25  1026   POCGLU 285* 149* 129 154*      Blood Sugar Average: Last 72 hrs:  (P) [id number]     H/o DM type 2 maintained on insulin & metformin PTA who presents to ED w/ hyperglycemia, polyuria and weakness 2/2 noncompliance w/ insulin therapy.  Normal AG, mildly elevated beta hydroxybutyrate at 0.46; not consistent w/ DKA  BG >500 on admit  Continue lantus at 18 units HS  C/w Lispro 5 U TID & SSI AC/QHS   Hold PTA metformin  Diabetic diet  Hypoglycemia protocol  Would benefit from ambulatory referral to endocrinology at discharge vs. close PCP follow-up  Discharge planning to IP rehabilitation  RENZO (acute kidney injury) ([facility])        Recent Labs     25  1554 02/10/25  0423 25  0458   CREATININE 1.32* 1.13 0.95      Baseline Cr. 0.8-1.0; peak Cr./ Cr. on admit 1.32   Suspect prerenal etiology w/ poor PO intake & ARB use   UA w/ 2+ protein, large blood, negative nitrites, elevated glucose  Monitor BMP; avoid nephrotoxins/hypotension  Resolved  Resume losartan on discharge  Metabolic encephalopathy  2/2 dehydration, RENZO and hyperglycemia  Check B12  Supportive care as above; near baseline mentation now   Mild cognitive impairment  Outpatient notes reviewed  C/w PTA donepezil & Lexapro   Case management consulted for safe dispo plan; daughter is POA and notes pt cannot return home as he needs assistance with ADLs and was residing with his ex-wife  Discharge planning to rehab and then LTC  Essential hypertension  Losartan held due to RENZO, now  resumed  C/w recently added amlodipine 5 mg q d  Assessment & Plan  Type 2 diabetes mellitus with chronic kidney disease, without long-term current use of insulin, unspecified CKD stage (HCC)  Patient is here for transition care management visit regarding uncontrolled diabetes type 2 today I did review the discharge summary laboratories and test completed during hospitalization which showed uncontrollable sugar, dehydration and RENZO.  The patient was initially discharged to rehab facility but left within a week because of difficulties with his roommate he is now at home with his wife his daughter is accompanying him today reports me he is now using his insulin regularly he is eating regularly.  He does not have any further thirst or blurry vision with regards to his difficulty with urination he will be seeing a urologist for further evaluation.  Patient will be starting home physical therapy, Occupational Therapy.  I would like him to meet with pharmacist Radha I will have him see endocrinology who would like to discuss going off insulin and changing to pill form treatment diabetic labs have been ordered  Lab Results   Component Value Date    HGBA1C 9.9 (H) 02/09/2025     Orders:  •  Hemoglobin A1C; Future  •  Comprehensive metabolic panel; Future  •  Lipid Panel with Direct LDL reflex; Future  •  Albumin / creatinine urine ratio; Future  •  Ambulatory referral to clinical pharmacy; Future  •  Ambulatory Referral to Endocrinology; Future  •  Ambulatory Referral to Ophthalmology; Future    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HCC)  Depression Screening Follow-up Plan: Patient's depression screening was positive with a PHQ-9 score of 6.      Patient reports me he does not feel depressed  Heart murmur  Will check echocardiogram 2D and M-mode  Orders:  •  Echo complete w/ contrast if indicated; Future    Noncompliance with medication regimen  Today I did  the patient about proper use of his medication insulin and proper  diet I also explained to the patient the risk of death if not taking insulin correctly and the risk of hypoglycemia if not eating correctly with insulin he understands this and has now become more regiment he is more compliant taking his medicines and eating regularly I will have him meet with pharmacist Radha in regards     RTO in 3 months call if any problems  Acute kidney injury (HCC)  Secondary to prerenal azotemia dehydration secondary to uncontrolled diabetes patient did receive IV fluids and insulin during the hospitalization which improved the creatinine/GFR will check a comprehensive metabolic panel I did advise the patient drink 4 to 6 glasses of water daily            History of Present Illness     Transitional Care Management Review:   Renard Antoine is a 79 y.o. male here for TCM follow up.  Complex medical patient recent hospitalization at [facility] admission date 2/9/2025 discharge date 2/27/2025 the patient was initially admitted for uncontrolled diabetes secondary to noncompliance and improper eating.  The patient and daughter report to me that he would take his medicine once a day and do good but stopped for several days not taking the insulin this led to blurry vision increased thirst high blood sugar and not feeling well because of this he was brought to the emergency room blood sugar found to be in the 500s he was subsequently admitted today we did review the discharge summary laboratories and test along with the proper use of insulin safety with insulin and monitoring of blood sugars routinely we also discussed the proper heel requirements.  Patient does report me he is feeling much better since taking his insulin and eating a healthy and balanced diet he would like to consider possibly going on a pill regimen for his diabetes he is willing to see an endocrinologist he denies any chest pain short of breath or palpitations.  Of note he was in a rehab center following the  hospitalization but was not happy with his roommate therefore his daughter picked him up and he is now at home with his wife.  She reports me things are going better at this point.  Eating better 3/day now feeling ot/pt and vngerman  was in Rehab eating perfectly fine  he left room mate not nice there     During the TCM phone call patient stated:  TCM Call     Date and time call was made  2/27/2025  3:46 PM    Hospital care reviewed  Records not available    Patient was hospitialized at  [facility]; Other (comment)    Comment  D/C from hospitals to [facility]    Date of Admission  02/09/25    Date of discharge  02/27/25    Diagnosis  Type 2 diabetes mellitus with hyperglycemia, with long-term current use of insulin ([facility])    Disposition  Rehabilitation center    Were the patients medications reviewed and updated  No      TCM Call     Should patient be enrolled in anticoag monitoring?  No    Scheduled for follow up?  Yes    I have advised the patient to call PCP with any new or worsening symptoms  Ashley Dennispatrick MR        HPI  Review of Systems   Constitutional:  Negative for activity change, appetite change and unexpected weight change.   HENT:  Negative for congestion and postnasal drip.    Eyes:  Negative for visual disturbance.   Respiratory:  Negative for cough and shortness of breath.    Cardiovascular:  Negative for chest pain.   Gastrointestinal:  Negative for abdominal pain, diarrhea, nausea and vomiting.   Neurological:  Negative for dizziness, light-headedness and headaches.   Hematological:  Negative for adenopathy.     Objective   /80 (BP Location: Left arm, Patient Position: Sitting, Cuff Size: Standard)   Pulse 66   Wt 61.2 kg (135 lb)   SpO2 97%   BMI 21.79 kg/m²     Physical Exam  Constitutional:       Appearance: He is well-developed.   HENT:      Head: Normocephalic and atraumatic.      Right Ear: External ear normal.      Left Ear: External ear normal.      Nose: Nose normal.   Eyes:       Pupils: Pupils are equal, round, and reactive to light.   Cardiovascular:      Rate and Rhythm: Normal rate and regular rhythm.      Heart sounds: Murmur heard.   Pulmonary:      Effort: Pulmonary effort is normal.      Breath sounds: Normal breath sounds.   Abdominal:      General: There is no distension.      Palpations: Abdomen is soft.      Tenderness: There is no abdominal tenderness. There is no guarding.           Depression Screening Follow-up Plan: Patient's depression screening was positive with a PHQ-9 score of 6. Clinically patient does not have depression. No treatment is required.

## 2025-03-04 NOTE — ASSESSMENT & PLAN NOTE
Secondary to prerenal azotemia dehydration secondary to uncontrolled diabetes patient did receive IV fluids and insulin during the hospitalization which improved the creatinine/GFR will check a comprehensive metabolic panel I did advise the patient drink 4 to 6 glasses of water daily

## 2025-03-04 NOTE — ASSESSMENT & PLAN NOTE
Depression Screening Follow-up Plan: Patient's depression screening was positive with a PHQ-9 score of 6.      Patient reports me he does not feel depressed

## 2025-03-05 ENCOUNTER — TELEPHONE (OUTPATIENT)
Dept: INTERNAL MEDICINE CLINIC | Facility: CLINIC | Age: 80
End: 2025-03-05

## 2025-03-05 NOTE — TELEPHONE ENCOUNTER
Please contact patient to schedule Clinical Pharmacist Appointment     Reason for appointment: diabetes help  When to schedule with Pharmacist: next couple weeks  What should the patient bring to the appointment: blood sugars    Appointment Department:  [facility]  Pharmacist patient will be seeing: Mae Escobar  Visit Type Preference (ie Phone, Video, In-person): in person please  In-person visits will be at:  [facility]  Virtual visits will be completed via [employer] or Phone - please clarify patient preference in appointment notes    Please respond to this note to keep track of the number of patient outreaches.     Please try to reach out to patient on 2 separate days

## 2025-03-10 ENCOUNTER — CONSULT (OUTPATIENT)
Dept: UROLOGY | Facility: AMBULATORY SURGERY CENTER | Age: 80
End: 2025-03-10
Payer: COMMERCIAL

## 2025-03-10 VITALS
BODY MASS INDEX: 21.38 KG/M2 | OXYGEN SATURATION: 96 % | SYSTOLIC BLOOD PRESSURE: 120 MMHG | HEART RATE: 76 BPM | DIASTOLIC BLOOD PRESSURE: 60 MMHG | WEIGHT: 133 LBS | HEIGHT: 66 IN

## 2025-03-10 DIAGNOSIS — R35.0 URINARY FREQUENCY: Primary | ICD-10-CM

## 2025-03-10 LAB
POST-VOID RESIDUAL VOLUME, ML POC: 10 ML
SL AMB  POCT GLUCOSE, UA: NORMAL
SL AMB LEUKOCYTE ESTERASE,UA: NORMAL
SL AMB POCT BILIRUBIN,UA: NORMAL
SL AMB POCT BLOOD,UA: NORMAL
SL AMB POCT CLARITY,UA: CLEAR
SL AMB POCT COLOR,UA: YELLOW
SL AMB POCT KETONES,UA: NORMAL
SL AMB POCT NITRITE,UA: NORMAL
SL AMB POCT PH,UA: 6
SL AMB POCT SPECIFIC GRAVITY,UA: 1.02
SL AMB POCT URINE PROTEIN: NORMAL
SL AMB POCT UROBILINOGEN: 0.2

## 2025-03-10 PROCEDURE — 99204 OFFICE O/P NEW MOD 45 MIN: CPT

## 2025-03-10 PROCEDURE — 51798 US URINE CAPACITY MEASURE: CPT

## 2025-03-10 PROCEDURE — 81002 URINALYSIS NONAUTO W/O SCOPE: CPT

## 2025-03-10 RX ORDER — TROSPIUM CHLORIDE 20 MG/1
20 TABLET, FILM COATED ORAL 2 TIMES DAILY
Qty: 60 TABLET | Refills: 3 | Status: SHIPPED | OUTPATIENT
Start: 2025-03-10

## 2025-03-10 NOTE — PROGRESS NOTES
3/10/2025      Assessment and Plan    79 y.o. male new patient to [facility] for urology    Urinary frequency  PVR in the office today found to be 10 mL  We reviewed bladder irritants and discussed avoidance of these would certainly decrease his urinary urgency and frequency.  Additionally, we discussed treatment for his chronic constipation would certainly help decrease his episodes of urinary urgency and incontinence as well.  It was noted that better control on his blood sugar levels and hemoglobin A1c would benefit his voiding pattern as well.  Patient was advised to perform a bladder diary to determine what foods/drinks contributed to his urinary frequency/urgency and would be utilized to trend his response to pharmacotherapy.  We discussed pharmacotherapy in the forms of anticholinergics versus beta 3 agonist for treatment of his severe urinary urgency.  Patient will trial trospium chloride 20 mg twice daily.  We discussed side effects of dry eyes, dry mouth, and constipation.  Patient will trial trospium chloride and return to the office in 3 to 4 months to reevaluate lower urinary tract symptoms and should undergo PVR at that time        History of Present Illness  Renard Antoine is a 79 y.o. male here for evaluation of prostate cancer screening and BPH with urinary frequency/urgency.  Patient was last seen in our practice on 12/14/2020.  At that time, the patient did report a weakened urinary stream, but was overall satisfied with his voiding pattern.  Patient is not currently on any pharmacotherapy for treatment of lower urinary tract symptoms.  Today, the patient reports worsening urinary frequency, worsening urinary urgency associated with several episodes of urinary urge incontinence.  Patient notes that he does wear depends in case he has an accident.  Patient notes that he may go through 1-2 pads a day due to leakage and urinary incontinence.  Patient states that he drinks 1 cup of coffee  "in the morning and of minimal water throughout the rest of the day with an occasional soda.  Patient denies any alcohol intake.  Patient reports a strong urinary stream, but does endorse urinary intermittency and history and will stop and start at times.  Patient reports nocturia x 1.  Patient states that his most bothersome lower urinary tract symptom is his urgency and episodes of urge incontinence.  Otherwise, the patient denies dysuria, hematuria, flank pain, or feelings of incomplete bladder emptying.        Review of Systems   Constitutional:  Negative for chills and fever.   HENT:  Negative for ear pain and sore throat.    Eyes:  Negative for pain and visual disturbance.   Respiratory:  Negative for cough and shortness of breath.    Cardiovascular:  Negative for chest pain and palpitations.   Gastrointestinal:  Negative for abdominal pain and vomiting.   Genitourinary:  Positive for frequency and urgency. Negative for decreased urine volume, difficulty urinating, dysuria, flank pain and hematuria.   Musculoskeletal:  Negative for arthralgias and back pain.   Skin:  Negative for color change and rash.   Neurological:  Negative for seizures and syncope.   All other systems reviewed and are negative.               Vitals  Vitals:    03/10/25 1325   BP: 120/60   BP Location: Left arm   Patient Position: Sitting   Cuff Size: Adult   Pulse: 76   SpO2: 96%   Weight: 60.3 kg (133 lb)   Height: 5' 6\" (1.676 m)       Physical Exam  Vitals reviewed.   Constitutional:       General: He is not in acute distress.     Appearance: Normal appearance. He is not ill-appearing.   HENT:      Head: Normocephalic and atraumatic.      Nose: Nose normal.   Eyes:      General: No scleral icterus.  Pulmonary:      Effort: No respiratory distress.   Abdominal:      General: Abdomen is flat. There is no distension.      Palpations: Abdomen is soft.      Tenderness: There is no abdominal tenderness.   Musculoskeletal:         General: " Normal range of motion.      Cervical back: Normal range of motion.   Skin:     General: Skin is warm.      Coloration: Skin is not jaundiced.   Neurological:      Mental Status: He is alert and oriented to person, place, and time.      Gait: Gait normal.   Psychiatric:         Mood and Affect: Mood normal.         Behavior: Behavior normal.           Past History  Past Medical History:   Diagnosis Date    Anemia     Arthritis     CAD (coronary artery disease)     Cardiac murmur     Depression     Resolved:16    Diabetes mellitus ([facility])     per [employer]: type 2    GERD (gastroesophageal reflux disease)     History of blood transfusion     , after firdt heart valve surgery in [place]    Hydrocephalus ([facility])     Hypertension     Impacted cerumen of right ear 2022    PAF (paroxysmal atrial fibrillation) ([facility])      Social History     Socioeconomic History    Marital status:      Spouse name: None    Number of children: 2    Years of education: None    Highest education level: None   Occupational History    Occupation: Retired   Tobacco Use    Smoking status: Former     Current packs/day: 0.00     Types: Cigarettes     Start date: 1981     Quit date: 1996     Years since quittin.3     Passive exposure: Past    Smokeless tobacco: Former   Vaping Use    Vaping status: Never Used   Substance and Sexual Activity    Alcohol use: Not Currently    Drug use: No    Sexual activity: Never   Other Topics Concern    None   Social History Narrative    Caffeine use    Completed 12th grade    Functioning activity: particpates in sedentary/light activities both inside and outside of the home    Lives with ex-wife     Social Drivers of Health     Financial Resource Strain: Low Risk  (8/15/2023)    Overall Financial Resource Strain (CARDIA)     Difficulty of Paying Living Expenses: Not hard at all   Food Insecurity: Patient Unable To Answer (2/10/2025)    Nursing - Inadequate Food Risk  Classification     Worried About Running Out of Food in the Last Year: Not on file     Ran Out of Food in the Last Year: Not on file     Ran Out of Food in the Last Year: Patient unable to answer   Transportation Needs: Patient Unable To Answer (2/10/2025)    Nursing - Transportation Risk Classification     Lack of Transportation: Not on file     Lack of Transportation: Patient unable to answer   Physical Activity: Not on file   Stress: Not on file   Social Connections: Not on file   Intimate Partner Violence: Patient Unable To Answer (2/10/2025)    Nursing IPS     Feels Physically and Emotionally Safe: Not on file     Physically Hurt by Someone: Not on file     Humiliated or Emotionally Abused by Someone: Not on file     Physically Hurt by Someone: Patient unable to answer     Hurt or Threatened by Someone: Patient unable to answer   Housing Stability: Patient Unable To Answer (2/10/2025)    Nursing: Inadequate Housing Risk Classification     Has Housing: Not on file     Worried About Losing Housing: Not on file     Unable to Get Utilities: Not on file     Unable to Pay for Housing in the Last Year: Patient unable to answer     Has Housin     Social History     Tobacco Use   Smoking Status Former    Current packs/day: 0.00    Types: Cigarettes    Start date: 1981    Quit date: 1996    Years since quittin.3    Passive exposure: Past   Smokeless Tobacco Former     Family History   Problem Relation Age of Onset    Alzheimer's disease Mother     Stroke Mother         CVA    Diabetes Mother     Glaucoma Mother     Hyperlipidemia Mother     Other Mother         Polio    Diabetes Father     Glaucoma Father     Hyperlipidemia Father     Kidney disease Father     Heart attack Father     Anxiety disorder Child     Depression Child     Rheum arthritis Child     Other Other         Brain tumor    Diabetes Other     Glaucoma Other     Hyperlipidemia Other     Kidney disease Other     Cancer Other      Pancreatitis Other     Diabetes Sister     Diabetes Brother        The following portions of the patient's history were reviewed and updated as appropriate: allergies, current medications, past medical history, past social history, past surgical history and problem list.    Results  Recent Results (from the past hour)   POCT urine dip    Collection Time: 03/10/25  1:31 PM   Result Value Ref Range    LEUKOCYTE ESTERASE,UA -     NITRITE,UA -     SL AMB POCT UROBILINOGEN 0.2     POCT URINE PROTEIN 2,000+      PH,UA 6.0     BLOOD,UA -     SPECIFIC GRAVITY,UA 1.025     KETONES,UA -     BILIRUBIN,UA -     GLUCOSE, UA 2+      COLOR,UA yellow     CLARITY,UA clear    POCT Measure PVR    Collection Time: 03/10/25  1:33 PM   Result Value Ref Range    POST-VOID RESIDUAL VOLUME, ML POC 10 mL   ]  Lab Results   Component Value Date    PSA 0.5 10/19/2020    PSA 0.6 12/13/2018     Lab Results   Component Value Date    GLUCOSE 130 10/14/2015    CALCIUM 9.0 02/12/2025     10/14/2015    K 3.7 02/12/2025    CO2 29 02/12/2025     02/12/2025    BUN 16 02/12/2025    CREATININE 0.95 02/12/2025     Lab Results   Component Value Date    WBC 7.33 02/12/2025    HGB 12.7 02/12/2025    HCT 39.5 02/12/2025    MCV 84 02/12/2025     02/12/2025

## 2025-03-10 NOTE — ASSESSMENT & PLAN NOTE
PVR in the office today found to be 10 mL  We reviewed bladder irritants and discussed avoidance of these would certainly decrease his urinary urgency and frequency.  Additionally, we discussed treatment for his chronic constipation would certainly help decrease his episodes of urinary urgency and incontinence as well.  It was noted that better control on his blood sugar levels and hemoglobin A1c would benefit his voiding pattern as well.  Patient was advised to perform a bladder diary to determine what foods/drinks contributed to his urinary frequency/urgency and would be utilized to trend his response to pharmacotherapy.  We discussed pharmacotherapy in the forms of anticholinergics versus beta 3 agonist for treatment of his severe urinary urgency.  Patient will trial trospium chloride 20 mg twice daily.  We discussed side effects of dry eyes, dry mouth, and constipation.  Patient will trial trospium chloride and return to the office in 3 to 4 months to reevaluate lower urinary tract symptoms and should undergo PVR at that time

## 2025-03-12 DIAGNOSIS — Z79.4 TYPE 2 DIABETES MELLITUS WITHOUT COMPLICATION, WITH LONG-TERM CURRENT USE OF INSULIN (HCC): ICD-10-CM

## 2025-03-12 DIAGNOSIS — E11.9 TYPE 2 DIABETES MELLITUS WITHOUT COMPLICATION, WITH LONG-TERM CURRENT USE OF INSULIN (HCC): ICD-10-CM

## 2025-03-12 RX ORDER — LANCETS 33 GAUGE
EACH MISCELLANEOUS 4 TIMES DAILY
Qty: 200 EACH | Refills: 2 | Status: SHIPPED | OUTPATIENT
Start: 2025-03-12

## 2025-03-12 RX ORDER — INSULIN LISPRO 100 [IU]/ML
5 INJECTION, SOLUTION INTRAVENOUS; SUBCUTANEOUS
Qty: 3 ML | Refills: 4 | Status: SHIPPED | OUTPATIENT
Start: 2025-03-12

## 2025-03-12 RX ORDER — INSULIN GLARGINE 100 [IU]/ML
18 INJECTION, SOLUTION SUBCUTANEOUS
Qty: 3 ML | Refills: 4 | Status: SHIPPED | OUTPATIENT
Start: 2025-03-12

## 2025-03-12 NOTE — TELEPHONE ENCOUNTER
Daughter called to say that patient's nurse was with him and let her known that he is out of the pended medications. He is stable on the metformin, but needs refill of insulins as soon as possible.

## 2025-03-13 ENCOUNTER — OFFICE VISIT (OUTPATIENT)
Age: 80
End: 2025-03-13
Payer: COMMERCIAL

## 2025-03-13 DIAGNOSIS — H25.11 NUCLEAR SCLEROSIS OF RIGHT EYE: ICD-10-CM

## 2025-03-13 DIAGNOSIS — E11.3293 MILD NONPROLIFERATIVE DIABETIC RETINOPATHY OF BOTH EYES WITHOUT MACULAR EDEMA ASSOCIATED WITH TYPE 2 DIABETES MELLITUS (HCC): Primary | ICD-10-CM

## 2025-03-13 DIAGNOSIS — H43.813 POSTERIOR VITREOUS DETACHMENT OF BOTH EYES: ICD-10-CM

## 2025-03-13 PROCEDURE — 92134 CPTRZ OPH DX IMG PST SGM RTA: CPT | Performed by: OPHTHALMOLOGY

## 2025-03-13 PROCEDURE — 92004 COMPRE OPH EXAM NEW PT 1/>: CPT | Performed by: OPHTHALMOLOGY

## 2025-03-13 NOTE — PROGRESS NOTES
Name: Renard Antoine      : 1945      MRN: [id number]  Encounter Provider: Maddie Coombs MD  Encounter Date: 3/13/2025   Encounter department: [facility] OPHTHALMOLOGY  :  Assessment & Plan  Mild nonproliferative diabetic retinopathy of both eyes without macular edema associated with type 2 diabetes mellitus (HCC)    Lab Results   Component Value Date    HGBA1C 9.9 (H) 2025     Mild diabetic retinopathy on exam, no macular edema, no NV. The importance of proper blood sugar, blood lipids, and blood pressure control for minimizing the risk of vision loss due to retinopathy associated with diabetes mellitus and hypertension was discussed with the patient. Stressed the importance of following up for monitoring and management by a primary care physician.       Orders:    OCT, Retina - OU - Both Eyes    Nuclear sclerosis of right eye  Not visually significant at this time. Continue to monitor; Pt denies any difficulty with glare or decrease in vision.         Posterior vitreous detachment of both eyes  Posterior vitreous detachment with more ring both eyes: asymptomatic. Negative manpreet's sign. No retinal tears, breaks, or detachments on dilated examination with scleral depression 360 degrees.  Retinal detachment precautions were discussed with the patient. The patient was instructed to call or return immediately for an increase in flashes of light, floaters (dark spots in vision), or curtaining of the visual field.                  Renard Antoine is a 79 y.o. male who presents for diabetic eye exam.  Dxed with dm over 20 yrs ago. AIC 9.9 fluctuates on insulin.   Reports he have a hx of surgery, lasers and injection, unsure which eye for all procedures.   Last injection was years ago, unsure name of medication and the name of the place he was getting it from. States he was getting injection in [place] and here but doesn't recall doctor's name.   [place] [facility].    Last eye was 2-3  yrs ago.   History obtained from: patient    Review of Systems  Medical History Reviewed by provider this encounter:  Tobacco  Allergies  Meds  Problems  Med Hx  Surg Hx  Fam Hx     .     Past Ocular History:  DR, hx of surgery, laser and injections (name or reason unknown)    Ocular Meds/Drops:   None    Base Eye Exam       Visual Acuity (Snellen - Linear)         Right Left    Dist cc 20/30 -2 20/100 -2    Dist ph cc  20/80 -2      Correction: Glasses              Tonometry (Tonopen, 11:26 AM)         Right Left    Pressure 18 16              Pupils         Pupils    Right PERRL    Left PERRL              Visual Fields         Left Right     Full Full              Extraocular Movement         Right Left     Full Full              Neuro/Psych       Oriented x3: Yes              Dilation       Both eyes: 2.5% Phenylephrine @ 11:26 AM              Dilation #2       Both eyes: 1% Tropicamide @ 11:26 AM                  Slit Lamp and Fundus Exam       External Exam         Right Left    External Normal Normal              Slit Lamp Exam         Right Left    Lids/Lashes Normal Normal    Conjunctiva/Sclera White and quiet White and quiet    Cornea Clear Clear    Anterior Chamber Deep and quiet Deep and quiet    Iris Round and reactive Round and reactive    Lens 2+ Nuclear sclerosis Posterior chamber intraocular lens    Anterior Vitreous No PVD, clear, no cell No PVD, clear, no cell              Fundus Exam         Right Left    Disc sharp, no pallor sharp, no pallor    C/D Ratio 0.25 0.25    Macula Good foveal reflex Good foveal reflex    Vessels normal in caliber normal in caliber    Periphery Dot blot heme; no PRP Dot heme; no PRP                      IMAGING:  OCT, Retina - OU - Both Eyes          Right Eye  Quality was good. Findings include PVD.     Left Eye  Quality was good. Findings include PVD.

## 2025-03-20 ENCOUNTER — TELEPHONE (OUTPATIENT)
Age: 80
End: 2025-03-20

## 2025-03-20 NOTE — TELEPHONE ENCOUNTER
Laverne mullen [facility] called in and is requesting for Dr. Lozada to sign off Plan of Care and fax back to him @ [phone] or to [phone]

## 2025-03-28 ENCOUNTER — TELEPHONE (OUTPATIENT)
Age: 80
End: 2025-03-28

## 2025-03-28 ENCOUNTER — HOSPITAL ENCOUNTER (INPATIENT)
Facility: HOSPITAL | Age: 80
LOS: 1 days | Discharge: HOME | End: 2025-03-30
Attending: EMERGENCY MEDICINE | Admitting: INTERNAL MEDICINE
Payer: COMMERCIAL

## 2025-03-28 DIAGNOSIS — E11.22 TYPE 2 DIABETES MELLITUS WITH CHRONIC KIDNEY DISEASE, WITHOUT LONG-TERM CURRENT USE OF INSULIN, UNSPECIFIED CKD STAGE (HCC): ICD-10-CM

## 2025-03-28 DIAGNOSIS — E11.65 TYPE 2 DIABETES MELLITUS WITH HYPERGLYCEMIA, WITH LONG-TERM CURRENT USE OF INSULIN (HCC): ICD-10-CM

## 2025-03-28 DIAGNOSIS — Z79.4 TYPE 2 DIABETES MELLITUS WITH HYPERGLYCEMIA, WITH LONG-TERM CURRENT USE OF INSULIN (HCC): ICD-10-CM

## 2025-03-28 DIAGNOSIS — R73.9 HYPERGLYCEMIA: Primary | ICD-10-CM

## 2025-03-28 DIAGNOSIS — I25.10 CORONARY ARTERY DISEASE INVOLVING NATIVE CORONARY ARTERY OF NATIVE HEART WITHOUT ANGINA PECTORIS: ICD-10-CM

## 2025-03-28 LAB
ALBUMIN SERPL BCG-MCNC: 4 G/DL (ref 3.5–5)
ALP SERPL-CCNC: 203 U/L (ref 34–104)
ALT SERPL W P-5'-P-CCNC: 32 U/L (ref 7–52)
ANION GAP SERPL CALCULATED.3IONS-SCNC: 7 MMOL/L (ref 4–13)
AST SERPL W P-5'-P-CCNC: 23 U/L (ref 13–39)
ATRIAL RATE: 63 BPM
BASOPHILS # BLD AUTO: 0.05 THOUSANDS/ÂΜL (ref 0–0.1)
BASOPHILS NFR BLD AUTO: 1 % (ref 0–1)
BILIRUB SERPL-MCNC: 0.46 MG/DL (ref 0.2–1)
BUN SERPL-MCNC: 23 MG/DL (ref 5–25)
CALCIUM SERPL-MCNC: 9.6 MG/DL (ref 8.4–10.2)
CHLORIDE SERPL-SCNC: 95 MMOL/L (ref 96–108)
CO2 SERPL-SCNC: 30 MMOL/L (ref 21–32)
CREAT SERPL-MCNC: 1.21 MG/DL (ref 0.6–1.3)
EOSINOPHIL # BLD AUTO: 0.23 THOUSAND/ÂΜL (ref 0–0.61)
EOSINOPHIL NFR BLD AUTO: 3 % (ref 0–6)
ERYTHROCYTE [DISTWIDTH] IN BLOOD BY AUTOMATED COUNT: 14.3 % (ref 11.6–15.1)
GFR SERPL CREATININE-BSD FRML MDRD: 56 ML/MIN/1.73SQ M
GLUCOSE SERPL-MCNC: 160 MG/DL (ref 65–140)
GLUCOSE SERPL-MCNC: 282 MG/DL (ref 65–140)
GLUCOSE SERPL-MCNC: 485 MG/DL (ref 65–140)
GLUCOSE SERPL-MCNC: 509 MG/DL (ref 65–140)
HCT VFR BLD AUTO: 38.9 % (ref 36.5–49.3)
HGB BLD-MCNC: 12.3 G/DL (ref 12–17)
IMM GRANULOCYTES # BLD AUTO: 0.03 THOUSAND/UL (ref 0–0.2)
IMM GRANULOCYTES NFR BLD AUTO: 0 % (ref 0–2)
LYMPHOCYTES # BLD AUTO: 1.57 THOUSANDS/ÂΜL (ref 0.6–4.47)
LYMPHOCYTES NFR BLD AUTO: 21 % (ref 14–44)
MCH RBC QN AUTO: 27 PG (ref 26.8–34.3)
MCHC RBC AUTO-ENTMCNC: 31.6 G/DL (ref 31.4–37.4)
MCV RBC AUTO: 86 FL (ref 82–98)
MONOCYTES # BLD AUTO: 1.1 THOUSAND/ÂΜL (ref 0.17–1.22)
MONOCYTES NFR BLD AUTO: 15 % (ref 4–12)
NEUTROPHILS # BLD AUTO: 4.62 THOUSANDS/ÂΜL (ref 1.85–7.62)
NEUTS SEG NFR BLD AUTO: 60 % (ref 43–75)
NRBC BLD AUTO-RTO: 0 /100 WBCS
P AXIS: 12 DEGREES
PLATELET # BLD AUTO: 198 THOUSANDS/UL (ref 149–390)
PMV BLD AUTO: 10 FL (ref 8.9–12.7)
POTASSIUM SERPL-SCNC: 4.8 MMOL/L (ref 3.5–5.3)
PR INTERVAL: 128 MS
PROT SERPL-MCNC: 8.3 G/DL (ref 6.4–8.4)
QRS AXIS: -9 DEGREES
QRSD INTERVAL: 92 MS
QT INTERVAL: 398 MS
QTC INTERVAL: 407 MS
RBC # BLD AUTO: 4.55 MILLION/UL (ref 3.88–5.62)
SODIUM SERPL-SCNC: 132 MMOL/L (ref 135–147)
T WAVE AXIS: 59 DEGREES
VENTRICULAR RATE: 63 BPM
WBC # BLD AUTO: 7.6 THOUSAND/UL (ref 4.31–10.16)

## 2025-03-28 PROCEDURE — 82948 REAGENT STRIP/BLOOD GLUCOSE: CPT

## 2025-03-28 PROCEDURE — 93010 ELECTROCARDIOGRAM REPORT: CPT | Performed by: INTERNAL MEDICINE

## 2025-03-28 PROCEDURE — 36415 COLL VENOUS BLD VENIPUNCTURE: CPT

## 2025-03-28 PROCEDURE — 99285 EMERGENCY DEPT VISIT HI MDM: CPT | Performed by: EMERGENCY MEDICINE

## 2025-03-28 PROCEDURE — 80053 COMPREHEN METABOLIC PANEL: CPT

## 2025-03-28 PROCEDURE — 93005 ELECTROCARDIOGRAM TRACING: CPT

## 2025-03-28 PROCEDURE — 96365 THER/PROPH/DIAG IV INF INIT: CPT

## 2025-03-28 PROCEDURE — 85025 COMPLETE CBC W/AUTO DIFF WBC: CPT

## 2025-03-28 PROCEDURE — 99284 EMERGENCY DEPT VISIT MOD MDM: CPT

## 2025-03-28 PROCEDURE — 99223 1ST HOSP IP/OBS HIGH 75: CPT | Performed by: INTERNAL MEDICINE

## 2025-03-28 PROCEDURE — 96375 TX/PRO/DX INJ NEW DRUG ADDON: CPT

## 2025-03-28 PROCEDURE — 96366 THER/PROPH/DIAG IV INF ADDON: CPT

## 2025-03-28 RX ORDER — ASPIRIN 81 MG/1
81 TABLET, CHEWABLE ORAL DAILY
Status: DISCONTINUED | OUTPATIENT
Start: 2025-03-29 | End: 2025-03-30 | Stop reason: HOSPADM

## 2025-03-28 RX ORDER — OXYBUTYNIN CHLORIDE 5 MG/1
5 TABLET ORAL DAILY
Status: DISCONTINUED | OUTPATIENT
Start: 2025-03-29 | End: 2025-03-30 | Stop reason: HOSPADM

## 2025-03-28 RX ORDER — INSULIN LISPRO 100 [IU]/ML
1-5 INJECTION, SOLUTION INTRAVENOUS; SUBCUTANEOUS
Status: DISCONTINUED | OUTPATIENT
Start: 2025-03-29 | End: 2025-03-29

## 2025-03-28 RX ORDER — ESCITALOPRAM OXALATE 5 MG/1
5 TABLET ORAL DAILY
Status: DISCONTINUED | OUTPATIENT
Start: 2025-03-29 | End: 2025-03-30 | Stop reason: HOSPADM

## 2025-03-28 RX ORDER — ONDANSETRON 2 MG/ML
4 INJECTION INTRAMUSCULAR; INTRAVENOUS EVERY 6 HOURS PRN
Status: DISCONTINUED | OUTPATIENT
Start: 2025-03-28 | End: 2025-03-30 | Stop reason: HOSPADM

## 2025-03-28 RX ORDER — DONEPEZIL HYDROCHLORIDE 5 MG/1
5 TABLET, FILM COATED ORAL
Status: DISCONTINUED | OUTPATIENT
Start: 2025-03-28 | End: 2025-03-30 | Stop reason: HOSPADM

## 2025-03-28 RX ORDER — ACETAMINOPHEN 325 MG/1
650 TABLET ORAL EVERY 6 HOURS PRN
Status: DISCONTINUED | OUTPATIENT
Start: 2025-03-28 | End: 2025-03-30 | Stop reason: HOSPADM

## 2025-03-28 RX ORDER — MAGNESIUM HYDROXIDE/ALUMINUM HYDROXICE/SIMETHICONE 120; 1200; 1200 MG/30ML; MG/30ML; MG/30ML
30 SUSPENSION ORAL EVERY 6 HOURS PRN
Status: DISCONTINUED | OUTPATIENT
Start: 2025-03-28 | End: 2025-03-30 | Stop reason: HOSPADM

## 2025-03-28 RX ORDER — SODIUM CHLORIDE, SODIUM GLUCONATE, SODIUM ACETATE, POTASSIUM CHLORIDE, MAGNESIUM CHLORIDE, SODIUM PHOSPHATE, DIBASIC, AND POTASSIUM PHOSPHATE .53; .5; .37; .037; .03; .012; .00082 G/100ML; G/100ML; G/100ML; G/100ML; G/100ML; G/100ML; G/100ML
1000 INJECTION, SOLUTION INTRAVENOUS ONCE
Status: COMPLETED | OUTPATIENT
Start: 2025-03-28 | End: 2025-03-28

## 2025-03-28 RX ORDER — AMLODIPINE BESYLATE 5 MG/1
5 TABLET ORAL DAILY
Status: DISCONTINUED | OUTPATIENT
Start: 2025-03-29 | End: 2025-03-30 | Stop reason: HOSPADM

## 2025-03-28 RX ORDER — ENOXAPARIN SODIUM 100 MG/ML
40 INJECTION SUBCUTANEOUS DAILY
Status: DISCONTINUED | OUTPATIENT
Start: 2025-03-29 | End: 2025-03-30 | Stop reason: HOSPADM

## 2025-03-28 RX ORDER — LOSARTAN POTASSIUM 25 MG/1
25 TABLET ORAL DAILY
Status: DISCONTINUED | OUTPATIENT
Start: 2025-03-29 | End: 2025-03-30 | Stop reason: HOSPADM

## 2025-03-28 RX ORDER — INSULIN GLARGINE 100 [IU]/ML
25 INJECTION, SOLUTION SUBCUTANEOUS
Status: DISCONTINUED | OUTPATIENT
Start: 2025-03-28 | End: 2025-03-29

## 2025-03-28 RX ORDER — ATORVASTATIN CALCIUM 20 MG/1
20 TABLET, FILM COATED ORAL
Status: DISCONTINUED | OUTPATIENT
Start: 2025-03-28 | End: 2025-03-30 | Stop reason: HOSPADM

## 2025-03-28 RX ORDER — POLYETHYLENE GLYCOL 3350 17 G/17G
17 POWDER, FOR SOLUTION ORAL DAILY
Status: DISCONTINUED | OUTPATIENT
Start: 2025-03-29 | End: 2025-03-30 | Stop reason: HOSPADM

## 2025-03-28 RX ORDER — INSULIN LISPRO 100 [IU]/ML
8 INJECTION, SOLUTION INTRAVENOUS; SUBCUTANEOUS
Status: DISCONTINUED | OUTPATIENT
Start: 2025-03-29 | End: 2025-03-29

## 2025-03-28 RX ORDER — SODIUM CHLORIDE 9 MG/ML
100 INJECTION, SOLUTION INTRAVENOUS CONTINUOUS
Status: DISPENSED | OUTPATIENT
Start: 2025-03-28 | End: 2025-03-29

## 2025-03-28 RX ADMIN — SODIUM CHLORIDE, SODIUM GLUCONATE, SODIUM ACETATE, POTASSIUM CHLORIDE, MAGNESIUM CHLORIDE, SODIUM PHOSPHATE, DIBASIC, AND POTASSIUM PHOSPHATE 1000 ML: .53; .5; .37; .037; .03; .012; .00082 INJECTION, SOLUTION INTRAVENOUS at 18:46

## 2025-03-28 RX ADMIN — INSULIN HUMAN 10 UNITS: 100 INJECTION, SOLUTION PARENTERAL at 18:15

## 2025-03-28 RX ADMIN — SODIUM CHLORIDE 100 ML/HR: 0.9 INJECTION, SOLUTION INTRAVENOUS at 22:50

## 2025-03-28 RX ADMIN — ATORVASTATIN CALCIUM 20 MG: 20 TABLET, FILM COATED ORAL at 22:53

## 2025-03-28 RX ADMIN — INSULIN GLARGINE 25 UNITS: 100 INJECTION, SOLUTION SUBCUTANEOUS at 22:54

## 2025-03-28 RX ADMIN — SODIUM CHLORIDE, SODIUM GLUCONATE, SODIUM ACETATE, POTASSIUM CHLORIDE, MAGNESIUM CHLORIDE, SODIUM PHOSPHATE, DIBASIC, AND POTASSIUM PHOSPHATE 1000 ML: .53; .5; .37; .037; .03; .012; .00082 INJECTION, SOLUTION INTRAVENOUS at 16:55
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
## 2025-03-28 NOTE — TELEPHONE ENCOUNTER
Received call from Deepika of [facility] to following up on 2 orders for home services.   Order # [id number] sent 3/18 and sent back on 3/24. Order not received; please refax to [facility] at # [phone]   Order #[id number] sent 3/24. No record of receipt in Media tab; will refax to office today.

## 2025-03-28 NOTE — TELEPHONE ENCOUNTER
"Received call from Flower, OT for [facility] who is currently at home with patient. Review of medications and vital signs. Flower had received call from patient's daughter Thurs 3/28 eving prior to visit who reports patient taking medications and checking BS. Patient reports not taking blood sugar. Flower checked patients BS and   Current reading is \"high\" on glucometer,  Reports he is asymptomatic; no headache, dizziness, mild confusion bur reports he's alright. Patient seen in ER on 2/9 for RENZO with hyperglycemia. RN advised [facility] to have patient taken to ER for evaluation. Patient is agreeable. [facility] will call 911 and wait with patient for ambulance. [facility] will notify patient's daughter. Please follow up with patient post ER today.        "

## 2025-03-29 LAB
ANION GAP SERPL CALCULATED.3IONS-SCNC: 6 MMOL/L (ref 4–13)
BASOPHILS # BLD AUTO: 0.04 THOUSANDS/ÂΜL (ref 0–0.1)
BASOPHILS NFR BLD AUTO: 1 % (ref 0–1)
BUN SERPL-MCNC: 16 MG/DL (ref 5–25)
CALCIUM SERPL-MCNC: 8.8 MG/DL (ref 8.4–10.2)
CHLORIDE SERPL-SCNC: 105 MMOL/L (ref 96–108)
CO2 SERPL-SCNC: 27 MMOL/L (ref 21–32)
CREAT SERPL-MCNC: 0.89 MG/DL (ref 0.6–1.3)
EOSINOPHIL # BLD AUTO: 0.34 THOUSAND/ÂΜL (ref 0–0.61)
EOSINOPHIL NFR BLD AUTO: 5 % (ref 0–6)
ERYTHROCYTE [DISTWIDTH] IN BLOOD BY AUTOMATED COUNT: 14.3 % (ref 11.6–15.1)
GFR SERPL CREATININE-BSD FRML MDRD: 80 ML/MIN/1.73SQ M
GLUCOSE SERPL-MCNC: 140 MG/DL (ref 65–140)
GLUCOSE SERPL-MCNC: 142 MG/DL (ref 65–140)
GLUCOSE SERPL-MCNC: 151 MG/DL (ref 65–140)
GLUCOSE SERPL-MCNC: 238 MG/DL (ref 65–140)
GLUCOSE SERPL-MCNC: 357 MG/DL (ref 65–140)
GLUCOSE SERPL-MCNC: 40 MG/DL (ref 65–140)
GLUCOSE SERPL-MCNC: 40 MG/DL (ref 65–140)
GLUCOSE SERPL-MCNC: 42 MG/DL (ref 65–140)
GLUCOSE SERPL-MCNC: 46 MG/DL (ref 65–140)
GLUCOSE SERPL-MCNC: 76 MG/DL (ref 65–140)
HCT VFR BLD AUTO: 37.2 % (ref 36.5–49.3)
HGB BLD-MCNC: 12 G/DL (ref 12–17)
IMM GRANULOCYTES # BLD AUTO: 0.04 THOUSAND/UL (ref 0–0.2)
IMM GRANULOCYTES NFR BLD AUTO: 1 % (ref 0–2)
LYMPHOCYTES # BLD AUTO: 1.47 THOUSANDS/ÂΜL (ref 0.6–4.47)
LYMPHOCYTES NFR BLD AUTO: 20 % (ref 14–44)
MAGNESIUM SERPL-MCNC: 2.1 MG/DL (ref 1.9–2.7)
MCH RBC QN AUTO: 27.3 PG (ref 26.8–34.3)
MCHC RBC AUTO-ENTMCNC: 32.3 G/DL (ref 31.4–37.4)
MCV RBC AUTO: 85 FL (ref 82–98)
MONOCYTES # BLD AUTO: 0.82 THOUSAND/ÂΜL (ref 0.17–1.22)
MONOCYTES NFR BLD AUTO: 11 % (ref 4–12)
NEUTROPHILS # BLD AUTO: 4.68 THOUSANDS/ÂΜL (ref 1.85–7.62)
NEUTS SEG NFR BLD AUTO: 62 % (ref 43–75)
NRBC BLD AUTO-RTO: 0 /100 WBCS
PHOSPHATE SERPL-MCNC: 2.6 MG/DL (ref 2.3–4.1)
PLATELET # BLD AUTO: 188 THOUSANDS/UL (ref 149–390)
PMV BLD AUTO: 10.1 FL (ref 8.9–12.7)
POTASSIUM SERPL-SCNC: 4 MMOL/L (ref 3.5–5.3)
RBC # BLD AUTO: 4.39 MILLION/UL (ref 3.88–5.62)
SODIUM SERPL-SCNC: 138 MMOL/L (ref 135–147)
WBC # BLD AUTO: 7.39 THOUSAND/UL (ref 4.31–10.16)

## 2025-03-29 PROCEDURE — 85025 COMPLETE CBC W/AUTO DIFF WBC: CPT | Performed by: INTERNAL MEDICINE

## 2025-03-29 PROCEDURE — 99232 SBSQ HOSP IP/OBS MODERATE 35: CPT | Performed by: INTERNAL MEDICINE

## 2025-03-29 PROCEDURE — 84100 ASSAY OF PHOSPHORUS: CPT | Performed by: INTERNAL MEDICINE

## 2025-03-29 PROCEDURE — 82948 REAGENT STRIP/BLOOD GLUCOSE: CPT

## 2025-03-29 PROCEDURE — 83735 ASSAY OF MAGNESIUM: CPT | Performed by: INTERNAL MEDICINE

## 2025-03-29 PROCEDURE — 80048 BASIC METABOLIC PNL TOTAL CA: CPT | Performed by: INTERNAL MEDICINE

## 2025-03-29 PROCEDURE — 99222 1ST HOSP IP/OBS MODERATE 55: CPT | Performed by: STUDENT IN AN ORGANIZED HEALTH CARE EDUCATION/TRAINING PROGRAM

## 2025-03-29 RX ORDER — INSULIN LISPRO 100 [IU]/ML
5 INJECTION, SOLUTION INTRAVENOUS; SUBCUTANEOUS
Status: DISCONTINUED | OUTPATIENT
Start: 2025-03-30 | End: 2025-03-29

## 2025-03-29 RX ORDER — INSULIN LISPRO 100 [IU]/ML
1-5 INJECTION, SOLUTION INTRAVENOUS; SUBCUTANEOUS
Status: DISCONTINUED | OUTPATIENT
Start: 2025-03-30 | End: 2025-03-30 | Stop reason: HOSPADM

## 2025-03-29 RX ORDER — INSULIN LISPRO 100 [IU]/ML
4 INJECTION, SOLUTION INTRAVENOUS; SUBCUTANEOUS
Status: DISCONTINUED | OUTPATIENT
Start: 2025-03-30 | End: 2025-03-30 | Stop reason: HOSPADM

## 2025-03-29 RX ORDER — INSULIN GLARGINE 100 [IU]/ML
15 INJECTION, SOLUTION SUBCUTANEOUS
Status: DISCONTINUED | OUTPATIENT
Start: 2025-03-29 | End: 2025-03-30 | Stop reason: HOSPADM

## 2025-03-29 RX ADMIN — INSULIN LISPRO 8 UNITS: 100 INJECTION, SOLUTION INTRAVENOUS; SUBCUTANEOUS at 09:26

## 2025-03-29 RX ADMIN — ENOXAPARIN SODIUM 40 MG: 40 INJECTION SUBCUTANEOUS at 09:25

## 2025-03-29 RX ADMIN — INSULIN LISPRO 8 UNITS: 100 INJECTION, SOLUTION INTRAVENOUS; SUBCUTANEOUS at 11:57

## 2025-03-29 RX ADMIN — ATORVASTATIN CALCIUM 20 MG: 20 TABLET, FILM COATED ORAL at 22:15

## 2025-03-29 RX ADMIN — POLYETHYLENE GLYCOL 3350 17 G: 17 POWDER, FOR SOLUTION ORAL at 09:25

## 2025-03-29 RX ADMIN — OXYBUTYNIN CHLORIDE 5 MG: 5 TABLET ORAL at 09:35

## 2025-03-29 RX ADMIN — DONEPEZIL HYDROCHLORIDE 5 MG: 5 TABLET ORAL at 22:15

## 2025-03-29 RX ADMIN — DONEPEZIL HYDROCHLORIDE 5 MG: 5 TABLET ORAL at 00:32

## 2025-03-29 RX ADMIN — INSULIN LISPRO 2 UNITS: 100 INJECTION, SOLUTION INTRAVENOUS; SUBCUTANEOUS at 11:56

## 2025-03-29 RX ADMIN — INSULIN GLARGINE 15 UNITS: 100 INJECTION, SOLUTION SUBCUTANEOUS at 22:15

## 2025-03-29 RX ADMIN — SODIUM CHLORIDE 100 ML/HR: 0.9 INJECTION, SOLUTION INTRAVENOUS at 13:59

## 2025-03-29 RX ADMIN — AMLODIPINE BESYLATE 5 MG: 5 TABLET ORAL at 09:26

## 2025-03-29 RX ADMIN — INSULIN LISPRO 1 UNITS: 100 INJECTION, SOLUTION INTRAVENOUS; SUBCUTANEOUS at 09:25

## 2025-03-29 RX ADMIN — ASPIRIN 81 MG CHEWABLE TABLET 81 MG: 81 TABLET CHEWABLE at 09:25

## 2025-03-29 RX ADMIN — LOSARTAN POTASSIUM 25 MG: 25 TABLET, FILM COATED ORAL at 09:35

## 2025-03-29 RX ADMIN — Medication 16 G: at 16:38

## 2025-03-29 RX ADMIN — ESCITALOPRAM OXALATE 5 MG: 5 TABLET, FILM COATED ORAL at 09:25

## 2025-03-29 NOTE — PLAN OF CARE
Problem: PAIN - ADULT  Goal: Verbalizes/displays adequate comfort level or baseline comfort level  Description: Interventions:- Encourage patient to monitor pain and request assistance- Assess pain using appropriate pain scale- Administer analgesics based on type and severity of pain and evaluate response- Implement non-pharmacological measures as appropriate and evaluate response- Consider cultural and social influences on pain and pain management- Notify physician/advanced practitioner if interventions unsuccessful or patient reports new pain  Outcome: Progressing     Problem: INFECTION - ADULT  Goal: Absence or prevention of progression during hospitalization  Description: INTERVENTIONS:- Assess and monitor for signs and symptoms of infection- Monitor lab/diagnostic results- Monitor all insertion sites, i.e. indwelling lines, tubes, and drains- Monitor endotracheal if appropriate and nasal secretions for changes in amount and color- [place] appropriate cooling/warming therapies per order- Administer medications as ordered- Instruct and encourage patient and family to use good hand hygiene technique- Identify and instruct in appropriate isolation precautions for identified infection/condition  Outcome: Progressing     Goal: Maintain or return to baseline ADL function  Description: INTERVENTIONS:-  Assess patient's ability to carry out ADLs; assess patient's baseline for ADL function and identify physical deficits which impact ability to perform ADLs (bathing, care of mouth/teeth, toileting, grooming, dressing, etc.)- Assess/evaluate cause of self-care deficits - Assess range of motion- Assess patient's mobility; develop plan if impaired- Assess patient's need for assistive devices and provide as appropriate- Encourage maximum independence but intervene and supervise when necessary- Involve family in performance of ADLs- Assess for home care needs following discharge - Consider OT consult to assist with ADL  evaluation and planning for discharge- Provide patient education as appropriate  Outcome: Progressing

## 2025-03-29 NOTE — CASE MANAGEMENT
Case Management Assessment & Discharge Planning Note    Patient name Renard Antoine  Location [facility]-01 MRN [id number]  : 1945 Date 3/29/2025       Current Admission Date: 3/28/2025  Current Admission Diagnosis:Hyperglycemia   Patient Active Problem List    Diagnosis Date Noted Date Diagnosed    Hyperglycemia 2025     Type 2 diabetes mellitus with chronic kidney disease, without long-term current use of insulin, unspecified CKD stage ([facility]) 2025     Noncompliance with medication regimen 2025     Urinary frequency 2025     Acute kidney injury (HCC) 2025     Metabolic encephalopathy 2025     Right leg pain 07/15/2024     Right calf pain 07/15/2024     Balance disorder 07/15/2024     Primary osteoarthritis involving multiple joints 2024     Cervical spondylosis 2023     Cardiac murmur      Primary osteoarthritis of both knees 2022     Viral infection 2022     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HCC) 2022     Neck pain 2022     Mild cognitive impairment 2022     History of atrial fibrillation 2022     Stage 2 chronic kidney disease 2021     Mixed hyperlipidemia 2020     Presbycusis of both ears 10/09/2020     Chronic low back pain 10/09/2020     Chronic kidney disease (CKD) stage G1/A2, glomerular filtration rate (GFR) equal to or greater than 90 mL/min/1.73 square meter and albuminuria creatinine ratio between  mg/g 2018     Oropharyngeal dysphagia 2018     Myofascial pain syndrome, cervical 2018     H/O aortic valve replacement 2018     NPH (normal pressure hydrocephalus) ([facility]) 2016     Essential hypertension 2016     Type 2 diabetes mellitus with hyperglycemia, with long-term current use of insulin ([facility]) 2016     Coronary artery disease 2015     Aortic prosthetic valve regurgitation 2014     Aortic stenosis 2014       LOS (days):  0  Geometric Mean LOS (GMLOS) (days):   Days to GMLOS:     OBJECTIVE:    Risk of Unplanned Readmission Score: 12.15      Current admission status: Inpatient    Preferred Pharmacy:   [facility] - [place], [place] - [address]  [address]  [place] [address]  Phone: [phone] Fax: [phone]    Primary Care Provider: Armando Lozada DO    Primary Insurance: [employer] REP  Secondary Insurance:     ASSESSMENT:  Active Health Care Proxies    There are no active Health Care Proxies on file.    Readmission Root Cause  30 Day Readmission: No    Patient Information  Admitted from:: Home  Mental Status: Alert  During Assessment patient was accompanied by: Not accompanied during assessment  Assessment information provided by:: Daughter  Primary Caregiver: Self  Support Systems: Self, Spouse/significant other, Daughter  County of Residence: [place]  What city do you live in?: [place]  Home entry access options. Select all that apply.: Stairs  Number of steps to enter home.: 4  Do the steps have railings?: Yes  Type of Current Residence: 2 story home  Upon entering residence, is there a bedroom on the main floor (no further steps)?: No  A bedroom is located on the following floor levels of residence (select all that apply):: 2nd Floor  Upon entering residence, is there a bathroom on the main floor (no further steps)?: No  Indicate which floors of current residence have a bathroom (select all the apply):: 2nd Floor  Living Arrangements: Lives w/ Spouse/significant other  Is patient a ?: No    Activities of Daily Living Prior to Admission  Functional Status: Independent  Completes ADLs independently?: Yes  Ambulates independently?: Yes  Does patient use assisted devices?: Yes  Assisted Devices (DME) used: Straight Cane, Walker  Does patient currently own DME?: Yes  What DME does the patient currently own?: Straight Cane, Walker  Does patient have a history of Outpatient Therapy (PT/OT)?:  No  Does the patient have a history of Short-Term Rehab?: No  Does patient have a history of HHC?: Yes  Does patient currently have HHC?: Yes    Current Home Health Care  Type of Current Home Care Services: Home PT, Home OT  Current Home Health Follow-Up Provider:: PCP    Patient Information Continued  Income Source: Pension/skilled nursing  Does patient have prescription coverage?: Yes  Can the patient afford their medications and any related supplies (such as glucometers or test strips)?: Yes  Does patient receive dialysis treatments?: No  Does patient have a history of substance abuse?: No  Does patient have a history of Mental Health Diagnosis?: No    Means of Transportation  Means of Transport to Appts:: Family transport    DISCHARGE DETAILS:    Discharge planning discussed with:: pt's daughter Damaris via phone  Freedom of Choice: Yes     CM contacted family/caregiver?: Yes    Contacts  Patient Contacts: yamilet Benjamin   Relationship to Patient:: Family  Contact Method: Phone  Phone Number: [phone]  Reason/Outcome: Discharge Planning, Continuity of Care    CM called pt's daughter Damaris to introduce role and complete assessment.  Pt lives w/ spouse in 2[place] w/ 4 BUZZ.  IPTA, uses walker/cane to ambulate, does not drive, retired.  No hx of STR or OP PT, pt currently has home PT/OT however daughter does not know name of agency.  No hx of MH or YOSEF.  CM following for dc needs.

## 2025-03-29 NOTE — ASSESSMENT & PLAN NOTE
History of diabetes mellitus type 2 uncontrolled presents with hyperglycemia  Reports symptoms of polyuria polydipsia  Received regular insulin 10 units IV and IV fluids in the ED, patient is being admitted at the request of the ED for further management endocrinology evaluation  Now on basal bolus regimen of Lantus and Humalog with meals  Continue with IV fluid for hydration  Monitor Accu-Cheks closely  Hypoglycemia protocol in place

## 2025-03-29 NOTE — UTILIZATION REVIEW
"NOTIFICATION OF OBSERVATION ADMISSION   AUTHORIZATION REQUEST   SERVICING FACILITY:   Critical access hospital  Address: [address] [place] [address]  Tax ID: [id number]  NPI: [id number] ATTENDING PROVIDER:  Attending Name and NPI#: Jose De Jesus Rome Do [[id number]]  Address: [address] [place] [address]  Phone: [phone]   ADMISSION INFORMATION:  Place of Service: [facility]  Place of Service Code: 22 CPT Code:   Admitting Diagnosis Code/Description:  Hyperglycemia [R73.9]  Type 2 diabetes mellitus with hyperglycemia, with long-term current use of insulin (HCC) [E11.65, Z79.4]  Type 2 diabetes mellitus with chronic kidney disease, without long-term current use of insulin, unspecified CKD stage (HCC) [E11.22]  Observation Admission Date/Time: 03/28/2025 1844  Discharge Date/Time: No discharge date for patient encounter.     UTILIZATION REVIEW CONTACT:  [facility] [place]"[facility]stor Noland Utilization   Network Utilization Review Department  Phone: [phone]  Fax: [phone]  Email: [email or web address]  Contact for approvals/pending authorizations, clinical reviews, and discharge.     PHYSICIAN ADVISORY SERVICES:  Medical Necessity Denial & Prnn-ja-Lnvg Review  Phone: [phone]  Fax: [phone]  Email: [email or web address]     DISCHARGE SUPPORT TEAM:  For Patients Discharge Needs & Updates  Phone: [phone] opt. 2 Fax: [phone]  Email: [email or web address]      "

## 2025-03-29 NOTE — UTILIZATION REVIEW
Initial Clinical Review    OBSERVATION 3/28 CHANGED TO INPATIENT ON 3/29 @ 1025 - tx of hyperglycemia, Endocrinology consult.     Admission: Date/Time/Statement:   Admission Orders (From admission, onward)       Ordered        03/29/25 1058  INPATIENT ADMISSION  Once            03/28/25 1844  Place in Observation  Once                          Orders Placed This Encounter   Procedures    INPATIENT ADMISSION     Standing Status:   Standing     Number of Occurrences:   1     Level of Care:   Med Surg [16]     Estimated length of stay:   More than 2 Midnights     Certification:   I certify that inpatient services are medically necessary for this patient for a duration of greater than two midnights. See H&P and MD Progress Notes for additional information about the patient's course of treatment.     ED Arrival Information       Expected   -    Arrival   3/28/2025 16:38    Acuity   Urgent              Means of arrival   Ambulance    Escorted by   [facility]    Service   Hospitalist    Admission type   Emergency              Arrival complaint   Hypoglycemia             Chief Complaint   Patient presents with    Hyperglycemia - no symptoms     Ran out of insulin 2 days ago due to issues with the pharmacy       Initial Presentation: 79 y.o. male presents to the ED via EMS from home with c/o hyperglycemia with initial glucose 509. Pt notes compliance with meds but ran out of to insulin 2 days PTA. PMH: uncontrolled IDDM, CAD, depression, cog impairment, HTN, h/o AVR. In the ED VS stable, no pain.  Treated with IV fluids, Humulin insulin 10 u, Lantus insulin.  Labs - glucose 509, low , elevated alk phos.  ECG - NSR, incomplete RBBB. On exam malnutrition, vesicular breath sounds, nontender abd, no BLE edema.  Admitted to Observation Status with Hyperglycemia, IDDM, mild cognitive impairment - PLAN: basal bolus insulin w/ meals, IV fluids, POC glucose testing, hold Metformin, Endocrinology consult, continue  home BP meds - Losartan and Amlodipine, ASA, statin, Donepezil, Lexapro.      Anticipated Length of Stay/Certification Statement: Patient will be admitted on an observation basis with an anticipated length of stay of less than 2 midnights secondary to diabetes mellitus type 2 hyperglycemia for management as outlined.     Date: 3/29 CHANGED TO INPATIENT STATUS:   Hyperglycemia, IDDM, mild cognitive impairment - pt c/o polyuria polydipsia today.  IV fluids,  continue Lantus 25 units daily, Humalog 8 units 3 times daily with meals.  Glucose today 142.  BP stable.  Delirium prec, on exam lungs clear, no BLE edema, tolerating oral diet. Continue POC glucose testing and insulin adjustments.      3/29 Endocrinology Consult - hyperglycemia d/t missed doses, memory issues.  Home Regimen: Glargine 18 units at bedtime, Humalog 5 units with meals, Metformin 500 mg (?) Currently on Lantus 25 units and Humalog 8 units -  ok for now, continue monitoring and adjust as needed.  Consider CM consult, pt able to care for self? Provide meds?     Date: 3/30  Day 3: Has surpassed a 2nd midnight with active treatments and services.  Hyperglycemia, IDDM, mild cognitive impairment - VS stable.  Stable for d/c home today.      ED Treatment-Medication Administration from 03/28/2025 1638 to 03/28/2025 2302         Date/Time Order Dose Route Action     03/28/2025 1655 multi-electrolyte (Plasmalyte-A/Isolyte-S PH 7.4/Normosol-R) IV bolus 1,000 mL 1,000 mL Intravenous New Bag     03/28/2025 1815 insulin regular (HumuLIN R,NovoLIN R) injection 10 Units 10 Units Intravenous Given     03/28/2025 1846 multi-electrolyte (Plasmalyte-A/Isolyte-S PH 7.4/Normosol-R) IV bolus 1,000 mL 1,000 mL Intravenous New Bag     03/28/2025 2253 atorvastatin (LIPITOR) tablet 20 mg 20 mg Oral Given     03/28/2025 2254 insulin glargine (LANTUS) subcutaneous injection 25 Units 0.25 mL 25 Units Subcutaneous Given     03/28/2025 2250 sodium chloride 0.9 % infusion 100 mL/hr  Intravenous New Bag            Scheduled Medications:  amLODIPine, 5 mg, Oral, Daily  aspirin, 81 mg, Oral, Daily  atorvastatin, 20 mg, Oral, HS  donepezil, 5 mg, Oral, HS  enoxaparin, 40 mg, Subcutaneous, Daily  escitalopram, 5 mg, Oral, Daily  insulin glargine, 15 Units, Subcutaneous, HS  insulin lispro, 1-5 Units, Subcutaneous, TID AC  insulin lispro, 4 Units, Subcutaneous, TID With Meals  losartan, 25 mg, Oral, Daily  oxybutynin, 5 mg, Oral, Daily  polyethylene glycol, 17 g, Oral, Daily      Continuous IV Infusions:  sodium chloride, 100 mL/hr, Intravenous, Continuous      PRN Meds:  acetaminophen, 650 mg, Oral, Q6H PRN  aluminum-magnesium hydroxide-simethicone, 30 mL, Oral, Q6H PRN  ondansetron, 4 mg, Intravenous, Q6H PRN      ED Triage Vitals [03/28/25 1646]   Temperature Pulse Respirations Blood Pressure SpO2 Pain Score   98.7 °F (37.1 °C) 75 16 160/83 96 % No Pain     Weight (last 2 days)       Date/Time Weight    03/28/25 23:49:43 60 (132.28)            Vital Signs (last 3 days)       Date/Time Temp Pulse Resp BP MAP (mmHg) SpO2 O2 Device Patient Position - Orthostatic VS Pain    03/30/25 0909 -- 71 -- -- -- -- -- -- --    03/30/25 0736 -- -- -- -- -- -- None (Room air) -- No Pain    03/30/25 07:21:48 97.8 °F (36.6 °C) -- 16 154/65 95 -- -- -- --    03/29/25 21:34:03 97.8 °F (36.6 °C) 70 18 131/62 85 95 % -- -- --    03/29/25 2049 -- -- -- -- -- -- -- -- No Pain    03/29/25 16:42:55 97.8 °F (36.6 °C) 65 -- -- -- 97 % -- -- --    03/29/25 15:22:27 97.5 °F (36.4 °C) 71 18 117/55 76 94 % -- -- --    03/29/25 0924 -- -- -- -- -- -- None (Room air) -- No Pain    03/29/25 07:30:58 97.5 °F (36.4 °C) 63 16 125/74 91 95 % -- -- --    03/28/25 23:49:43 97.8 °F (36.6 °C) 59 14 130/71 91 92 % -- -- --    03/28/25 2245 -- 66 18 133/65 93 95 % None (Room air) Lying No Pain    03/28/25 1646 98.7 °F (37.1 °C) 75 16 160/83 -- 96 % None (Room air) -- No Pain              Pertinent Labs/Diagnostic Test Results:    Radiology:  No orders to display     Cardiology:  ECG 12 lead   Final Result by Oskar Whitfield MD (03/28 1824)   Age and gender specific ECG analysis    Normal sinus rhythm   Incomplete right bundle branch block   Borderline ECG   When compared with ECG of 09-Feb-2025 15:44,   No significant change was found   Confirmed by Oskar Whitfield ([id number]) on 3/28/2025 6:24:31 PM        GI:  No orders to display           Results from last 7 days   Lab Units 03/29/25  0445 03/28/25  1656   WBC Thousand/uL 7.39 7.60   HEMOGLOBIN g/dL 12.0 12.3   HEMATOCRIT % 37.2 38.9   PLATELETS Thousands/uL 188 198   TOTAL NEUT ABS Thousands/µL 4.68 4.62         Results from last 7 days   Lab Units 03/29/25  0445 03/28/25  1656   SODIUM mmol/L 138 132*   POTASSIUM mmol/L 4.0 4.8   CHLORIDE mmol/L 105 95*   CO2 mmol/L 27 30   ANION GAP mmol/L 6 7   BUN mg/dL 16 23   CREATININE mg/dL 0.89 1.21   EGFR ml/min/1.73sq m 80 56   CALCIUM mg/dL 8.8 9.6   MAGNESIUM mg/dL 2.1  --    PHOSPHORUS mg/dL 2.6  --      Results from last 7 days   Lab Units 03/28/25  1656   AST U/L 23   ALT U/L 32   ALK PHOS U/L 203*   TOTAL PROTEIN g/dL 8.3   ALBUMIN g/dL 4.0   TOTAL BILIRUBIN mg/dL 0.46     Results from last 7 days   Lab Units 03/30/25  0718 03/29/25  2110 03/29/25  1713 03/29/25  1653 03/29/25  1629 03/29/25  1611 03/29/25  1600 03/29/25  1559 03/29/25  1111 03/29/25  0729 03/28/25  2239 03/28/25  1849   POC GLUCOSE mg/dl 148* 357* 140 76 40* 42* 46* 40* 238* 151* 160* 282*     Results from last 7 days   Lab Units 03/29/25  0445 03/28/25  1656   GLUCOSE RANDOM mg/dL 142* 509*             Beta- Hydroxybutyrate   Date Value Ref Range Status   02/09/2025 0.46 (H) 0.02 - 0.27 mmol/L Final      Past Medical History:   Diagnosis Date    Anemia     Arthritis     CAD (coronary artery disease)     Cardiac murmur     Depression     Resolved:11/25/16    Diabetes mellitus (HCC)     per Allscripts: type 2    GERD (gastroesophageal reflux disease)     History  of blood transfusion     2014, after firdt heart valve surgery in [place]    Hydrocephalus (HCC)     Hypertension     Impacted cerumen of right ear 4/11/2022    PAF (paroxysmal atrial fibrillation) (HCC)      Present on Admission:   Coronary artery disease   Essential hypertension   Mild cognitive impairment   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HCC)      Admitting Diagnosis: Hyperglycemia [R73.9]  Type 2 diabetes mellitus with hyperglycemia, with long-term current use of insulin (HCC) [E11.65, Z79.4]  Type 2 diabetes mellitus with chronic kidney disease, without long-term current use of insulin, unspecified CKD stage (HCC) [E11.22]  Age/Sex: 79 y.o. male    Network Utilization Review Department  ATTENTION: Please call with any questions or concerns to [phone] and carefully listen to the prompts so that you are directed to the right person. All voicemails are confidential.   For Discharge needs, contact Care Management DC Support Team at [phone] opt. 2  Send all requests for admission clinical reviews, approved or denied determinations and any other requests to dedicated fax number below belonging to the [facility] where the patient is receiving treatment. List of dedicated fax numbers for the Facilities:  FACILITY NAME UR FAX NUMBER   ADMISSION DENIALS (Administrative/Medical Necessity) [phone]   DISCHARGE SUPPORT TEAM (NETWORK) [phone]   PARENT CHILD HEALTH (Maternity/NICU/Pediatrics) [phone]   [facility] [phone]   [facility] [phone]   [facility] [phone]   [facility] [phone]   [facility] [phone]   [facility] [phone]   [facility] [phone]   [facility] [phone]   [facility] [phone]   [facility]  [facility] [phone]   [facility] [phone]   [facility] [phone]
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
## 2025-03-29 NOTE — ASSESSMENT & PLAN NOTE
History of diabetes mellitus type 2 uncontrolled presents with hyperglycemia  Reports symptoms of polyuria polydipsia  Received regular insulin 10 units IV and IV fluids in the ED, patient is being admitted at the request of the ED for further management endocrinology evaluation  Now on basal bolus regimen of Lantus and Humalog with meals  IV fluids  Monitor Accu-Cheks closely  Hypoglycemia protocol in place

## 2025-03-29 NOTE — ASSESSMENT & PLAN NOTE
Continue amlodipine 5 mg p.o. daily, losartan 25 mg p.o. daily  Monitor blood pressures  Avoid hypotension

## 2025-03-29 NOTE — PROGRESS NOTES
Progress Note - Hospitalist   Name: Renard Antoine 79 y.o. male I MRN: [id number]  [facility] Date of Admission: 3/28/2025   Date of Service: 3/29/2025 I Hospital Day: 0    Assessment & Plan  Hyperglycemia  History of diabetes mellitus type 2 uncontrolled presents with hyperglycemia  Reports symptoms of polyuria polydipsia  Received regular insulin 10 units IV and IV fluids in the ED, patient is being admitted at the request of the ED for further management endocrinology evaluation  Now on basal bolus regimen of Lantus and Humalog with meals  Continue with IV fluid for hydration  Monitor Accu-Cheks closely  Hypoglycemia protocol in place    Type 2 diabetes mellitus with hyperglycemia, with long-term current use of insulin ([facility])  Lab Results   Component Value Date    HGBA1C 9.9 (H) 02/09/2025       Recent Labs     03/28/25  1644 03/28/25  1849 03/28/25  2239 03/29/25  0729   POCGLU 485* 282* 160* 151*       Blood Sugar Average: Last 72 hrs:  (P) 269.5    Uncontrolled diabetes mellitus type 2  A1c 9.9  Reports taking insulin Lantus 18 units daily and Humalog 5 needs 3 times daily with meals and metformin  Hold metformin while inpatient  Continue Lantus 25 units daily, Humalog 8 units 3 times daily with meals  Medication compliance discussed and encouraged  Monitor Accu-Cheks  Avoid hypoglycemia  Hypoglycemia protocol in place  Endocrine consulted    Essential hypertension  Continue amlodipine 5 mg p.o. daily, losartan 25 mg p.o. daily  Monitor blood pressures  Avoid hypotension  Coronary artery disease  Continue atorvastatin and aspirin  Outpatient follow-up  H/O aortic valve replacement  History of bioprosthetic aortic valve replacement noted    Mild cognitive impairment  Continue donepezil  Delirium precautions  Reorientation, sleep hygiene  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HCC)  Continue escitalopram    VTE Pharmacologic Prophylaxis: VTE Score: 3 Moderate Risk (Score 3-4) - Pharmacological DVT  Prophylaxis Ordered: enoxaparin (Lovenox).    Mobility:   Basic Mobility Inpatient Raw Score: 20  JH-HLM Goal: 6: Walk 10 steps or more  JH-HLM Achieved: 6: Walk 10 steps or more  JH-HLM Goal achieved. Continue to encourage appropriate mobility.    Patient Centered Rounds: I performed bedside rounds with nursing staff today.   Discussions with Specialists or Other Care Team Provider: Nursing    Education and Discussions with Family / Patient:  Patient.     Current Length of Stay: 0 day(s)  Current Patient Status: Inpatient   Certification Statement: The patient will continue to require additional inpatient hospital stay due to management of hyperglycemia  Discharge Plan: Anticipate discharge tomorrow to home.    Code Status: Level 1 - Full Code    Subjective   Patient seen and examined  Comfortable in bed  Tolerating oral diet  No nausea vomiting or diarrhea    Objective :  Temp:  [97.5 °F (36.4 °C)-98.7 °F (37.1 °C)] 97.5 °F (36.4 °C)  HR:  [59-75] 63  BP: (125-160)/(65-83) 125/74  Resp:  [14-18] 16  SpO2:  [92 %-96 %] 95 %  O2 Device: None (Room air)    Body mass index is 21.35 kg/m².     Input and Output Summary (last 24 hours):   No intake or output data in the 24 hours ending 03/29/25 1058    Physical Exam  Patient is awake alert in no acute distress  Comfortable in bed  Lung clear to auscultation bilateral  Heart positive S1-S2 no murmur  Abdomen soft nontender  Lower extremities no edema    Lines/Drains:              Lab Results: I have reviewed the following results:   Results from last 7 days   Lab Units 03/29/25  0445   WBC Thousand/uL 7.39   HEMOGLOBIN g/dL 12.0   HEMATOCRIT % 37.2   PLATELETS Thousands/uL 188   SEGS PCT % 62   LYMPHO PCT % 20   MONO PCT % 11   EOS PCT % 5     Results from last 7 days   Lab Units 03/29/25  0445 03/28/25  1656   SODIUM mmol/L 138 132*   POTASSIUM mmol/L 4.0 4.8   CHLORIDE mmol/L 105 95*   CO2 mmol/L 27 30   BUN mg/dL 16 23   CREATININE mg/dL 0.89 1.21   ANION GAP mmol/L 6  7   CALCIUM mg/dL 8.8 9.6   ALBUMIN g/dL  --  4.0   TOTAL BILIRUBIN mg/dL  --  0.46   ALK PHOS U/L  --  203*   ALT U/L  --  32   AST U/L  --  23   GLUCOSE RANDOM mg/dL 142* 509*         Results from last 7 days   Lab Units 03/29/25  0729 03/28/25  2239 03/28/25  1849 03/28/25  1644   POC GLUCOSE mg/dl 151* 160* 282* 485*               Recent Cultures (last 7 days):         Imaging Results Review: No pertinent imaging studies reviewed.  Other Study Results Review: No additional pertinent studies reviewed.    Last 24 Hours Medication List:     Current Facility-Administered Medications:     acetaminophen (TYLENOL) tablet 650 mg, Q6H PRN    aluminum-magnesium hydroxide-simethicone (MAALOX) oral suspension 30 mL, Q6H PRN    amLODIPine (NORVASC) tablet 5 mg, Daily    aspirin chewable tablet 81 mg, Daily    atorvastatin (LIPITOR) tablet 20 mg, HS    donepezil (ARICEPT) tablet 5 mg, HS    enoxaparin (LOVENOX) subcutaneous injection 40 mg, Daily    escitalopram (LEXAPRO) tablet 5 mg, Daily    insulin glargine (LANTUS) subcutaneous injection 25 Units 0.25 mL, HS    insulin lispro (HumALOG/ADMELOG) 100 units/mL subcutaneous injection 1-5 Units, TID AC **AND** Fingerstick Glucose (POCT), TID AC    insulin lispro (HumALOG/ADMELOG) 100 units/mL subcutaneous injection 8 Units, TID With Meals    losartan (COZAAR) tablet 25 mg, Daily    ondansetron (ZOFRAN) injection 4 mg, Q6H PRN    oxybutynin (DITROPAN) tablet 5 mg, Daily    polyethylene glycol (MIRALAX) packet 17 g, Daily    sodium chloride 0.9 % infusion, Continuous, Last Rate: 100 mL/hr (03/28/25 2250)    Administrative Statements   Today, Patient Was Seen By: Jose De Jesus Rome, DO  I have spent a total time of 35 minutes in caring for this patient on the day of the visit/encounter including Counseling / Coordination of care, Documenting in the medical record, Reviewing/placing orders in the medical record (including tests, medications, and/or procedures), Obtaining or reviewing  history  , and Communicating with other healthcare professionals .    **Please Note: This note may have been constructed using a voice recognition system.**

## 2025-03-29 NOTE — CONSULTS
Consultation - Endocrinology   Name: Renard Antoine 79 y.o. male I MRN: [id number]  Unit/Bed#: [facility] 806-01 I Date of Admission: 3/28/2025   Date of Service: 3/29/2025 I Hospital Day: 0  Inpatient consult to Endocrinology  Consult performed by: Priti Van MD  Consult ordered by: Bibiana Slade MD        Physician Requesting Evaluation: Jose De Jesus Rome DO   Reason for Evaluation / Principal Problem: D2M    Assessment & Plan  Type 2 diabetes mellitus with hyperglycemia, with long-term current use of insulin ([facility])  Lab Results   Component Value Date    HGBA1C 9.9 (H) 02/09/2025     Home Regimen: Glargine 18 units at bedtime, Humalog 5 units with meals, Metformin 500 mg (?)     Patient presented with hyperglycemia due to missed insulin doses for several days. Patient admits to having some issues with his memory    Currently on Lantus 25 units and Humalog 8 units   Doses are OK for now, but will continue to monitor    Endocrinology will continue to follow and adjust as needed    For discharge, he can likely resume home regimen, however there is question if patient is able to care for himself at home-- would discuss with family options for further assistance; will reach out to .           History of Present Illness   Renard Antoine is a 79 y.o. male with history of D2M, CKD, dementia, HTN, CAD, AVR, and normal pressure hydrocephalus (shunt placed in the past) who presented with elevated BG levels. He admits that his memory has been progressively getting worse and he forgets to take his medications sometimes for several days. He states that with his memory issues and his wife at home being sick as well, he has been having some issues.     He reports being diagnosed with DM for >20 years. He was originally managed with oral medications but soon transitioned to insulin. He believes that he is taking a long-acting once a day insulin at night and is supposed to take a meal-time insulin-- but has been  confused lately because his insulin brands have been changed and he does not know which one to take. He is not monitoring his glucose levels at home.     He denies neuropathy, and CVA. But thinks he might have some vision problems, and admits to history of CAD/CABG.     Review of Systems   Constitutional:  Negative for appetite change, chills, fatigue, fever and unexpected weight change.   HENT:  Negative for ear pain, sore throat and trouble swallowing.    Eyes:  Negative for pain and visual disturbance.   Respiratory:  Negative for cough and shortness of breath.    Cardiovascular:  Negative for chest pain and palpitations.   Gastrointestinal:  Negative for abdominal pain, constipation, diarrhea, nausea and vomiting.   Endocrine: Negative for polydipsia, polyphagia and polyuria.   Genitourinary:  Positive for frequency. Negative for dysuria, hematuria and urgency.   Musculoskeletal:  Negative for arthralgias and back pain.   Skin:  Negative for color change and rash.   Neurological:  Negative for dizziness, seizures, syncope, weakness, light-headedness and headaches.   Psychiatric/Behavioral:  Negative for confusion and decreased concentration.    All other systems reviewed and are negative.    Social History     Tobacco Use    Smoking status: Former     Current packs/day: 0.00     Types: Cigarettes     Start date: 1981     Quit date: 1996     Years since quittin.3     Passive exposure: Past    Smokeless tobacco: Former   Vaping Use    Vaping status: Never Used   Substance and Sexual Activity    Alcohol use: Not Currently    Drug use: No    Sexual activity: Never       Objective :  Temp:  [97.5 °F (36.4 °C)-98.7 °F (37.1 °C)] 97.5 °F (36.4 °C)  HR:  [59-75] 63  BP: (125-160)/(65-83) 125/74  Resp:  [14-18] 16  SpO2:  [92 %-96 %] 95 %  O2 Device: None (Room air)    Physical Exam  Constitutional:       Appearance: Normal appearance.   HENT:      Head: Normocephalic and atraumatic.      Nose: Nose  normal.      Mouth/Throat:      Mouth: Mucous membranes are moist.      Pharynx: Oropharynx is clear.   Eyes:      Extraocular Movements: Extraocular movements intact.      Conjunctiva/sclera: Conjunctivae normal.   Cardiovascular:      Rate and Rhythm: Normal rate.      Pulses: Normal pulses.   Pulmonary:      Effort: Pulmonary effort is normal.   Abdominal:      General: Abdomen is flat.   Musculoskeletal:         General: Normal range of motion.      Cervical back: Normal range of motion.   Skin:     General: Skin is warm and dry.   Neurological:      Mental Status: He is alert and oriented to person, place, and time.   Psychiatric:         Attention and Perception: Attention normal.         Mood and Affect: Mood normal.         Speech: Speech normal.         Behavior: Behavior normal. Behavior is cooperative.         Cognition and Memory: Memory is impaired. He exhibits impaired recent memory and impaired remote memory.            Lab Results: I have reviewed the following results:CBC/BMP:   .     03/29/25  0445   WBC 7.39   HGB 12.0   HCT 37.2      SODIUM 138   K 4.0      CO2 27   BUN 16   CREATININE 0.89   GLUC 142*   MG 2.1   PHOS 2.6    , Creatinine Clearance: Estimated Creatinine Clearance: 56.2 mL/min (by C-G formula based on SCr of 0.89 mg/dL).

## 2025-03-29 NOTE — ED ATTENDING ATTESTATION
3/28/2025  I, Yasir Gaona DO, saw and evaluated the patient. I have discussed the patient with the resident/non-physician practitioner and agree with the resident's/non-physician practitioner's findings, Plan of Care, and MDM as documented in the resident's/non-physician practitioner's note, except where noted. All available labs and Radiology studies were reviewed.  I was present for key portions of any procedure(s) performed by the resident/non-physician practitioner and I was immediately available to provide assistance.       At this point I agree with the current assessment done in the Emergency Department.  I have conducted an independent evaluation of this patient a history and physical is as follows:    ED Course  ED Course as of 03/28/25 2028   Fri Mar 28, 2025   1650 79M with running out of insulin. Hyperglycemia, no complaints at this time.   ROS: per resident physician note    Gen: NAD, AA&Ox3  HEENT: PERRL, EOMI  Neck: supple  CV: RRR  Lungs: CTA B/L  Abdomen: soft, NT/ND  Ext: no swelling or deformity  Neuro: 5/5 strength all extremities, sensation grossly intact  Skin: no rash    Ddx- hyperglycemia, DKA, HHNK, med noncompliance     Plan check labs, ivfs and dose of insulin          Critical Care Time  Procedures

## 2025-03-29 NOTE — ASSESSMENT & PLAN NOTE
Lab Results   Component Value Date    HGBA1C 9.9 (H) 02/09/2025       Recent Labs     03/28/25  1644 03/28/25  1849 03/28/25  2239 03/29/25  0729   POCGLU 485* 282* 160* 151*       Blood Sugar Average: Last 72 hrs:  (P) 269.5    Uncontrolled diabetes mellitus type 2  A1c 9.9  Reports taking insulin Lantus 18 units daily and Humalog 5 needs 3 times daily with meals and metformin  Hold metformin while inpatient  Continue Lantus 25 units daily, Humalog 8 units 3 times daily with meals  Medication compliance discussed and encouraged  Monitor Accu-Cheks  Avoid hypoglycemia  Hypoglycemia protocol in place  Endocrine consulted

## 2025-03-29 NOTE — ASSESSMENT & PLAN NOTE
Lab Results   Component Value Date    HGBA1C 9.9 (H) 02/09/2025       Recent Labs     03/28/25  1644 03/28/25  1849   POCGLU 485* 282*       Blood Sugar Average: Last 72 hrs:  (P) 383.5    Uncontrolled diabetes mellitus type 2  A1c 9  Reports taking insulin Lantus 18 units daily and Humalog 5 needs 3 times daily with meals and metformin  Hold metformin while inpatient  Will have him on Lantus 25 units daily, Humalog 8 units 3 times daily with meals  Medication compliance discussed and encouraged  Will request endocrinology evaluation  Monitor Accu-Cheks  Avoid hypoglycemia  Hypoglycemia protocol in place

## 2025-03-29 NOTE — ASSESSMENT & PLAN NOTE
Lab Results   Component Value Date    HGBA1C 9.9 (H) 02/09/2025     Home Regimen: Glargine 18 units at bedtime, Humalog 5 units with meals, Metformin 500 mg (?)     Patient presented with hyperglycemia due to missed insulin doses for several days. Patient admits to having some issues with his memory    Currently on Lantus 25 units and Humalog 8 units   Doses are OK for now, but will continue to monitor    Endocrinology will continue to follow and adjust as needed    For discharge, he can likely resume home regimen, however there is question if patient is able to care for himself at home-- would discuss with family options for further assistance; will reach out to CM.

## 2025-03-29 NOTE — PLAN OF CARE
Problem: PAIN - ADULT  Goal: Verbalizes/displays adequate comfort level or baseline comfort level  Description: Interventions:- Encourage patient to monitor pain and request assistance- Assess pain using appropriate pain scale- Administer analgesics based on type and severity of pain and evaluate response- Implement non-pharmacological measures as appropriate and evaluate response- Consider cultural and social influences on pain and pain management- Notify physician/advanced practitioner if interventions unsuccessful or patient reports new pain  Outcome: Progressing     Problem: INFECTION - ADULT  Goal: Absence or prevention of progression during hospitalization  Description: INTERVENTIONS:- Assess and monitor for signs and symptoms of infection- Monitor lab/diagnostic results- Monitor all insertion sites, i.e. indwelling lines, tubes, and drains- Monitor endotracheal if appropriate and nasal secretions for changes in amount and color- [place] appropriate cooling/warming therapies per order- Administer medications as ordered- Instruct and encourage patient and family to use good hand hygiene technique- Identify and instruct in appropriate isolation precautions for identified infection/condition  Outcome: Progressing  Goal: Absence of fever/infection during neutropenic period  Description: INTERVENTIONS:- Monitor WBC  Outcome: Progressing     Problem: SAFETY ADULT  Goal: Patient will remain free of falls  Description: INTERVENTIONS:- Educate patient/family on patient safety including physical limitations- Instruct patient to call for assistance with activity - Consult OT/PT to assist with strengthening/mobility - Keep Call bell within reach- Keep bed low and locked with side rails adjusted as appropriate- Keep care items and personal belongings within reach- Initiate and maintain comfort rounds- Make Fall Risk Sign visible to staff- Apply yellow socks and bracelet for high fall risk patients- Consider moving  patient to room near nurses station  Outcome: Progressing  Goal: Maintain or return to baseline ADL function  Description: INTERVENTIONS:-  Assess patient's ability to carry out ADLs; assess patient's baseline for ADL function and identify physical deficits which impact ability to perform ADLs (bathing, care of mouth/teeth, toileting, grooming, dressing, etc.)- Assess/evaluate cause of self-care deficits - Assess range of motion- Assess patient's mobility; develop plan if impaired- Assess patient's need for assistive devices and provide as appropriate- Encourage maximum independence but intervene and supervise when necessary- Involve family in performance of ADLs- Assess for home care needs following discharge - Consider OT consult to assist with ADL evaluation and planning for discharge- Provide patient education as appropriate  Outcome: Progressing  Goal: Maintains/Returns to pre admission functional level  Description: INTERVENTIONS:- Perform AM-PAC 6 Click Basic Mobility/ Daily Activity assessment daily.- Set and communicate daily mobility goal to care team and patient/family/caregiver. - Collaborate with rehabilitation services on mobility goals if consulted- Perform Range of Motion- Out of bed for toileting- Record patient progress and toleration of activity level   Outcome: Progressing

## 2025-03-30 VITALS
TEMPERATURE: 97.8 F | DIASTOLIC BLOOD PRESSURE: 65 MMHG | BODY MASS INDEX: 21.26 KG/M2 | OXYGEN SATURATION: 95 % | RESPIRATION RATE: 16 BRPM | HEIGHT: 66 IN | WEIGHT: 132.28 LBS | SYSTOLIC BLOOD PRESSURE: 154 MMHG | HEART RATE: 71 BPM

## 2025-03-30 LAB
GLUCOSE SERPL-MCNC: 148 MG/DL (ref 65–140)
GLUCOSE SERPL-MCNC: 247 MG/DL (ref 65–140)

## 2025-03-30 PROCEDURE — 82948 REAGENT STRIP/BLOOD GLUCOSE: CPT

## 2025-03-30 PROCEDURE — 99239 HOSP IP/OBS DSCHRG MGMT >30: CPT | Performed by: INTERNAL MEDICINE

## 2025-03-30 RX ORDER — ASPIRIN 81 MG/1
81 TABLET, CHEWABLE ORAL DAILY
Qty: 30 TABLET | Refills: 0 | Status: SHIPPED | OUTPATIENT
Start: 2025-03-31

## 2025-03-30 RX ADMIN — POLYETHYLENE GLYCOL 3350 17 G: 17 POWDER, FOR SOLUTION ORAL at 09:09

## 2025-03-30 RX ADMIN — ENOXAPARIN SODIUM 40 MG: 40 INJECTION SUBCUTANEOUS at 09:05

## 2025-03-30 RX ADMIN — INSULIN LISPRO 4 UNITS: 100 INJECTION, SOLUTION INTRAVENOUS; SUBCUTANEOUS at 09:05

## 2025-03-30 RX ADMIN — OXYBUTYNIN CHLORIDE 5 MG: 5 TABLET ORAL at 09:05

## 2025-03-30 RX ADMIN — ESCITALOPRAM OXALATE 5 MG: 5 TABLET, FILM COATED ORAL at 09:05

## 2025-03-30 RX ADMIN — LOSARTAN POTASSIUM 25 MG: 25 TABLET, FILM COATED ORAL at 09:05

## 2025-03-30 RX ADMIN — ASPIRIN 81 MG CHEWABLE TABLET 81 MG: 81 TABLET CHEWABLE at 09:05

## 2025-03-30 RX ADMIN — AMLODIPINE BESYLATE 5 MG: 5 TABLET ORAL at 09:05

## 2025-03-30 NOTE — DISCHARGE SUMMARY
Discharge Summary - Hospitalist   Name: Renard Antoine 79 y.o. male I MRN: [id number]  [facility] Date of Admission: 3/28/2025   Date of Service: 3/30/2025 I Hospital Day: 1     Assessment & Plan  Hyperglycemia  History of diabetes mellitus type 2 uncontrolled presents with hyperglycemia  Reports symptoms of polyuria polydipsia  Received regular insulin 10 units IV and IV fluids in the ED, patient is being admitted at the request of the ED for further management endocrinology evaluation  Now on basal bolus regimen of Lantus and Humalog with meals  Hydrated with IV fluid with improvement in his symptoms  Evaluated by endocrine  Patient developed hypoglycemia on higher dose of insulin  Currently he is in stable condition  Wants to go home, Asymptomatic  Will discharge home on home dose insulin      Type 2 diabetes mellitus with hyperglycemia, with long-term current use of insulin ([facility])  Lab Results   Component Value Date    HGBA1C 9.9 (H) 02/09/2025       Recent Labs     03/29/25  1713 03/29/25  2110 03/30/25  0718 03/30/25  1125   POCGLU 140 357* 148* 247*       Blood Sugar Average: Last 72 hrs:  (P) 175.8912667655050543    Uncontrolled diabetes mellitus type 2  A1c 9.9  Reports taking insulin Lantus 18 units daily and Humalog 5 needs 3 times daily with meals and metformin  Hold metformin while inpatient  Continue Lantus 25 units daily, Humalog 8 units 3 times daily with meals  Medication compliance discussed and encouraged  Monitor Accu-Cheks  Avoid hypoglycemia  Hypoglycemia protocol in place  Endocrine consulted  Resume home insulin after discharge  Outpatient endocrine follow-up    Essential hypertension  Continue amlodipine 5 mg p.o. daily, losartan 25 mg p.o. daily  Monitor blood pressures  Avoid hypotension  Coronary artery disease  Continue atorvastatin and aspirin  Outpatient follow-up  H/O aortic valve replacement  History of bioprosthetic aortic valve replacement noted    Mild cognitive  impairment  Continue donepezil  Delirium precautions  Reorientation, sleep hygiene  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HCC)  Continue escitalopram.     Medical Problems       Resolved Problems  Date Reviewed: 3/30/2025   None       Discharging Physician / Practitioner: Jose De Jesus Rome DO  PCP: Armando Lozada DO  Admission Date:   Admission Orders (From admission, onward)       Ordered        03/29/25 1058  INPATIENT ADMISSION  Once            03/28/25 1844  Place in Observation  Once                          Discharge Date: 03/30/25    Consultations During Hospital Stay:  Endocrine    Procedures Performed:   none    Significant Findings / Test Results:   As above    Incidental Findings:   none    Test Results Pending at Discharge (will require follow up):   none     Outpatient Tests Requested:  none    Complications:  none    Reason for Admission:   Symptomatic hyperglycemia    Hospital Course:   Renard Antoine is a 79 y.o. male patient who originally presented to the hospital on 3/28/2025 due to hyperglycemia  Patient with a PMH of diabetes mellitus type 2 uncontrolled, history of coronary disease, depression, cognitive impairment, hypertension, history of aortic valve replacement who presents with hyperglycemia   Patient presents with glucose levels more than 400.   He also reports history of polyuria polydipsia and nocturia.  He does not follow with endocrinology.  He reports compliance with insulin therapy.  Reports appetite is good.  Weight is stable.    Patient was admitted to the hospital, started on IV fluid for hydration, basal and prandial insulin  Evaluated by endocrine however patient developed hypoglycemia on higher dose of insulin    Currently he is in stable condition, tolerating oral diet, symptoms almost improved, and wants to go home   Stable for discharge home today to resume home dose insulin      Please see above list of diagnoses and related plan for additional information.     Condition at  "Discharge: stable    Discharge Day Visit / Exam:   Subjective:    Patient seen and examined  Comfortable in bed  Wants to go home   Tolerating oral diet    Vitals: Blood Pressure: 154/65 (03/30/25 0721)  Pulse: 71 (03/30/25 0909)  Temperature: 97.8 °F (36.6 °C) (03/30/25 0721)  Respirations: 16 (03/30/25 0721)  Height: 5' 6\" (167.6 cm) (03/28/25 2349)  Weight - Scale: 60 kg (132 lb 4.4 oz) (03/28/25 2349)  SpO2: 95 % (03/29/25 2134)  Physical Exam   Patient is awake alert in no acute distress  Comfortable in bed  Lung clear to auscultation bilateral  Heart positive S1-S2 no murmur  Abdomen soft nontender  Lower extremities no edema    Discussion with Family: Attempted to update  (daughter) via phone. Unable to contact.    Discharge instructions/Information to patient and family:   See after visit summary for information provided to patient and family.      Provisions for Follow-Up Care:  See after visit summary for information related to follow-up care and any pertinent home health orders.      Mobility at time of Discharge:   Basic Mobility Inpatient Raw Score: 20  JH-HLM Goal: 6: Walk 10 steps or more  JH-HLM Achieved: 6: Walk 10 steps or more  HLM Goal achieved. Continue to encourage appropriate mobility.     Disposition:   Home    Planned Readmission: no    Discharge Medications:  See after visit summary for reconciled discharge medications provided to patient and/or family.      Administrative Statements   Discharge Statement:  I have spent a total time of 33 minutes in caring for this patient on the day of the visit/encounter. >30 minutes of time was spent on: Counseling / Coordination of care, Documenting in the medical record, Reviewing / ordering tests, medicine, procedures  , and Communicating with other healthcare professionals .    **Please Note: This note may have been constructed using a voice recognition system**  "

## 2025-03-30 NOTE — ASSESSMENT & PLAN NOTE
History of diabetes mellitus type 2 uncontrolled presents with hyperglycemia  Reports symptoms of polyuria polydipsia  Received regular insulin 10 units IV and IV fluids in the ED, patient is being admitted at the request of the ED for further management endocrinology evaluation  Now on basal bolus regimen of Lantus and Humalog with meals  Hydrated with IV fluid with improvement in his symptoms  Evaluated by endocrine  Patient developed hypoglycemia on higher dose of insulin  Currently he is in stable condition  Wants to go home, Asymptomatic  Will discharge home on home dose insulin       12-Dec-2021

## 2025-03-30 NOTE — PLAN OF CARE
Problem: PAIN - ADULT  Goal: Verbalizes/displays adequate comfort level or baseline comfort level  Description: Interventions:- Encourage patient to monitor pain and request assistance- Assess pain using appropriate pain scale- Administer analgesics based on type and severity of pain and evaluate response- Implement non-pharmacological measures as appropriate and evaluate response- Consider cultural and social influences on pain and pain management- Notify physician/advanced practitioner if interventions unsuccessful or patient reports new pain  Outcome: Progressing     Problem: INFECTION - ADULT  Goal: Absence or prevention of progression during hospitalization  Description: INTERVENTIONS:- Assess and monitor for signs and symptoms of infection- Monitor lab/diagnostic results- Monitor all insertion sites, i.e. indwelling lines, tubes, and drains- Monitor endotracheal if appropriate and nasal secretions for changes in amount and color- [place] appropriate cooling/warming therapies per order- Administer medications as ordered- Instruct and encourage patient and family to use good hand hygiene technique- Identify and instruct in appropriate isolation precautions for identified infection/condition  Outcome: Progressing  Goal: Absence of fever/infection during neutropenic period  Description: INTERVENTIONS:- Monitor WBC  Outcome: Progressing

## 2025-03-30 NOTE — ASSESSMENT & PLAN NOTE
Lab Results   Component Value Date    HGBA1C 9.9 (H) 02/09/2025       Recent Labs     03/29/25  1713 03/29/25  2110 03/30/25  0718 03/30/25  1125   POCGLU 140 357* 148* 247*       Blood Sugar Average: Last 72 hrs:  (P) 175.6047078849482168    Uncontrolled diabetes mellitus type 2  A1c 9.9  Reports taking insulin Lantus 18 units daily and Humalog 5 needs 3 times daily with meals and metformin  Hold metformin while inpatient  Continue Lantus 25 units daily, Humalog 8 units 3 times daily with meals  Medication compliance discussed and encouraged  Monitor Accu-Cheks  Avoid hypoglycemia  Hypoglycemia protocol in place  Endocrine consulted  Resume home insulin after discharge  Outpatient endocrine follow-up

## 2025-03-30 NOTE — PLAN OF CARE
Problem: PAIN - ADULT  Goal: Verbalizes/displays adequate comfort level or baseline comfort level  Description: Interventions:- Encourage patient to monitor pain and request assistance- Assess pain using appropriate pain scale- Administer analgesics based on type and severity of pain and evaluate response- Implement non-pharmacological measures as appropriate and evaluate response- Consider cultural and social influences on pain and pain management- Notify physician/advanced practitioner if interventions unsuccessful or patient reports new pain  Outcome: Progressing     Problem: INFECTION - ADULT  Goal: Absence or prevention of progression during hospitalization  Description: INTERVENTIONS:- Assess and monitor for signs and symptoms of infection- Monitor lab/diagnostic results- Monitor all insertion sites, i.e. indwelling lines, tubes, and drains- Monitor endotracheal if appropriate and nasal secretions for changes in amount and color- [place] appropriate cooling/warming therapies per order- Administer medications as ordered- Instruct and encourage patient and family to use good hand hygiene technique- Identify and instruct in appropriate isolation precautions for identified infection/condition  Outcome: Progressing  Goal: Absence of fever/infection during neutropenic period  Description: INTERVENTIONS:- Monitor WBC  Outcome: Progressing

## 2025-03-30 NOTE — QUICK NOTE
Type 2 diabetes mellitus with hyperglycemia, with long-term current use of insulin       Yesterday experienced hypoglycemic evidence. We reduced lantus to 15 units at bedtime and Humalog to 4 units with meals TID.     Continue this regimen for now.       Endocrinology will continue to follow and adjust as needed

## 2025-03-31 ENCOUNTER — PATIENT OUTREACH (OUTPATIENT)
Dept: CASE MANAGEMENT | Facility: OTHER | Age: 80
End: 2025-03-31

## 2025-04-01 ENCOUNTER — TRANSITIONAL CARE MANAGEMENT (OUTPATIENT)
Dept: INTERNAL MEDICINE CLINIC | Facility: CLINIC | Age: 80
End: 2025-04-01

## 2025-04-01 ENCOUNTER — PATIENT OUTREACH (OUTPATIENT)
Dept: CASE MANAGEMENT | Facility: OTHER | Age: 80
End: 2025-04-01

## 2025-04-01 NOTE — PROGRESS NOTES
"Outpatient Care Management note- CM referral, HRR    Chart review completed    Left message on voicemail for daughterDamaris to return call. Listed as preferred contact and on  consent.     Patient with h/o NPH s/p shunt, HTN, DM2, CAD s/p AVR, mild cognitive impairment, CKD2, HLD, Afib, depression, OA    Patient was admitted to [facility] from 3/25/25 to 3/30/25 for hyperglycemia. The patient reported he did not have his insulin at home, \"ran out 2 days ago due to issues with the pharmacy\" and BG in the 600s. ED  and 509. Noted that his PCP wanted him to follow up with Endo to change his insulin to a pill form of treatment. Endo notes that the patient admits to his memory issues getting progressively worse and he forgets to take his medication sometimes and his wife is home sick as well. Patient was d/c to home with recommended follow up with PCP and Endo    Per AVS, patient is to take Lantus 18 units at bedtime and Humalog 5 units TID with meals.     Renard is scheduled with the clinical pharmacist for 4/2/25, PCP on 4/7/25 and Endo on 4/15/25.    Await a call back from the daughter.   "

## 2025-04-02 ENCOUNTER — OFFICE VISIT (OUTPATIENT)
Dept: INTERNAL MEDICINE CLINIC | Facility: CLINIC | Age: 80
End: 2025-04-02

## 2025-04-02 DIAGNOSIS — E11.65 TYPE 2 DIABETES MELLITUS WITH HYPERGLYCEMIA, WITH LONG-TERM CURRENT USE OF INSULIN (HCC): Primary | ICD-10-CM

## 2025-04-02 DIAGNOSIS — Z79.4 TYPE 2 DIABETES MELLITUS WITH HYPERGLYCEMIA, WITH LONG-TERM CURRENT USE OF INSULIN (HCC): Primary | ICD-10-CM

## 2025-04-02 LAB
DME PARACHUTE DELIVERY DATE REQUESTED: NORMAL
DME PARACHUTE ITEM DESCRIPTION: NORMAL
DME PARACHUTE ITEM DESCRIPTION: NORMAL
DME PARACHUTE ORDER STATUS: NORMAL
DME PARACHUTE SUPPLIER NAME: NORMAL
DME PARACHUTE SUPPLIER PHONE: NORMAL

## 2025-04-02 PROCEDURE — PBNCHG PB NO CHARGE PLACEHOLDER: Performed by: PHARMACIST

## 2025-04-02 RX ORDER — BLOOD-GLUCOSE METER
KIT MISCELLANEOUS
Qty: 1 KIT | Refills: 0 | Status: SHIPPED | OUTPATIENT
Start: 2025-04-02

## 2025-04-02 RX ORDER — LANCETS 33 GAUGE
EACH MISCELLANEOUS
Qty: 400 EACH | Refills: 3 | Status: SHIPPED | OUTPATIENT
Start: 2025-04-02

## 2025-04-02 RX ORDER — BLOOD SUGAR DIAGNOSTIC
STRIP MISCELLANEOUS
Qty: 400 EACH | Refills: 3 | Status: SHIPPED | OUTPATIENT
Start: 2025-04-02

## 2025-04-02 NOTE — UTILIZATION REVIEW
"NOTIFICATION OF INPATIENT ADMISSION   AUTHORIZATION REQUEST   SERVICING FACILITY:   [facility]  Address: [address] [place] [address]  Tax ID: [id number]  NPI: [id number] ATTENDING PROVIDER:  Attending Name and NPI#: Jose De Jesus Rome Do [[id number]]  Address: [address] [place] [address]  Phone: [phone]   ADMISSION INFORMATION:  Place of Service: Inpatient [facility]  Place of Service Code: 21  Inpatient Admission Date/Time: 3/29/25 10:58 AM  Discharge Date/Time: 3/30/2025 12:39 PM  Admitting Diagnosis Code/Description:  Hyperglycemia [R73.9]  Type 2 diabetes mellitus with hyperglycemia, with long-term current use of insulin (HCC) [E11.65, Z79.4]  Type 2 diabetes mellitus with chronic kidney disease, without long-term current use of insulin, unspecified CKD stage (HCC) [E11.22]     UTILIZATION REVIEW CONTACT:  Genesis Noland (Karina\) Utilization   Network Utilization Review Department  Phone: [phone]  Fax: [phone]  Email: [email or web address]  Contact for approvals/pending authorizations, clinical reviews, and discharge.     PHYSICIAN ADVISORY SERVICES:  Medical Necessity Denial & Csvj-is-Tkuv Review  Phone: [phone]  Fax: [phone]  Email: [email or web address]     DISCHARGE SUPPORT TEAM:  For Patients Discharge Needs & Updates  Phone: [phone] opt. 2 Fax: [phone]  Email: [email or web address]     "

## 2025-04-02 NOTE — PROGRESS NOTES
[facility] Clinical Pharmacy Services  Radha Coats, Pharmacist      Assessment/ Plan     Assessment & Plan  Type 2 diabetes mellitus with hyperglycemia, with long-term current use of insulin ([facility])    Lab Results   Component Value Date    HGBA1C 9.9 (H) 02/09/2025   No FBG recorded to adjust insulin  Applied for Jardiance PAP today  Sent orders for new glucometer and testing supplies  Sent orders for CGM (Jules 3 plus) to [facility] Medical DME supplier  Provided glucose log for patient to record at least FBG and bring to endo appt for lantus titration  Plan is to adjust insulin off CGM results, dc humalog once jardiance pap is received, continue metformin    Orders:    Blood Glucose Monitoring Suppl (OneTouch Verio Reflect) w/Device KIT; Check blood sugars four times daily. Please substitute with appropriate alternative as covered by patient's insurance. Dx: E11.65    glucose blood (OneTouch Verio) test strip; Check blood sugars four times daily. Please substitute with appropriate alternative as covered by patient's insurance. Dx: E11.65    OneTouch Delica Lancets 33G MISC; Check blood sugars four times daily. Please substitute with appropriate alternative as covered by patient's insurance. Dx: E11.65    metFORMIN (GLUCOPHAGE) 500 mg tablet; Take 1 tablet (500 mg total) by mouth 2 (two) times a day with meals      Follow-up: to apply/setup Jules 3 plus in approx. 2 weeks    Subjective     Medication Adherence/ Tolerability/ Cost:  Patient denies side effects, no issues with cost, some missed doses in last two week  amLODIPine  aspirin  atorvastatin  Comfort EZ Pen Needles Misc  donepezil  escitalopram  Insulin Glargine Solostar Sopn  insulin lispro  losartan  [place] Tabs  metFORMIN  OneTouch Delica Lancets 33G Misc  OneTouch Verio Reflect Kit  OneTouch Verio Strp  trospium chloride      2. Lifestyle:   Last visit with dietician: n/a  Previously attended Living Well with Diabetes Class: no  Diet Recall:   Breakfast:    Lunch:   Dinner:   Snacks:   Beverages:   Physical Activity:     3. Home monitoring devices  Glucometer: , Brand: unknown - will order new one, patient states it is old  Continuous Glucose Monitor: No, Brand: sending mayuri 3 plus today  Blood Pressure monitor: No, Brand: n/a    Hypoglycemia: The patient had 0 episodes/symptoms of hypoglycemia.   Hyperglycemia: The patient had some symptoms of hyperglycemia. - polyuria    Objective       Blood Sugar Readings  CGM Report scanned into chart - sending order today, likely will use reader versus phone  The patient is currently checking blood glucose 1-2 times per day. Patient does not report with SMBG logs.    Date AM Post-Breakfast Lunch Post-Lunch Dinner Post-Dinner Comments                                                                                                                           Avg                ASCVD Risk:  The ASCVD Risk score (Marielena GUNTER, et al., 2019) failed to calculate for the following reasons:    The 2019 ASCVD risk score is only valid for ages 40 to 79     Vitals:  There were no vitals filed for this visit.    Eye Exam:    Lab Results   Component Value Date    LEFTDIABRET None 11/02/2020    RIGHTDIABRET None 11/02/2020       Labs:  Lab Results   Component Value Date    SODIUM 138 03/29/2025    K 4.0 03/29/2025    EGFR 80 03/29/2025    CREATININE 0.89 03/29/2025    GLUF 140 (H) 02/14/2024    TRTTUAJB30 519 02/12/2025    MICROALBCRE 1,638.6 (H) 08/17/2023       Lab Results   Component Value Date    HGBA1C 9.9 (H) 02/09/2025    HGBA1C 7.0 (A) 05/14/2024    HGBA1C 8.6 (A) 02/01/2024         Pharmacist Tracking Tool     Pharmacist Tracking Tool  Reason For Outreach: Embedded Pharmacist  Demographics:  Intervention Method: In Person  Type of Intervention: New  Topics Addressed: Diabetes  Pharmacologic Interventions: Medication Initiation, Medication Discontinuation, Dose or Frequency Adjusted, Prevent or Manage STACIE, Drug Interaction , and Med  Rec  Non-Pharmacologic Interventions: Adherence addressed, Care coordination, Chart update, Cost, Disease state education, Home Monitoring, Labs, Medication Monitoring, Care Gap, Medication/Device education, and Personal CGM  Time:  Direct Patient Care:  45  mins  Care Coordination:  30  mins  Recommendation Recipient: Patient/Caregiver  Outcome: Accepted

## 2025-04-02 NOTE — ASSESSMENT & PLAN NOTE
Lab Results   Component Value Date    HGBA1C 9.9 (H) 02/09/2025   No FBG recorded to adjust insulin  Applied for Jardiance PAP today  Sent orders for new glucometer and testing supplies  Sent orders for CGM (Jules 3 plus) to [facility] Medical DME supplier  Provided glucose log for patient to record at least FBG and bring to endo appt for lantus titration  Plan is to adjust insulin off CGM results, dc humalog once jardiance pap is received, continue metformin    Orders:    Blood Glucose Monitoring Suppl (OneTouch Verio Reflect) w/Device KIT; Check blood sugars four times daily. Please substitute with appropriate alternative as covered by patient's insurance. Dx: E11.65    glucose blood (OneTouch Verio) test strip; Check blood sugars four times daily. Please substitute with appropriate alternative as covered by patient's insurance. Dx: E11.65    OneTouch Delica Lancets 33G MISC; Check blood sugars four times daily. Please substitute with appropriate alternative as covered by patient's insurance. Dx: E11.65    metFORMIN (GLUCOPHAGE) 500 mg tablet; Take 1 tablet (500 mg total) by mouth 2 (two) times a day with meals

## 2025-04-03 ENCOUNTER — DOCUMENTATION (OUTPATIENT)
Dept: ADMINISTRATIVE | Facility: OTHER | Age: 80
End: 2025-04-03

## 2025-04-03 NOTE — PROGRESS NOTES
04/03/25 10:09 AM    Annual Wellness Visit outreach is not required, patient seen in last 3 months.     Thank you.  Chelsey Suarez MA  [facility]

## 2025-04-07 ENCOUNTER — OFFICE VISIT (OUTPATIENT)
Dept: INTERNAL MEDICINE CLINIC | Facility: CLINIC | Age: 80
End: 2025-04-07
Payer: COMMERCIAL

## 2025-04-07 ENCOUNTER — TELEPHONE (OUTPATIENT)
Dept: ADMINISTRATIVE | Facility: OTHER | Age: 80
End: 2025-04-07

## 2025-04-07 VITALS
HEIGHT: 66 IN | HEART RATE: 68 BPM | OXYGEN SATURATION: 96 % | WEIGHT: 133.6 LBS | TEMPERATURE: 97.7 F | DIASTOLIC BLOOD PRESSURE: 60 MMHG | SYSTOLIC BLOOD PRESSURE: 124 MMHG | BODY MASS INDEX: 21.47 KG/M2

## 2025-04-07 DIAGNOSIS — E11.65 TYPE 2 DIABETES MELLITUS WITH HYPERGLYCEMIA, WITH LONG-TERM CURRENT USE OF INSULIN (HCC): Primary | ICD-10-CM

## 2025-04-07 DIAGNOSIS — Z59.82 TRANSPORTATION INSECURITY: ICD-10-CM

## 2025-04-07 DIAGNOSIS — Z79.4 TYPE 2 DIABETES MELLITUS WITH HYPERGLYCEMIA, WITH LONG-TERM CURRENT USE OF INSULIN (HCC): Primary | ICD-10-CM

## 2025-04-07 LAB — SL AMB POCT HEMOGLOBIN AIC: 9.8 (ref ?–6.5)

## 2025-04-07 PROCEDURE — 99495 TRANSJ CARE MGMT MOD F2F 14D: CPT | Performed by: INTERNAL MEDICINE

## 2025-04-07 PROCEDURE — 83036 HEMOGLOBIN GLYCOSYLATED A1C: CPT | Performed by: INTERNAL MEDICINE

## 2025-04-07 SDOH — ECONOMIC STABILITY - TRANSPORTATION SECURITY: TRANSPORTATION INSECURITY: Z59.82

## 2025-04-07 NOTE — LETTER
Diabetic Eye Exam Form    Date Requested: 25  Patient: Renard Antoine  Patient : 1945   Referring Provider: Armando Lozada DO      DIABETIC Eye Exam Date _______________________________      Type of Exam MUST be documented for Diabetic Eye Exams. Please CHECK ONE.     Retinal Exam       Dilated Retinal Exam       OCT       Optomap-Iris Exam      Fundus Photography       Left Eye - Please check Retinopathy or No Retinopathy        Exam did show retinopathy    Exam did not show retinopathy       Right Eye - Please check Retinopathy or No Retinopathy       Exam did show retinopathy    Exam did not show retinopathy       Comments __________________________________________________________    Practice Providing Exam ______________________________________________    Exam Performed By (print name) _______________________________________      Provider Signature ___________________________________________________      These reports are needed for  compliance.    Please fax this completed form and a copy of the Diabetic Eye Exam report to the [facility] Department as soon as possible via Fax [phone], attention [place]: Phone [phone]. Our office is located at [address] [place] [address].     We thank you for your assistance in treating our mutual patient.   [facility] - [phone] F [phone]

## 2025-04-07 NOTE — PROGRESS NOTES
Transition of Care Visit:  Name: Renard Antoine      : 1945      MRN: [id number]  Encounter Provider: Armando Lozada DO  Encounter Date: 2025   [facility]: [facility]    Assessment & Plan  Type 2 diabetes mellitus with hyperglycemia, with long-term current use of insulin ([facility])  Transition care management visit regarding recent hospitalization at the [facility] admission date 3/28/2025 patient presented to the hospital with elevated blood sugar readings had not been taking his insulin short acting regularly it developed polydipsia hospitalized started with 10 units of IV insulin and IV fluids in the ER and admitted to the general medical team.  During the hospitalization blood sugars were stabilized his symptoms improved.  He is now on Lantus 18 units at bedtime along with Humalog 5 units 3 times a day prior to the main meals he is now eating better following a diabetic diet and taking his medicines routinely his daughter is monitoring he has not been coming appointment with endocrinology today we did review the discharge summary laboratory test and medications completed during the hospitalization with the patient and his daughter.  RTO in 3 to 4 months call any problems  Lab Results   Component Value Date    HGBA1C 9.8 (A) 2025       Orders:  •  POCT hemoglobin A1c  •  Hemoglobin A1C; Future  •  Comprehensive metabolic panel; Future  •  Albumin / creatinine urine ratio; Future  •  Lipid Panel with Direct LDL reflex; Future    Transportation insecurity  Patient is currently getting physical therapy in the home and because of transportation issues it would be difficult for him to make it to the physical therapy department therefore I have ordered  to help set up the length of then for the patient.  When they complete physical therapy they may contact us for formal orders for physical therapy he is deconditioned  Orders:  •  Ambulatory  Referral to Social Work Care Management Program; Future  RTO in 3 to 4 months call me problems    Falls Plan of Care: balance, strength, and gait training instructions were provided.         History of Present Illness     Transitional Care Management Review:   Renard Antoine is a 79 y.o. male here for TCM follow up.  Uncontrolled diabetes secondary to medication noncompliance the patient's daughter is at the visit with me and explains to me that he got a new short acting insulin which was equal to what he was receiving prior but had a different name on it therefore he was not taking his insulin.  He was not eating properly and skipping meals.  His blood sugars went into the 600 range patient did have polydipsia.  He was taken to ER and subsequently admitted we did review that discharge summary laboratories and test completed during the hospitalization he did receive IV insulin.  He is now taking his medications properly and understands his medications he does have his daughter that prompts and monitors closely he will be seeing endocrinology in the near future eating healthy he was skipping meals and not using the insulin 150 fasting no thirst, no nocturia no numbness motivation good     During the TCM phone call patient stated:  TCM Call (since 3/24/2025)     Date and time call was made  4/1/2025  1:17 PM    Hospital care reviewed  Records reviewed    Patient was hospitialized at  [facility]    Date of Admission  03/28/25    Date of discharge  03/30/25    Diagnosis  Hyperglycemia    Disposition  Home    Were the patients medications reviewed and updated  No    Current Symptoms  None      TCM Call (since 3/24/2025)     Post hospital issues  None    Scheduled for follow up?  Yes    Did you obtain your prescribed medications  Yes    Do you need help managing your prescriptions or medications  No    Is transportation to your appointment needed  No    I have advised the patient to call PCP with any new or  "worsening symptoms  Valeria Gonzalez - /MA    Are you recieving home care services  Yes    Types of home care services  Home PT        HPI  Review of Systems   Constitutional:  Negative for activity change, appetite change and unexpected weight change.   HENT:  Negative for congestion and postnasal drip.    Eyes:  Negative for visual disturbance.   Respiratory:  Negative for cough and shortness of breath.    Cardiovascular:  Negative for chest pain.   Gastrointestinal:  Negative for abdominal pain, diarrhea, nausea and vomiting.   Neurological:  Negative for dizziness, light-headedness and headaches.   Hematological:  Negative for adenopathy.     Objective   /60 (BP Location: Left arm, Patient Position: Sitting, Cuff Size: Large)   Pulse 68   Temp 97.7 °F (36.5 °C) (Tympanic)   Ht 5' 6\" (1.676 m)   Wt 60.6 kg (133 lb 9.6 oz)   SpO2 96%   BMI 21.56 kg/m²     Physical Exam  Vitals and nursing note reviewed.   Constitutional:       General: He is not in acute distress.     Appearance: Normal appearance. He is well-developed and normal weight. He is not ill-appearing, toxic-appearing or diaphoretic.   HENT:      Head: Normocephalic and atraumatic.      Right Ear: External ear normal.      Left Ear: External ear normal.      Nose: Nose normal.   Eyes:      Pupils: Pupils are equal, round, and reactive to light.   Cardiovascular:      Rate and Rhythm: Normal rate and regular rhythm.      Heart sounds: Normal heart sounds. No murmur heard.  Pulmonary:      Effort: Pulmonary effort is normal.      Breath sounds: Normal breath sounds.   Abdominal:      General: There is no distension.      Palpations: Abdomen is soft.      Tenderness: There is no abdominal tenderness. There is no guarding.   Neurological:      Mental Status: He is alert.     Negative edema, distal pulses 2/2        "

## 2025-04-07 NOTE — TELEPHONE ENCOUNTER
----- Message from Lexy QUINONES sent at 4/7/2025 10:01 AM EDT -----  Regarding: Care Gap Request  04/07/25 10:01 AM    Hello, our patient above has had Diabetic Eye Exam completed/performed. Please assist in updating the patient chart by making an External outreach to [phone] facility located in [address] [place] [address] [id number], [place] [id number]. The date of service is 03/2025.    Thank you,  Lexy Caal MA  [facility]

## 2025-04-07 NOTE — ASSESSMENT & PLAN NOTE
Transition care management visit regarding recent hospitalization at the [facility] admission date 3/28/2025 patient presented to the hospital with elevated blood sugar readings had not been taking his insulin short acting regularly it developed polydipsia hospitalized started with 10 units of IV insulin and IV fluids in the ER and admitted to the general medical team.  During the hospitalization blood sugars were stabilized his symptoms improved.  He is now on Lantus 18 units at bedtime along with Humalog 5 units 3 times a day prior to the main meals he is now eating better following a diabetic diet and taking his medicines routinely his daughter is monitoring he has not been coming appointment with endocrinology today we did review the discharge summary laboratory test and medications completed during the hospitalization with the patient and his daughter.  RTO in 3 to 4 months call any problems  Lab Results   Component Value Date    HGBA1C 9.8 (A) 04/07/2025       Orders:  •  POCT hemoglobin A1c  •  Hemoglobin A1C; Future  •  Comprehensive metabolic panel; Future  •  Albumin / creatinine urine ratio; Future  •  Lipid Panel with Direct LDL reflex; Future

## 2025-04-07 NOTE — LETTER
Diabetic Eye Exam Form    Date Requested: 25  Patient: Renard Antoine  Patient : 1945   Referring Provider: Armando Lozada DO      DIABETIC Eye Exam Date _______________________________      Type of Exam MUST be documented for Diabetic Eye Exams. Please CHECK ONE.     Retinal Exam       Dilated Retinal Exam       OCT       Optomap-Iris Exam      Fundus Photography       Left Eye - Please check Retinopathy or No Retinopathy        Exam did show retinopathy    Exam did not show retinopathy       Right Eye - Please check Retinopathy or No Retinopathy       Exam did show retinopathy    Exam did not show retinopathy       Comments __________________________________________________________    Practice Providing Exam ______________________________________________    Exam Performed By (print name) _______________________________________      Provider Signature ___________________________________________________      These reports are needed for  compliance.    Please fax this completed form and a copy of the Diabetic Eye Exam report to the [facility] Department as soon as possible via Fax [phone], attention [place]: Phone [phone]. Our office is located at [address] [place] [address].     We thank you for your assistance in treating our mutual patient.   [facility] - [phone] F [phone]

## 2025-04-08 ENCOUNTER — PATIENT OUTREACH (OUTPATIENT)
Dept: CASE MANAGEMENT | Facility: OTHER | Age: 80
End: 2025-04-08

## 2025-04-08 NOTE — PROGRESS NOTES
"Outpatient Care Management note    Chart review completed    Patient had follow up with the clinical pharmacist on 4/2/25. PAP paperwork was processed for Jardiance. Plan for the patient to discontinue Humalog once Jardiance received. Clinical pharmacist ordered a CGM- Jules 3 plus as well. He will see the clinical pharmacist again on 4/16/25 to place the Jules CGM sensor and follow up on Jardiance. Renard had follow up with his PCP on 4/7/25. Labs ordered. PCP placed a OP SW CM referral for transportation as patient is currently getting home PT but will need to transition to outpatient.     Spoke with patient's daughter, Damaris. She confirmed the new orders stated above. She declined the need for RN CM follow up in DM management- testing, meds or diet. She reports they're \"good.\" Advised her to look out for SW call. She had no questions or concerns at this time.     Renard is scheduled to see Endo on 4/15/25.    Will close this referral as a one time outreach.   "

## 2025-04-09 NOTE — TELEPHONE ENCOUNTER
Upon review of the In Basket request and the patient's chart, initial outreach has been made via fax to facility. Please see Contacts section for details.     Thank you  Osvaldo Mina MA

## 2025-04-10 ENCOUNTER — PATIENT OUTREACH (OUTPATIENT)
Dept: CASE MANAGEMENT | Facility: OTHER | Age: 80
End: 2025-04-10

## 2025-04-10 NOTE — PROGRESS NOTES
CHRIS HOLGUIN received referral for patient from Armando Lozada DO for transportation insecurity. Per chart review, patient is currently receiving PT in home due to transportation concern and not being able to get to outpatient PT.    Patient has prior OP CHRIS HOLGUIN outreach in February of 2024, but contact was unable to be made.    CHRIS HOLGUIN placed initial outreach call to patient's daughter, Damaris, who is listed on patient's Medical Communication Consent form. Patient's daughter stated that she lives an hour and half away from patient and that he needs assistance with transportation to OP PT. Patient's daughter stated that she knows there is a bus that would be able to transport patient. CHRIS HOLGUIN reviewed that [employer], which is the local bus system in the area and they would be the resource to take patient to medical appointments. CHRIS HOLGUIN reviewed role of CMOC and patient's daughter was agreeable to referral for assistance with [employer] application. Patient's daughter stated she handles patient's appointments and needs so she would be the one for CMOC to contact to assist with application.    Patient's daughter reported that there were no other needs at this time. CHRIS HOLGUIN added CMOC to Supports and Services, enrolled patient in program and added self to care team.

## 2025-04-11 ENCOUNTER — PATIENT OUTREACH (OUTPATIENT)
Dept: CASE MANAGEMENT | Facility: OTHER | Age: 80
End: 2025-04-11

## 2025-04-11 NOTE — PROGRESS NOTES
CMOC received patient from the Outreach Pool to assist with [employer] Transportation. CMOC sent SW an IB to be added to care plan tasks. CMOC received incoming IB confirming the addition.     CMOC placed call to patients daughter and left a VM introducing self, role, reason for call, and detailed information for a return call.     Awaiting response    Next Outreach 4/18

## 2025-04-12 ENCOUNTER — TELEPHONE (OUTPATIENT)
Dept: OTHER | Facility: OTHER | Age: 80
End: 2025-04-12

## 2025-04-12 NOTE — TELEPHONE ENCOUNTER
Gary from [facility] called reporting that he was unable to see the patient because they did not answer their phone

## 2025-04-15 ENCOUNTER — CONSULT (OUTPATIENT)
Dept: ENDOCRINOLOGY | Facility: CLINIC | Age: 80
End: 2025-04-15
Payer: COMMERCIAL

## 2025-04-15 VITALS
WEIGHT: 132.2 LBS | OXYGEN SATURATION: 97 % | HEART RATE: 79 BPM | SYSTOLIC BLOOD PRESSURE: 130 MMHG | HEIGHT: 66 IN | DIASTOLIC BLOOD PRESSURE: 74 MMHG | BODY MASS INDEX: 21.24 KG/M2

## 2025-04-15 DIAGNOSIS — I10 ESSENTIAL HYPERTENSION: Primary | ICD-10-CM

## 2025-04-15 DIAGNOSIS — E78.2 MIXED HYPERLIPIDEMIA: ICD-10-CM

## 2025-04-15 DIAGNOSIS — E11.22 TYPE 2 DIABETES MELLITUS WITH CHRONIC KIDNEY DISEASE, WITHOUT LONG-TERM CURRENT USE OF INSULIN, UNSPECIFIED CKD STAGE (HCC): ICD-10-CM

## 2025-04-15 PROBLEM — R73.9 HYPERGLYCEMIA: Status: RESOLVED | Noted: 2025-03-28 | Resolved: 2025-04-15

## 2025-04-15 PROCEDURE — 99214 OFFICE O/P EST MOD 30 MIN: CPT | Performed by: INTERNAL MEDICINE

## 2025-04-15 PROCEDURE — G2211 COMPLEX E/M VISIT ADD ON: HCPCS | Performed by: INTERNAL MEDICINE

## 2025-04-15 NOTE — ASSESSMENT & PLAN NOTE
Most recent lipid panel in 2023 demonstrates LDL at goal  Continue atorvastatin 20 mg daily  Will get updated lipid panel with next set of labs

## 2025-04-15 NOTE — ASSESSMENT & PLAN NOTE
Lab Results   Component Value Date    HGBA1C 9.8 (A) 04/07/2025   Type 2 diabetes with hyperglycemia as evidenced by A1c of 9.8% in April 2025.  Currently takes  Home sugars have been ranging  Check blood sugars               Times daily, keep log, send sugar log if blood sugars >300 or < 70 mg/dl consistently ( more than twice a week)     Plan:  Changes made to regimen today-       Advised to call if blood sugars persistently less than 60 mg/dl or over 400 mg/dl.   Check blood glucose 4 times a day, with help of Glucometer/continuous glucose monitor sensor.  Patient  is using continuous glucose monitor sensor regularly and is being benefited from it as his treatment plan includes adjustment in insulin regimen based on blood sugars.  Discussed hypoglycemia symptoms and treatment  Discussed intensive insulin regimen does increase risk for hypoglycemia. Episodes of hypoglycemia can lead to permanent disability and death.  Discussed risks/complications associated with uncontrolled diabetes, including neuropathy, nephropathy, retinopathy, CAD  Counseled patient to adhere to diabetic diet, and recommended staying active/exercising routinely.Keep carbohydrates consistent to limit blood glucose fluctuations.  Recommended routine follow-up with podiatry and ophthalmology.   Ordered blood work to complete prior to next visit.       Orders:    Ambulatory Referral to Endocrinology

## 2025-04-15 NOTE — ASSESSMENT & PLAN NOTE
Blood pressure slightly above target range  At this time continue amlodipine and losartan  Continue monitoring blood pressure

## 2025-04-15 NOTE — ASSESSMENT & PLAN NOTE
Lab Results   Component Value Date    HGBA1C 9.8 (A) 04/07/2025   Type 2 diabetes mellitus with hyperglycemia as evidenced by A1c of 9.8%.  Although blood sugars after breakfast have been ranging 100-150, his breakfast is not very high in carbohydrates, and I suspect he has significant postprandial hyperglycemia later in the day contributing to his current A1c.  Thus I believe he would be an excellent candidate for a continuous glucose monitor.  He will need to see diabetes education for CGM training, additionally will greatly benefit from MNT.    Plan:  Continue Lantus 18 units nightly, Humalog 5 units 3 times daily AC, metformin 500 mg twice daily and Jardiance 10 mg daily  Will get repeat BMP in 4 weeks-if no significant changes, will increase Jardiance to 25 mg daily.  Continue monitoring blood sugars at least twice daily, maintain blood sugar log until he receives CGM and CGM training.  Advised to call if blood sugars persistently less than 60 mg/dl or over 350 mg/dl.   Discussed hypoglycemia symptoms and treatment  Discussed risks/complications associated with uncontrolled diabetes, including neuropathy, nephropathy, retinopathy, CAD  Counseled patient to adhere to diabetic diet, and recommended staying active/exercising routinely.Keep carbohydrates consistent to limit blood glucose fluctuations.  Recommended routine follow-up with podiatry and ophthalmology.  Ambulatory referral provided for podiatry.  Ordered blood work to complete in 4 weeks, and prior to next visit.   Return for follow-up in 3 months      Orders:    Ambulatory Referral to Endocrinology    Ambulatory Referral to Diabetic Education; Future    Ambulatory Referral to Podiatry; Future    Basic metabolic panel; Future    Hemoglobin A1C; Future    Lipid Panel with Direct LDL reflex; Future    Albumin / creatinine urine ratio; Future    Basic metabolic panel; Future
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
## 2025-04-15 NOTE — PATIENT INSTRUCTIONS
A continuous glucose monitor will be ordered for you - we have ordered a referral to diabetes education where they will teach you how to use it.   In the meantime continue checking your sugars twice daily at different times and maintain a blood sugar log.   Continue taking your insulin, metformin and Jardiance as prescribed.   Call if blood sugars are persistently below 60 or above 350.  Get bloodwork in approximately 4 weeks and again in 3 months  A referral to podiatry has been ordered for you - schedule it at your earliest convenience.   Return for follow up in  3 months.
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
## 2025-04-16 ENCOUNTER — OFFICE VISIT (OUTPATIENT)
Dept: INTERNAL MEDICINE CLINIC | Facility: CLINIC | Age: 80
End: 2025-04-16

## 2025-04-16 DIAGNOSIS — Z79.4 TYPE 2 DIABETES MELLITUS WITH HYPERGLYCEMIA, WITH LONG-TERM CURRENT USE OF INSULIN (HCC): Primary | ICD-10-CM

## 2025-04-16 DIAGNOSIS — E11.65 TYPE 2 DIABETES MELLITUS WITH HYPERGLYCEMIA, WITH LONG-TERM CURRENT USE OF INSULIN (HCC): Primary | ICD-10-CM

## 2025-04-16 LAB
DME PARACHUTE DELIVERY DATE ACTUAL: NORMAL
DME PARACHUTE DELIVERY DATE REQUESTED: NORMAL
DME PARACHUTE ITEM DESCRIPTION: NORMAL
DME PARACHUTE ITEM DESCRIPTION: NORMAL
DME PARACHUTE ORDER STATUS: NORMAL
DME PARACHUTE SUPPLIER NAME: NORMAL
DME PARACHUTE SUPPLIER PHONE: NORMAL

## 2025-04-16 PROCEDURE — PBNCHG PB NO CHARGE PLACEHOLDER: Performed by: PHARMACIST

## 2025-04-16 NOTE — PROGRESS NOTES
[facility] Clinical Pharmacy Services  Radha Coats, Pharmacist      Assessment/ Plan     Assessment & Plan  Type 2 diabetes mellitus with hyperglycemia, with long-term current use of insulin ([facility])    Lab Results   Component Value Date    HGBA1C 9.8 (A) 04/07/2025   Patient did not yet receive CGM from DME  Went over setup with a sample Jules 3  Patient will schedule another appt with device when they receive to setup, download review  Labs due now for kidney function to potentially increase Jardiance // reduce humalog  PAP for BI - unclear status, likely approved, patient states he received medication in mail           Follow-up: to apply/setup Jules 3 plus in approx. 2 weeks    Subjective     Medication Adherence/ Tolerability/ Cost:  Patient denies side effects, no issues with cost, some missed doses in last two week  amLODIPine  aspirin  atorvastatin  Comfort EZ Pen Needles Misc  donepezil  escitalopram  Insulin Glargine Solostar Sopn  insulin lispro  losartan  Melatin Tabs  metFORMIN  OneTouch Delica Lancets 33G Misc  OneTouch Verio Reflect Kit  OneTouch Verio Strp  trospium chloride      2. Lifestyle:   Last visit with dietician: n/a  Previously attended Living Well with Diabetes Class: no  Diet Recall:   Breakfast:   Lunch:   Dinner:   Snacks:   Beverages:   Physical Activity:     3. Home monitoring devices  Glucometer: , Brand: unknown - will order new one, patient states it is old  Continuous Glucose Monitor: No, Brand: sending jules 3 plus today  Blood Pressure monitor: No, Brand: n/a    Hypoglycemia: The patient had 0 episodes/symptoms of hypoglycemia.   Hyperglycemia: The patient had some symptoms of hyperglycemia. - polyuria    Objective       Blood Sugar Readings  CGM Report scanned into chart - sending order today, likely will use reader versus phone  The patient is currently checking blood glucose 1-2 times per day. Patient does not report with SMBG logs.    Date AM Post-Breakfast Lunch  Post-Lunch Dinner Post-Dinner Comments                                                                                                                           Avg                ASCVD Risk:  The ASCVD Risk score (Marielena GUNTER, et al., 2019) failed to calculate for the following reasons:    The 2019 ASCVD risk score is only valid for ages 40 to 79     Vitals:  There were no vitals filed for this visit.    Eye Exam:    Lab Results   Component Value Date    LEFTDIABRET None 11/02/2020    RIGHTDIABRET None 11/02/2020       Labs:  Lab Results   Component Value Date    SODIUM 138 03/29/2025    K 4.0 03/29/2025    EGFR 80 03/29/2025    CREATININE 0.89 03/29/2025    GLUF 140 (H) 02/14/2024    [id number] 519 02/12/2025    MICROALBCRE 1,638.6 (H) 08/17/2023       Lab Results   Component Value Date    HGBA1C 9.8 (A) 04/07/2025    HGBA1C 9.9 (H) 02/09/2025    HGBA1C 7.0 (A) 05/14/2024         Pharmacist Tracking Tool     Pharmacist Tracking Tool  Reason For Outreach: Embedded Pharmacist  Demographics:  Intervention Method: In Person  Type of Intervention: Follow-Up  Topics Addressed: Diabetes  Pharmacologic Interventions: Prevent or Manage STACIE and Med Rec  Non-Pharmacologic Interventions: Adherence addressed, Care coordination, Chart update, Cost, Disease state education, Home Monitoring, Labs, Medication Monitoring, Care Gap, Medication/Device education, and Personal CGM  Time:  Direct Patient Care:  25  mins  Care Coordination:  30  mins  Recommendation Recipient: Patient/Caregiver  Outcome: Accepted

## 2025-04-16 NOTE — ASSESSMENT & PLAN NOTE
Lab Results   Component Value Date    HGBA1C 9.8 (A) 04/07/2025   Patient did not yet receive CGM from DME  Went over setup with a sample Jules 3  Patient will schedule another appt with device when they receive to setup, download review  Labs due now for kidney function to potentially increase Jardiance // reduce humalog  PAP for BI - unclear status, likely approved, patient states he received medication in mail

## 2025-04-16 NOTE — TELEPHONE ENCOUNTER
As a follow-up, a second attempt has been made for outreach via fax to facility. Please see Contacts section for details.    Thank you  Osvaldo Mina MA

## 2025-04-18 ENCOUNTER — PATIENT OUTREACH (OUTPATIENT)
Dept: CASE MANAGEMENT | Facility: OTHER | Age: 80
End: 2025-04-18

## 2025-04-18 NOTE — LETTER
04/18/25    Dear Renard Antoine,    I am a Community Health Worker with [facility]  [facility]  [address]  [place] [address] [phone]     I have made several attempts to contact Damaris by telephone. If you are still in need of assistance with Janis please contact me directly at [phone].     Sincerely,     Yesica Esposito

## 2025-04-18 NOTE — PROGRESS NOTES
Second attempt to contact patients daughter. VM left.     UTR letter sent via [employer].     If no contact from patient by 4/22 CM will close.

## 2025-04-21 ENCOUNTER — TELEPHONE (OUTPATIENT)
Age: 80
End: 2025-04-21

## 2025-04-22 ENCOUNTER — PATIENT OUTREACH (OUTPATIENT)
Dept: CASE MANAGEMENT | Facility: OTHER | Age: 80
End: 2025-04-22

## 2025-04-23 ENCOUNTER — PATIENT OUTREACH (OUTPATIENT)
Dept: CASE MANAGEMENT | Facility: OTHER | Age: 80
End: 2025-04-23

## 2025-04-23 NOTE — TELEPHONE ENCOUNTER
As a final attempt, a third outreach has been made via telephone call to facility. Please see Contacts section for details. This encounter will be closed and completed by end of day. Should we receive the requested information because of previous outreach attempts, the requested patient's chart will be updated appropriately.     Thank you  Osvaldo Mina MA

## 2025-04-23 NOTE — PROGRESS NOTES
CHRIS HOLGUIN reviewed patient's chart. [facility]OC outreached patient's daughter on 4/11 and 4/18 and voicemail's were left. Unable to Reach letter was sent to patient's MyChart and CMOC closed. CHRIS HOLGUIN closed program and removed self from care team.

## 2025-05-15 ENCOUNTER — TELEPHONE (OUTPATIENT)
Age: 80
End: 2025-05-15

## 2025-05-15 NOTE — TELEPHONE ENCOUNTER
Aidee Roa would like to know medial reason why patient is not taking anticoagulant medication with his diagnosis of  Afib Please call

## 2025-05-15 NOTE — TELEPHONE ENCOUNTER
Brandon called back to follow up.  Relayed provider's message.  Krishna expressed understanding.  No further action needed at this time.

## 2025-07-16 ENCOUNTER — DOCUMENTATION (OUTPATIENT)
Dept: ADMINISTRATIVE | Facility: OTHER | Age: 80
End: 2025-07-16

## 2025-07-16 NOTE — PROGRESS NOTES
07/16/25 2:11 PM    Annual Wellness Visit outreach is not required, patient has an upcoming appointment with the PCP office.    Thank you.  Thong Bo MA  [facility]

## 2025-07-23 RX ORDER — INSULIN LISPRO 100 [IU]/ML
5 INJECTION, SOLUTION INTRAVENOUS; SUBCUTANEOUS
COMMUNITY
Start: 2025-06-10

## 2025-07-25 ENCOUNTER — DOCUMENTATION (OUTPATIENT)
Dept: INTERNAL MEDICINE CLINIC | Facility: CLINIC | Age: 80
End: 2025-07-25

## 2025-07-25 ENCOUNTER — TELEPHONE (OUTPATIENT)
Dept: ADMINISTRATIVE | Facility: OTHER | Age: 80
End: 2025-07-25

## 2025-07-25 ENCOUNTER — OFFICE VISIT (OUTPATIENT)
Dept: INTERNAL MEDICINE CLINIC | Facility: CLINIC | Age: 80
End: 2025-07-25
Payer: COMMERCIAL

## 2025-07-25 VITALS
RESPIRATION RATE: 16 BRPM | HEIGHT: 66 IN | BODY MASS INDEX: 21.6 KG/M2 | OXYGEN SATURATION: 94 % | DIASTOLIC BLOOD PRESSURE: 70 MMHG | WEIGHT: 134.4 LBS | HEART RATE: 66 BPM | SYSTOLIC BLOOD PRESSURE: 112 MMHG

## 2025-07-25 DIAGNOSIS — R26.89 IMBALANCE: ICD-10-CM

## 2025-07-25 DIAGNOSIS — I10 ESSENTIAL HYPERTENSION: ICD-10-CM

## 2025-07-25 DIAGNOSIS — E11.65 TYPE 2 DIABETES MELLITUS WITH HYPERGLYCEMIA, WITH LONG-TERM CURRENT USE OF INSULIN (HCC): ICD-10-CM

## 2025-07-25 DIAGNOSIS — M54.2 NECK PAIN: Primary | ICD-10-CM

## 2025-07-25 DIAGNOSIS — Z79.4 TYPE 2 DIABETES MELLITUS WITH HYPERGLYCEMIA, WITH LONG-TERM CURRENT USE OF INSULIN (HCC): ICD-10-CM

## 2025-07-25 DIAGNOSIS — E78.2 MIXED HYPERLIPIDEMIA: ICD-10-CM

## 2025-07-25 DIAGNOSIS — N18.1 CHRONIC KIDNEY DISEASE (CKD) STAGE G1/A2, GLOMERULAR FILTRATION RATE (GFR) EQUAL TO OR GREATER THAN 90 ML/MIN/1.73 SQUARE METER AND ALBUMINURIA CREATININE RATIO BETWEEN 30-299 MG/G: ICD-10-CM

## 2025-07-25 LAB — SL AMB POCT HEMOGLOBIN AIC: 7.6 (ref ?–6.5)

## 2025-07-25 PROCEDURE — G2211 COMPLEX E/M VISIT ADD ON: HCPCS | Performed by: INTERNAL MEDICINE

## 2025-07-25 PROCEDURE — 99214 OFFICE O/P EST MOD 30 MIN: CPT | Performed by: INTERNAL MEDICINE

## 2025-07-25 PROCEDURE — 83036 HEMOGLOBIN GLYCOSYLATED A1C: CPT | Performed by: INTERNAL MEDICINE
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
## 2025-07-25 NOTE — ASSESSMENT & PLAN NOTE
Suboptimal control target A1c under 7 I would like the patient to optimize his diet reducing carbohydrates and sweets he is a great he has not been coming appointment with endocrinology   Lab Results   Component Value Date    HGBA1C 7.6 (A) 07/25/2025       Orders:  •  POCT hemoglobin A1c  •  IRIS Diabetic eye exam  •  Hemoglobin A1C; Future  •  Comprehensive metabolic panel; Future  •  Lipid Panel with Direct LDL reflex; Future  •  Albumin / creatinine urine ratio; Future
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
## 2025-08-08 ENCOUNTER — TELEPHONE (OUTPATIENT)
Dept: DIABETES SERVICES | Facility: CLINIC | Age: 80
End: 2025-08-08

## 2025-08-15 ENCOUNTER — TELEPHONE (OUTPATIENT)
Age: 80
End: 2025-08-15

## 2025-08-15 ENCOUNTER — TELEMEDICINE (OUTPATIENT)
Dept: ENDOCRINOLOGY | Facility: CLINIC | Age: 80
End: 2025-08-15
Payer: COMMERCIAL